# Patient Record
Sex: FEMALE | Race: BLACK OR AFRICAN AMERICAN | NOT HISPANIC OR LATINO | Employment: OTHER
[De-identification: names, ages, dates, MRNs, and addresses within clinical notes are randomized per-mention and may not be internally consistent; named-entity substitution may affect disease eponyms.]

---

## 2018-01-07 ENCOUNTER — HEALTH MAINTENANCE LETTER (OUTPATIENT)
Age: 53
End: 2018-01-07

## 2018-10-19 ENCOUNTER — TRANSFERRED RECORDS (OUTPATIENT)
Dept: HEALTH INFORMATION MANAGEMENT | Facility: HOSPITAL | Age: 53
End: 2018-10-19

## 2019-04-02 ENCOUNTER — TRANSFERRED RECORDS (OUTPATIENT)
Dept: HEALTH INFORMATION MANAGEMENT | Facility: HOSPITAL | Age: 54
End: 2019-04-02

## 2019-08-01 ENCOUNTER — TRANSFERRED RECORDS (OUTPATIENT)
Dept: HEALTH INFORMATION MANAGEMENT | Facility: HOSPITAL | Age: 54
End: 2019-08-01

## 2019-08-01 LAB
CHOLESTEROL (EXTERNAL): 170 MG/DL (ref 100–199)
HDLC SERPL-MCNC: 44 MG/DL
LDL CHOLESTEROL CALCULATED (EXTERNAL): 106 MG/DL
NON HDL CHOLESTEROL (EXTERNAL): 126 MG/DL
TRIGLYCERIDES (EXTERNAL): 98 MG/DL

## 2020-08-27 NOTE — PROGRESS NOTES
Subjective     Rimma Sarmiento is a 55 year old female who presents to clinic today for the following health issues:    HPI       New Patient/Transfer of Care: Followed with Aida OLIVARES with last visit 1/22/2020    Diabetes Follow-up    How often are you checking your blood sugar? Three times daily  Blood sugar testing frequency justification:  Uncontrolled diabetes  What time of day are you checking your blood sugars (select all that apply)?  Before meals  Have you had any blood sugars above 200?  No  Have you had any blood sugars below 70?  No    What symptoms do you notice when your blood sugar is low?  Lethargy and irratated    What concerns do you have today about your diabetes? None     Do you have any of these symptoms? (Select all that apply)  No numbness or tingling in feet.  No redness, sores or blisters on feet.  No complaints of excessive thirst.  No reports of blurry vision.  No significant changes to weight.    Have you had a diabetic eye exam in the last 12 months? Yes-  Location: [facility]    - last A1c (1/22/2020, [place]): 7.3  - BG: morning 121, lunch 161, dinner 99  - lantus 32u   - Novolog 6/4/3  - on ASA, statin (atorvastatin 80mg), ARB (losartan 100mg)  - attempting to lose weight     Hyperlipidemia Follow-Up      Are you regularly taking any medication or supplement to lower your cholesterol?   Yes- atorvastatin    Are you having muscle aches or other side effects that you think could be caused by your cholesterol lowering medication?  No  - LDL (8/1/2019, [place]): 106 w/ HDL 44  - atorvastatin 80mg    Hypertension / NSVT Follow-up      Do you check your blood pressure regularly outside of the clinic? No     Are you following a low salt diet? Yes    Are your blood pressures ever more than 140 on the top number (systolic) OR more   than 90 on the bottom number (diastolic), for example 140/90? No  - metoprolol succinate 12.5mg  - losartan 100mg  - did not take her medications  this morning, generally takes her medication regularly     - recommend getting an ICD, but Rimma refused therefore she is being treated with metoprolol    BP Readings from Last 2 Encounters:   20 138/84     No results found for: A1C, LDL    Depression and Anxiety Follow-Up    How are you doing with your depression since your last visit? Worsened since moving here -     How are you doing with your anxiety since your last visit?  Worsened since moving    Are you having other symptoms that might be associated with depression or anxiety? No    Have you had a significant life event? No     Do you have any concerns with your use of alcohol or other drugs? No    - lexapro 10mg   - hydroxyzine   - Shannan is making her depressed   - moved to [place] to be close to her son  - has tried counseling in the past w/o good results    Social History     Tobacco Use     Smoking status: Former Smoker     Packs/day: 0.25     Types: Cigarettes     Last attempt to quit: 2019     Years since quittin.0     Smokeless tobacco: Never Used   Substance Use Topics     Alcohol use: Not Currently     Drug use: None     PHQ 2020   PHQ-9 Total Score 18   Q9: Thoughts of better off dead/self-harm past 2 weeks Not at all     PAMELA-7 SCORE 2020   Total Score 21     Last PHQ-9 2020   1.  Little interest or pleasure in doing things 3   2.  Feeling down, depressed, or hopeless 3   3.  Trouble falling or staying asleep, or sleeping too much 3   4.  Feeling tired or having little energy 3   5.  Poor appetite or overeating 3   6.  Feeling bad about yourself 1   7.  Trouble concentrating 2   8.  Moving slowly or restless 0   Q9: Thoughts of better off dead/self-harm past 2 weeks 0   PHQ-9 Total Score 18   Difficulty at work, home, or with people Somewhat difficult     PAMELA-7  2020   1. Feeling nervous, anxious, or on edge 3   2. Not being able to stop or control worrying 3   3. Worrying too much about different  "things 3   4. Trouble relaxing 3   5. Being so restless that it is hard to sit still 3   6. Becoming easily annoyed or irritable 3   7. Feeling afraid, as if something awful might happen 3   PAMELA-7 Total Score 21   If you checked any problems, how difficult have they made it for you to do your work, take care of things at home, or get along with other people? Extremely difficult        # GERD  - changed to zantac which does not work as well and would like her omeprazole back   - omeprazole interacts with her plavix    # h/o CVA (9/5/2019 stroke like symptoms and true stroke ~ 2014 ) b/l embolism of posterior cerebral arteries with residual hemianopsia   - plavix / ASA    # concern for STDs: exposure July   - vaginal discharge, no odor, itching, or sores     Review of Systems   Constitutional: Negative for chills and fever.   HENT: Negative for congestion, ear pain, rhinorrhea and sore throat.    Eyes: Negative for pain.   Respiratory: Negative for cough and shortness of breath.    Cardiovascular: Negative for chest pain and palpitations.   Gastrointestinal: Negative for abdominal pain, constipation, diarrhea, hematochezia, nausea and vomiting.   Endocrine: Negative for cold intolerance, heat intolerance and polyuria.   Genitourinary: Positive for flank pain, frequency, urgency and vaginal discharge. Negative for dysuria, hematuria and pelvic pain.        No odor   Musculoskeletal: Negative for arthralgias, joint swelling and myalgias.   Skin: Positive for rash (acne).        alopecia    Neurological: Negative for dizziness, light-headedness and headaches.   Hematological: Does not bruise/bleed easily.   Psychiatric/Behavioral: Negative for mood changes. The patient is not nervous/anxious.           Objective    /84 (BP Location: Left arm, Patient Position: Chair, Cuff Size: Adult Large)   Pulse 66   Temp 98.6  F (37  C) (Tympanic)   Resp 18   Ht 1.549 m (5' 1\")   Wt 105.5 kg (232 lb 9.6 oz)   SpO2 98%   " BMI 43.95 kg/m    Body mass index is 43.95 kg/m .  Physical Exam  Constitutional:       General: She is not in acute distress.     Appearance: She is well-developed. She is obese.   HENT:      Head: Normocephalic and atraumatic.      Right Ear: Hearing and tympanic membrane normal.      Left Ear: Hearing and tympanic membrane normal.      Mouth/Throat:      Mouth: Mucous membranes are moist.      Pharynx: No oropharyngeal exudate.   Eyes:      Extraocular Movements: Extraocular movements intact.      Pupils: Pupils are equal, round, and reactive to light.   Neck:      Musculoskeletal: Normal range of motion and neck supple.      Thyroid: No thyromegaly.   Cardiovascular:      Rate and Rhythm: Normal rate and regular rhythm.      Pulses: Normal pulses.      Heart sounds: Normal heart sounds. No murmur.   Pulmonary:      Effort: Pulmonary effort is normal. No respiratory distress.      Breath sounds: Normal breath sounds. No wheezing or rales.   Abdominal:      General: Bowel sounds are normal. There is no distension.      Palpations: Abdomen is soft.      Tenderness: There is abdominal tenderness in the epigastric area. There is no right CVA tenderness, left CVA tenderness or guarding.   Musculoskeletal: Normal range of motion.   Lymphadenopathy:      Cervical: No cervical adenopathy.   Skin:     General: Skin is dry.   Neurological:      Mental Status: She is alert.      Deep Tendon Reflexes: Reflexes normal.   Psychiatric:         Mood and Affect: Mood normal.        Results for orders placed or performed in visit on 09/09/20 (from the past 24 hour(s))   Hemoglobin A1c   Result Value Ref Range    Hemoglobin A1C 6.3 (H) 0 - 5.6 %   Lipid Profile   Result Value Ref Range    Cholesterol 144 <200 mg/dL    Triglycerides 123 <150 mg/dL    HDL Cholesterol 46 (L) >49 mg/dL    LDL Cholesterol Calculated 73 <100 mg/dL    Non HDL Cholesterol 98 <130 mg/dL   CBC with platelets   Result Value Ref Range    WBC 10.1 4.0 - 11.0  10e9/L    RBC Count 5.57 (H) 3.8 - 5.2 10e12/L    Hemoglobin 14.1 11.7 - 15.7 g/dL    Hematocrit 44.2 35.0 - 47.0 %    MCV 79 78 - 100 fl    MCH 25.3 (L) 26.5 - 33.0 pg    MCHC 31.9 31.5 - 36.5 g/dL    RDW 13.0 10.0 - 15.0 %    Platelet Count 386 150 - 450 10e9/L   Comprehensive metabolic panel (BMP + Alb, Alk Phos, ALT, AST, Total. Bili, TP)   Result Value Ref Range    Sodium 139 133 - 144 mmol/L    Potassium 4.5 3.4 - 5.3 mmol/L    Chloride 111 (H) 94 - 109 mmol/L    Carbon Dioxide 24 20 - 32 mmol/L    Anion Gap 4 3 - 14 mmol/L    Glucose 130 (H) 70 - 99 mg/dL    Urea Nitrogen 11 7 - 30 mg/dL    Creatinine 0.87 0.52 - 1.04 mg/dL    GFR Estimate 74 >60 mL/min/[1.73_m2]    GFR Estimate If Black 86 >60 mL/min/[1.73_m2]    Calcium 9.0 8.5 - 10.1 mg/dL    Bilirubin Total 0.3 0.2 - 1.3 mg/dL    Albumin 3.3 (L) 3.4 - 5.0 g/dL    Protein Total 7.5 6.8 - 8.8 g/dL    Alkaline Phosphatase 96 40 - 150 U/L    ALT 32 0 - 50 U/L    AST 18 0 - 45 U/L   Estimated Average Glucose   Result Value Ref Range    Estimated Average Glucose 134 mg/dL   UA reflex to Microscopic and Culture - [place]    Specimen: Midstream Urine   Result Value Ref Range    Color Urine Yellow     Appearance Urine Clear     Glucose Urine Negative NEG^Negative mg/dL    Bilirubin Urine Negative NEG^Negative    Ketones Urine Negative NEG^Negative mg/dL    Specific Gravity Urine 1.031 1.003 - 1.035    Blood Urine Trace (A) NEG^Negative    pH Urine 6.0 4.7 - 8.0 pH    Protein Albumin Urine 300 (A) NEG^Negative mg/dL    Urobilinogen mg/dL Normal 0.0 - 2.0 mg/dL    Nitrite Urine Negative NEG^Negative    Leukocyte Esterase Urine Negative NEG^Negative    Source Midstream Urine     RBC Urine 2 0 - 2 /HPF    WBC Urine 13 (H) 0 - 5 /HPF    Bacteria Urine None (A) NEG^Negative /HPF    Squamous Epithelial /HPF Urine 4 (H) 0 - 1 /HPF    Mucous Urine Present (A) NEG^Negative /LPF           Assessment & Plan     Type 2 diabetes mellitus without complication, with long-term  current use of insulin (H)  A1c 6.3, previous A1c was 7.3  - Hemoglobin A1c  - Lipid Profile  - c/w lantus 32u   - c/w Novolog 6/4/3  - on ASA, statin (atorvastatin 80mg), ARB (losartan 100mg)    Essential hypertension / Ventricular tachycardia, non-sustained (H) / HLD  BP just at goal, but Rimma did not take her BP medications this morning.   LDL (today): 73  - CBC with platelets  - Comprehensive metabolic panel (BMP + Alb, Alk Phos, ALT, AST, Total. Bili, TP)  - c/w metoprolol succinate 12.5mg for NSVT  - c/w losartan 100mg  - c/w atorvastatin 80mg   - CARDIOLOGY EVAL ADULT REFERRAL    Anxiety / Recurrent major depressive episodes (H)  Mood is worsening d/t moving to [place]. She feels the town is too small.   - hydrOXYzine (ATARAX) 25 MG tablet; Take 1 tablet (25 mg) by mouth nightly as needed for anxiety  - MENTAL HEALTH REFERRAL  - Adult; Outpatient Treatment; Behavioral Health Home; Range: [facility] [phone]; We will contact you to schedule the appointment or please call with any questions  - Estimated Average Glucose  - c/w lexapro 10mg     Alopecia  - fluocinonide (LIDEX) 0.05 % external solution; Apply topically as needed  - DERMATOLOGY ADULT REFERRAL; Future    Cerebrovascular accident (CVA) due to bilateral embolism of posterior cerebral arteries (H)  - c/w ASA 81mg / plavix     Gastroesophageal reflux disease, esophagitis presence not specified  Omeprazole was stopped d/t interaction with plavix. Zantac is not effective. Protonix does not interact with plavix  - pantoprazole (PROTONIX) 40 MG EC tablet; Take 1 tablet (40 mg) by mouth daily    Possible exposure to STD  - GC/Chlamydia by PCR - [facility]  - HIV Antigen Antibody Combo  - Treponema Abs w Reflex to RPR and Titer  - Wet prep  - UA reflex to Microscopic and Culture - [place]    Abnormal urine findings  - Urine Culture Aerobic Bacterial. No bacteria, LE, nitrates. Trace blood, but RBC of 2. WBC of 13   - UCx pending    Special screening  "for malignant neoplasms, colon  - GENERAL SURG ADULT REFERRAL    # Wellness:  - Pap: NA d/t s/p JOSE w/ BSO for heavy periods  - Mammogram (6/3/2015, [facility]): birads 1, benign   - STDs: lab orders placed   - Contraception: NA d/t JOSE  - Immunizations: hepatitis B (3 dose), shingrex, influenza  - Lipids: LDL (8/1/2019, [facility]): 106 w/ HDL 44  - Dexa scan: NA d/t age  - Colon cancer screening (6/29/2016): adenomatous polyps and hyperplastic polyps. Recommend repeat in 2 yrs d/t poor prep. Rimma is agreeable to general sx referral   - Lung cancer screening: deferred to next visit  - AAA screening: NA d/t age       BMI:   Estimated body mass index is 43.95 kg/m  as calculated from the following:    Height as of this encounter: 1.549 m (5' 1\").    Weight as of this encounter: 105.5 kg (232 lb 9.6 oz).   Weight management plan: Discussed healthy diet and exercise guidelines    See Patient Instructions    Return in about 3 months (around 12/9/2020) for Diabetic Visit, Lab Work.    Melida Turner MD  [facility] - [place]    "

## 2020-09-08 PROBLEM — E66.01 MORBID OBESITY WITH BMI OF 40.0-44.9, ADULT (H): Status: ACTIVE | Noted: 2019-09-18

## 2020-09-08 PROBLEM — M75.41 IMPINGEMENT SYNDROME OF RIGHT SHOULDER: Status: ACTIVE | Noted: 2019-08-01

## 2020-09-08 PROBLEM — I63.433: Status: ACTIVE | Noted: 2019-09-05

## 2020-09-08 PROBLEM — M75.121 NONTRAUMATIC COMPLETE TEAR OF RIGHT ROTATOR CUFF: Status: ACTIVE | Noted: 2020-09-08

## 2020-09-08 PROBLEM — I47.29 VENTRICULAR TACHYCARDIA, NON-SUSTAINED (H): Status: ACTIVE | Noted: 2019-09-18

## 2020-09-08 PROBLEM — F41.9 ANXIETY: Status: ACTIVE | Noted: 2018-04-26

## 2020-09-08 RX ORDER — HYDROCHLOROTHIAZIDE 25 MG/1
25 TABLET ORAL
COMMUNITY
End: 2021-01-07

## 2020-09-08 RX ORDER — LOSARTAN POTASSIUM 100 MG/1
100 TABLET ORAL
COMMUNITY
Start: 2020-08-12 | End: 2020-10-13

## 2020-09-08 RX ORDER — INSULIN ASPART 100 [IU]/ML
INJECTION, SOLUTION INTRAVENOUS; SUBCUTANEOUS
COMMUNITY
Start: 2020-06-24 | End: 2024-02-20

## 2020-09-08 RX ORDER — IBUPROFEN 200 MG
200 TABLET ORAL
COMMUNITY
Start: 2019-10-17 | End: 2024-06-06

## 2020-09-08 RX ORDER — INSULIN GLARGINE 100 [IU]/ML
INJECTION, SOLUTION SUBCUTANEOUS
COMMUNITY
End: 2021-05-12

## 2020-09-08 RX ORDER — FLUOCINONIDE TOPICAL SOLUTION USP, 0.05% 0.5 MG/ML
SOLUTION TOPICAL
COMMUNITY
Start: 2020-04-13 | End: 2020-09-09

## 2020-09-08 RX ORDER — HYDROXYZINE HYDROCHLORIDE 25 MG/1
25 TABLET, FILM COATED ORAL
COMMUNITY
Start: 2020-08-12 | End: 2020-09-09

## 2020-09-08 RX ORDER — METOPROLOL SUCCINATE 25 MG/1
12.5 TABLET, EXTENDED RELEASE ORAL
COMMUNITY
Start: 2020-04-13 | End: 2020-10-12 | Stop reason: DRUGHIGH

## 2020-09-08 RX ORDER — CLOPIDOGREL BISULFATE 75 MG/1
75 TABLET ORAL
COMMUNITY
Start: 2019-09-06 | End: 2024-02-20

## 2020-09-08 RX ORDER — ESCITALOPRAM OXALATE 10 MG/1
10 TABLET ORAL
COMMUNITY
End: 2021-01-07

## 2020-09-08 RX ORDER — METFORMIN HYDROCHLORIDE EXTENDED-RELEASE TABLETS 500 MG/1
TABLET, FILM COATED, EXTENDED RELEASE ORAL
COMMUNITY
Start: 2019-09-05 | End: 2020-10-12

## 2020-09-08 RX ORDER — INSULIN PUMP SYRINGE, 3 ML
EACH MISCELLANEOUS
COMMUNITY
Start: 2020-01-22 | End: 2024-02-20

## 2020-09-08 RX ORDER — ATORVASTATIN CALCIUM 80 MG/1
80 TABLET, FILM COATED ORAL
COMMUNITY
Start: 2019-09-05 | End: 2021-01-07

## 2020-09-08 RX ORDER — ASPIRIN 81 MG/1
81 TABLET ORAL
COMMUNITY
Start: 2019-10-15 | End: 2021-01-07

## 2020-09-08 RX ORDER — CLINDAMYCIN PHOSPHATE 10 MG/G
GEL TOPICAL
COMMUNITY
End: 2024-02-20

## 2020-09-09 ENCOUNTER — OFFICE VISIT (OUTPATIENT)
Dept: FAMILY MEDICINE | Facility: OTHER | Age: 55
End: 2020-09-09
Attending: FAMILY MEDICINE
Payer: COMMERCIAL

## 2020-09-09 VITALS
RESPIRATION RATE: 18 BRPM | DIASTOLIC BLOOD PRESSURE: 84 MMHG | TEMPERATURE: 98.6 F | OXYGEN SATURATION: 98 % | WEIGHT: 232.6 LBS | HEART RATE: 66 BPM | HEIGHT: 61 IN | BODY MASS INDEX: 43.92 KG/M2 | SYSTOLIC BLOOD PRESSURE: 138 MMHG

## 2020-09-09 DIAGNOSIS — R82.90 ABNORMAL URINE FINDINGS: ICD-10-CM

## 2020-09-09 DIAGNOSIS — I63.433 CEREBROVASCULAR ACCIDENT (CVA) DUE TO BILATERAL EMBOLISM OF POSTERIOR CEREBRAL ARTERIES (H): ICD-10-CM

## 2020-09-09 DIAGNOSIS — E11.9 TYPE 2 DIABETES MELLITUS WITHOUT COMPLICATION, WITH LONG-TERM CURRENT USE OF INSULIN (H): Primary | ICD-10-CM

## 2020-09-09 DIAGNOSIS — I10 ESSENTIAL HYPERTENSION: ICD-10-CM

## 2020-09-09 DIAGNOSIS — K21.9 GASTROESOPHAGEAL REFLUX DISEASE, ESOPHAGITIS PRESENCE NOT SPECIFIED: ICD-10-CM

## 2020-09-09 DIAGNOSIS — Z79.4 TYPE 2 DIABETES MELLITUS WITHOUT COMPLICATION, WITH LONG-TERM CURRENT USE OF INSULIN (H): Primary | ICD-10-CM

## 2020-09-09 DIAGNOSIS — Z20.2 POSSIBLE EXPOSURE TO STD: ICD-10-CM

## 2020-09-09 DIAGNOSIS — F41.9 ANXIETY: ICD-10-CM

## 2020-09-09 DIAGNOSIS — L65.9 ALOPECIA: ICD-10-CM

## 2020-09-09 DIAGNOSIS — I47.29 VENTRICULAR TACHYCARDIA, NON-SUSTAINED (H): ICD-10-CM

## 2020-09-09 DIAGNOSIS — F33.9 RECURRENT MAJOR DEPRESSIVE EPISODES (H): ICD-10-CM

## 2020-09-09 DIAGNOSIS — E78.5 HYPERLIPIDEMIA, UNSPECIFIED HYPERLIPIDEMIA TYPE: ICD-10-CM

## 2020-09-09 DIAGNOSIS — Z12.11 SPECIAL SCREENING FOR MALIGNANT NEOPLASMS, COLON: ICD-10-CM

## 2020-09-09 LAB
ALBUMIN SERPL-MCNC: 3.3 G/DL (ref 3.4–5)
ALBUMIN UR-MCNC: 300 MG/DL
ALP SERPL-CCNC: 96 U/L (ref 40–150)
ALT SERPL W P-5'-P-CCNC: 32 U/L (ref 0–50)
ANION GAP SERPL CALCULATED.3IONS-SCNC: 4 MMOL/L (ref 3–14)
APPEARANCE UR: CLEAR
AST SERPL W P-5'-P-CCNC: 18 U/L (ref 0–45)
BACTERIA #/AREA URNS HPF: ABNORMAL /HPF
BILIRUB SERPL-MCNC: 0.3 MG/DL (ref 0.2–1.3)
BILIRUB UR QL STRIP: NEGATIVE
BUN SERPL-MCNC: 11 MG/DL (ref 7–30)
C TRACH DNA SPEC QL NAA+PROBE: NOT DETECTED
CALCIUM SERPL-MCNC: 9 MG/DL (ref 8.5–10.1)
CHLORIDE SERPL-SCNC: 111 MMOL/L (ref 94–109)
CHOLEST SERPL-MCNC: 144 MG/DL
CO2 SERPL-SCNC: 24 MMOL/L (ref 20–32)
COLOR UR AUTO: YELLOW
CREAT SERPL-MCNC: 0.87 MG/DL (ref 0.52–1.04)
ERYTHROCYTE [DISTWIDTH] IN BLOOD BY AUTOMATED COUNT: 13 % (ref 10–15)
EST. AVERAGE GLUCOSE BLD GHB EST-MCNC: 134 MG/DL
GFR SERPL CREATININE-BSD FRML MDRD: 74 ML/MIN/{1.73_M2}
GLUCOSE SERPL-MCNC: 130 MG/DL (ref 70–99)
GLUCOSE UR STRIP-MCNC: NEGATIVE MG/DL
HBA1C MFR BLD: 6.3 % (ref 0–5.6)
HCT VFR BLD AUTO: 44.2 % (ref 35–47)
HDLC SERPL-MCNC: 46 MG/DL
HGB BLD-MCNC: 14.1 G/DL (ref 11.7–15.7)
HGB UR QL STRIP: ABNORMAL
KETONES UR STRIP-MCNC: NEGATIVE MG/DL
LDLC SERPL CALC-MCNC: 73 MG/DL
LEUKOCYTE ESTERASE UR QL STRIP: NEGATIVE
MCH RBC QN AUTO: 25.3 PG (ref 26.5–33)
MCHC RBC AUTO-ENTMCNC: 31.9 G/DL (ref 31.5–36.5)
MCV RBC AUTO: 79 FL (ref 78–100)
MUCOUS THREADS #/AREA URNS LPF: PRESENT /LPF
N GONORRHOEA DNA SPEC QL NAA+PROBE: NOT DETECTED
NITRATE UR QL: NEGATIVE
NONHDLC SERPL-MCNC: 98 MG/DL
PH UR STRIP: 6 PH (ref 4.7–8)
PLATELET # BLD AUTO: 386 10E9/L (ref 150–450)
POTASSIUM SERPL-SCNC: 4.5 MMOL/L (ref 3.4–5.3)
PROT SERPL-MCNC: 7.5 G/DL (ref 6.8–8.8)
RBC # BLD AUTO: 5.57 10E12/L (ref 3.8–5.2)
RBC #/AREA URNS AUTO: 2 /HPF (ref 0–2)
SODIUM SERPL-SCNC: 139 MMOL/L (ref 133–144)
SOURCE: ABNORMAL
SP GR UR STRIP: 1.03 (ref 1–1.03)
SPECIMEN SOURCE: NORMAL
SQUAMOUS #/AREA URNS AUTO: 4 /HPF (ref 0–1)
TRIGL SERPL-MCNC: 123 MG/DL
UROBILINOGEN UR STRIP-MCNC: NORMAL MG/DL (ref 0–2)
WBC # BLD AUTO: 10.1 10E9/L (ref 4–11)
WBC #/AREA URNS AUTO: 13 /HPF (ref 0–5)

## 2020-09-09 PROCEDURE — 87491 CHLMYD TRACH DNA AMP PROBE: CPT | Mod: ZL | Performed by: FAMILY MEDICINE

## 2020-09-09 PROCEDURE — 36415 COLL VENOUS BLD VENIPUNCTURE: CPT | Mod: ZL | Performed by: FAMILY MEDICINE

## 2020-09-09 PROCEDURE — 80053 COMPREHEN METABOLIC PANEL: CPT | Mod: ZL | Performed by: FAMILY MEDICINE

## 2020-09-09 PROCEDURE — 80061 LIPID PANEL: CPT | Mod: ZL | Performed by: FAMILY MEDICINE

## 2020-09-09 PROCEDURE — 87086 URINE CULTURE/COLONY COUNT: CPT | Mod: ZL | Performed by: FAMILY MEDICINE

## 2020-09-09 PROCEDURE — 81001 URINALYSIS AUTO W/SCOPE: CPT | Mod: ZL | Performed by: FAMILY MEDICINE

## 2020-09-09 PROCEDURE — 40000788 ZZHCL STATISTIC ESTIMATED AVERAGE GLUCOSE: Mod: ZL | Performed by: FAMILY MEDICINE

## 2020-09-09 PROCEDURE — 83036 HEMOGLOBIN GLYCOSYLATED A1C: CPT | Mod: ZL | Performed by: FAMILY MEDICINE

## 2020-09-09 PROCEDURE — 87591 N.GONORRHOEAE DNA AMP PROB: CPT | Mod: ZL | Performed by: FAMILY MEDICINE

## 2020-09-09 PROCEDURE — 87389 HIV-1 AG W/HIV-1&-2 AB AG IA: CPT | Mod: ZL | Performed by: FAMILY MEDICINE

## 2020-09-09 PROCEDURE — G0463 HOSPITAL OUTPT CLINIC VISIT: HCPCS

## 2020-09-09 PROCEDURE — 99204 OFFICE O/P NEW MOD 45 MIN: CPT | Performed by: FAMILY MEDICINE

## 2020-09-09 PROCEDURE — 86780 TREPONEMA PALLIDUM: CPT | Mod: ZL | Performed by: FAMILY MEDICINE

## 2020-09-09 PROCEDURE — 85027 COMPLETE CBC AUTOMATED: CPT | Mod: ZL | Performed by: FAMILY MEDICINE

## 2020-09-09 RX ORDER — HYDROXYZINE HYDROCHLORIDE 25 MG/1
25 TABLET, FILM COATED ORAL
Qty: 30 TABLET | Refills: 0 | Status: SHIPPED | OUTPATIENT
Start: 2020-09-09 | End: 2020-10-13

## 2020-09-09 RX ORDER — PANTOPRAZOLE SODIUM 40 MG/1
40 TABLET, DELAYED RELEASE ORAL DAILY
Qty: 90 TABLET | Refills: 1 | Status: SHIPPED | OUTPATIENT
Start: 2020-09-09 | End: 2021-02-23

## 2020-09-09 RX ORDER — FLUOCINONIDE TOPICAL SOLUTION USP, 0.05% 0.5 MG/ML
SOLUTION TOPICAL PRN
Qty: 60 ML | Refills: 0 | Status: SHIPPED | OUTPATIENT
Start: 2020-09-09 | End: 2020-10-13

## 2020-09-09 ASSESSMENT — ANXIETY QUESTIONNAIRES
IF YOU CHECKED OFF ANY PROBLEMS ON THIS QUESTIONNAIRE, HOW DIFFICULT HAVE THESE PROBLEMS MADE IT FOR YOU TO DO YOUR WORK, TAKE CARE OF THINGS AT HOME, OR GET ALONG WITH OTHER PEOPLE: EXTREMELY DIFFICULT
2. NOT BEING ABLE TO STOP OR CONTROL WORRYING: NEARLY EVERY DAY
GAD7 TOTAL SCORE: 21
3. WORRYING TOO MUCH ABOUT DIFFERENT THINGS: NEARLY EVERY DAY
7. FEELING AFRAID AS IF SOMETHING AWFUL MIGHT HAPPEN: NEARLY EVERY DAY
6. BECOMING EASILY ANNOYED OR IRRITABLE: NEARLY EVERY DAY
5. BEING SO RESTLESS THAT IT IS HARD TO SIT STILL: NEARLY EVERY DAY
1. FEELING NERVOUS, ANXIOUS, OR ON EDGE: NEARLY EVERY DAY

## 2020-09-09 ASSESSMENT — PATIENT HEALTH QUESTIONNAIRE - PHQ9
5. POOR APPETITE OR OVEREATING: NEARLY EVERY DAY
SUM OF ALL RESPONSES TO PHQ QUESTIONS 1-9: 18

## 2020-09-09 ASSESSMENT — ENCOUNTER SYMPTOMS
ROS SKIN COMMENTS: ALOPECIA
LIGHT-HEADEDNESS: 0
DIARRHEA: 0
DYSURIA: 0
VOMITING: 0
ARTHRALGIAS: 0
FREQUENCY: 1
CONSTIPATION: 0
JOINT SWELLING: 0
BRUISES/BLEEDS EASILY: 0
COUGH: 0
NERVOUS/ANXIOUS: 0
CHILLS: 0
DIZZINESS: 0
NAUSEA: 0
HEMATOCHEZIA: 0
ABDOMINAL PAIN: 0
SHORTNESS OF BREATH: 0
FLANK PAIN: 1
HEADACHES: 0
MYALGIAS: 0
RHINORRHEA: 0
SORE THROAT: 0
HEMATURIA: 0
EYE PAIN: 0
FEVER: 0
PALPITATIONS: 0

## 2020-09-09 ASSESSMENT — MIFFLIN-ST. JEOR: SCORE: 1582.45

## 2020-09-09 NOTE — NURSING NOTE
"Chief Complaint   Patient presents with     Establish Care       Initial /84 (BP Location: Left arm, Patient Position: Chair, Cuff Size: Adult Large)   Pulse 66   Temp 98.6  F (37  C) (Tympanic)   Resp 18   Ht 1.549 m (5' 1\")   Wt 105.5 kg (232 lb 9.6 oz)   SpO2 98%   BMI 43.95 kg/m   Estimated body mass index is 43.95 kg/m  as calculated from the following:    Height as of this encounter: 1.549 m (5' 1\").    Weight as of this encounter: 105.5 kg (232 lb 9.6 oz).  Medication Reconciliation: complete  Lara Marte LPN  "

## 2020-09-09 NOTE — PATIENT INSTRUCTIONS
We put in a referral to cardiology, dermatology, general surgery for colonoscopy,  and mental health

## 2020-09-10 LAB — HIV 1+2 AB+HIV1 P24 AG SERPL QL IA: NONREACTIVE

## 2020-09-10 ASSESSMENT — ANXIETY QUESTIONNAIRES: GAD7 TOTAL SCORE: 21

## 2020-09-11 LAB
BACTERIA SPEC CULT: ABNORMAL
SPECIMEN SOURCE: ABNORMAL
T PALLIDUM AB SER QL: NONREACTIVE

## 2020-09-24 ENCOUNTER — TELEPHONE (OUTPATIENT)
Dept: SURGERY | Facility: OTHER | Age: 55
End: 2020-09-24

## 2020-09-24 DIAGNOSIS — Z86.0100 HISTORY OF COLONIC POLYPS: ICD-10-CM

## 2020-09-24 NOTE — TELEPHONE ENCOUNTER
Referral received for screening colonoscopy. This patient said she did colonoscopy 2016 and 2019 at [facility] and was advised that she does not need to return again. Discussed with patient that continued colonoscopy screening is still recommended through age 74, but interval depends on findings on previous colonoscopy.    Upon review of records in care everywhere, her last colonoscopy was 4/2/2019 with findings of 1 hyperplastic polyp. The surgeon who performed the colonoscopy recommended that the patient return in 5 years for colonoscopy. Health Maintenance has been updated and a recall letter set to come before she will be due again 4/2024.     Referring provider notified.

## 2020-10-02 ENCOUNTER — TELEPHONE (OUTPATIENT)
Dept: BEHAVIORAL HEALTH | Facility: OTHER | Age: 55
End: 2020-10-02

## 2020-10-11 NOTE — PROGRESS NOTES
[facility] HEART CARE   CARDIOLOGY CONSULT     Rimma Sarmiento   1965  [id number]    Melida Turner     Chief Complaint   Patient presents with     New Patient          HPI:   Mrs. Sarmiento is a 55-year-old female who is being seen by cardiology as she has a history of NSVT with EP study with concerns for DVT.  VT was ruled out.  She has had no palpitations or fluttering.  She does have a history of CVA involving the right medial occipital and posterior temporal lobe on 9/3/2019.  There is also history of diastolic dysfunction grade 2, SUSIE not currently on CPAP, GERD, morbid obesity, adamant D deficiency, DM-2, hyperlipidemia, hypertension, anxiety, depression, and ongoing tobacco abuse.    Ms. Sarmiento was admitted to [facility] from 09/18/2019-09/20/2019 for dizziness and blurred vision. MRI was not obtained during admission due to extreme claustrophobia. She was treated for CVA due to bilateral embolism of posterior cerebral arteries and started on ASA and Plavix. Event monitor showed tele NSVT which correlated with dizziness and blurred vision. Coronary CT ruled out subclinical CAD. I performed an EP study on 09/20/2019 and was unable to induce VT despite aggressive stimulation. Ms. Sarmiento refused LINQ placement at that time. She was discharged with metoprolol 12.5 mg twice daily, hydrochlorothiazide was discontinued. Outpatient MRI on 09/27/2019 showed old infarctions in left occipital lobe and left frontal centrum semiovale.    Ms. Sarmiento followed up with Lilliam Lopez NP at [facility] on 09/27/2019 at which time she complained of episodes of heart racing and shortness of breath. Ms. Sarmiento noted she was hesitant to have device placed as her mother passed away shortly after PPM placement in her 60s, however she was willing to consider it and was referred to me to discuss LINQ placement.    She followed up with Dr. Weathers on 10/15/2019 she denied any NSVT episodes since being discharged. She  had not been taking her aspirin due to problems filling prescriptions and was preparing for surgery to repair right rotator cuff tear (successfully completed on 10/16/2019 without complication).    She does have brief episodes of what sounds like simple ectopy every couple days that occur randomly and last only a moment. She hasn't had any more dizziness since starting metoprolol. She has gained weight that she attributes to stress related eating. The patient denies chest pain, dyspnea on exertion, paroxysmal nocturnal dyspnea, orthopnea, edema, palpitations, tachycardia, presyncope or syncope.    She does have history of sleep apnea.  She states which she was assessed but was turned down for a device for unknown reasons while living in [place].  She is convinced she has sleep apnea as she wakes herself up at night from snoring.  She is .      IMAGING RESULTS:   Echocardiogram on 9/3/2019:  Normal LV size and systolic function.     Normal RV size and function.     Severe LA dilatation.     Trace to mild aortic regurgitation.     Negative bubble study.       Left Ventricular Ejection Fraction: 60 %     Stress echo on 7/19/2019:   1 Exercise did not seem to reproduce presenting chest pain symptoms.   2 No echocardiographic evidence of myocardial ischemia at the HR achieved.   3 Stress ECG is negative for myocardial ischemia.    CTA coronaries on 9/26/2019:  1. Normal coronary arteries without evidence of plaque or stenosis. The   total coronary calcium score is 0.  2. Normal coronary anatomy with normal coronary artery origins.  3. Symmetric left ventricular wall thickness.   4. No evidence of left ventricular noncompaction.  5. Normal right ventricular size. No evidence of intramyocardial fat in   the right ventricular walls. Regional wall motion was not assessed.   6. Visualized portions of the thoracic aorta are of normal caliber.  7. Please review the other non-coronary cardiac findings below.  8. Also  see the radiology report for any incidental non-cardiac findings.     Cardiac MRI in 12/26/2019:  1. Normal left ventricular size and systolic function. The quantitative   LVEF is 62%.  2. No significant myocardial fibrosis or scar.  3. Normal RV size and systolic function. The quantitative RVEF is 53%. No   MR evidence of significant regional wall motion abnormalities or fatty   infiltration/fibrosis of the right ventricle is appreciated.   4. No significant valvular or pericardial disease.    MRI head/brain on 9/27/2019:  1.  No acute intracranial abnormality.  2.  Normal MRI of the internal auditory canals and labyrinthine structures.  3.  Old infarctions in the left occipital lobe and left frontal centrum  Semiovale.    Study 11/10/2012:  1. Obstructive sleep apnea diagnosed via polysomnography study (AHI 11/hour during non-REM sleep, 26/hour during REM sleep, SPO2 nini 87%.   2. Hypersomnia endorsed (Trimont Sleepiness Scale score 11/24). Medical comorbidities include diabetes mellitus, hypertension, hyperlipidemia, and depression.   3. Nasal CPAP at a pressure of 7 cm H2O reduced AHI and maintained oxygen saturations.       CURRENT MEDICATIONS:   Prior to Admission medications    Medication Sig Start Date End Date Taking? Authorizing Provider   aspirin 81 MG EC tablet Take 81 mg by mouth 10/15/19   Reported, Patient   atorvastatin (LIPITOR) 80 MG tablet Take 80 mg by mouth 9/5/19   Reported, Patient   Blood Glucose Monitoring Suppl (FIFTY50 GLUCOSE METER 2.0) w/Device KIT Dispense meter, test strips, lancets covered by pt ins. E11.9 IDDM type II - Test 3 times/day. 1/22/20   Reported, Patient   clindamycin (CLINDAMAX) 1 % external gel     Reported, Patient   clopidogrel (PLAVIX) 75 MG tablet Take 75 mg by mouth 9/6/19   Reported, Patient   escitalopram (LEXAPRO) 10 MG tablet Take 10 mg by mouth    Reported, Patient   fluocinonide (LIDEX) 0.05 % external solution Apply topically as needed 9/9/20   Melida Turner  MD Julio   hydrochlorothiazide (HYDRODIURIL) 25 MG tablet Take 25 mg by mouth    Reported, Patient   hydrOXYzine (ATARAX) 25 MG tablet Take 1 tablet (25 mg) by mouth nightly as needed for anxiety 9/9/20   Melida Turner MD   ibuprofen (ADVIL/MOTRIN) 200 MG tablet Take 200 mg by mouth 10/17/19   Reported, Patient   insulin aspart (NOVOLOG FLEXPEN) 100 UNIT/ML pen INJECT 6 UNITS UNDER THE SKIN WITH BREAKFAST, 4 UNITS WITH LUNCH AND 3 UNITS WITH DINNER 6/24/20   Reported, Patient   insulin aspart (NOVOLOG VIAL) 100 UNITS/ML vial     Reported, Patient   insulin glargine (LANTUS SOLOSTAR) 100 UNIT/ML pen Inject 32 Units Subcutaneous    Reported, Patient   insulin glargine (LANTUS VIAL) 100 UNIT/ML vial     Reported, Patient   losartan (COZAAR) 100 MG tablet Take 100 mg by mouth 8/12/20   Reported, Patient   metFORMIN ER osmotic (FORTAMET) 500 MG 24 hr tablet Take 2 tablets (1000mg) by mouth every evening with a meal.  Indications: Type 2 Diabetes 9/5/19   Reported, Patient   metoprolol succinate ER (TOPROL-XL) 25 MG 24 hr tablet Take 12.5 mg by mouth 4/13/20   Reported, Patient   nicotine (NICOTROL) 10 MG inhaler     Reported, Patient   pantoprazole (PROTONIX) 40 MG EC tablet Take 1 tablet (40 mg) by mouth daily 9/9/20   Melida Turner MD       ALLERGIES:   Allergies   Allergen Reactions     Penicillins Angioedema, Other (See Comments), Hives and Rash     Codeine Nausea and Vomiting and Other (See Comments)     sweats     Etodolac Nausea and Vomiting     Hydrocodone      Metformin Diarrhea     Severe diarrhea     Trazodone Other (See Comments)     Felt suicidal while on Trazodone     Venlafaxine Nausea and Vomiting     Lisinopril Cough        PAST MEDICAL HISTORY:   Past Medical History:   Diagnosis Date     Anxiety      Depression      DM (diabetes mellitus) (H)      High cholesterol 09/17/2019     Hypertension      Sleep apnea      Stroke (H)      Type 2 diabetes mellitus (H) 10/23/2008        PAST  SURGICAL HISTORY:   Past Surgical History:   Procedure Laterality Date     CHOLECYSTECTOMY       COLONOSCOPY  2016    done at [facility] in [place]-6 polyps, mix of tubular adenoma and hyperplastic     COLONOSCOPY  2019    [facility]-hyperplastic polyp x 1--repeat 5 years     HYSTERECTOMY TOTAL ABDOMINAL, BILATERAL SALPINGO-OOPHORECTOMY, COMBINED          FAMILY HISTORY:   Family History   Problem Relation Age of Onset     Diabetes Mother      Hypertension Mother      Cerebrovascular Disease Mother      Alcoholism Brother      Diabetes Brother      Cerebrovascular Disease Maternal Grandmother      Alcoholism Brother         SOCIAL HISTORY:   Social History     Socioeconomic History     Marital status: Legally      Spouse name: Not on file     Number of children: Not on file     Years of education: Not on file     Highest education level: Not on file   Occupational History     Not on file   Social Needs     Financial resource strain: Not on file     Food insecurity     Worry: Not on file     Inability: Not on file     Transportation needs     Medical: Not on file     Non-medical: Not on file   Tobacco Use     Smoking status: Former Smoker     Packs/day: 0.25     Types: Cigarettes     Quit date: 2019     Years since quittin.1     Smokeless tobacco: Never Used   Substance and Sexual Activity     Alcohol use: Not Currently     Drug use: Not Currently     Sexual activity: Not on file   Lifestyle     Physical activity     Days per week: Not on file     Minutes per session: Not on file     Stress: Not on file   Relationships     Social connections     Talks on phone: Not on file     Gets together: Not on file     Attends [employer]ism service: Not on file     Active member of club or organization: Not on file     Attends meetings of clubs or organizations: Not on file     Relationship status: Not on file     Intimate partner violence     Fear of current or ex partner: Not on file     Emotionally  abused: Not on file     Physically abused: Not on file     Forced sexual activity: Not on file   Other Topics Concern     Not on file   Social History Narrative     Not on file          ROS:   CONSTITUTIONAL: No weight loss, fever, chills, weakness but admits to fatigue.   HEENT: Eyes: No visual changes. Ears, Nose, Throat: No hearing loss, congestion or difficulty swallowing.   CARDIOVASCULAR: No chest pain, chest pressure or chest discomfort.  Rare palpitations with limited peripheral lower extremity edema.   RESPIRATORY: No shortness of breath, dyspnea upon exertion, cough or sputum production.   GASTROINTESTINAL: No abdominal pain. No anorexia, nausea, vomiting or diarrhea.   NEUROLOGICAL: No headache, lightheadedness, dizziness, syncope, ataxia or weakness.   HEMATOLOGIC: No anemia, bleeding or bruising.   PSYCHIATRIC: No history of depression or anxiety.   ENDOCRINOLOGIC: No reports of sweating, cold or heat intolerance. No polyuria or polydipsia.   SKIN: No abnormal rashes or itching.       PHYSICAL EXAM:   GENERAL: The patient is a well-developed, well-nourished, in no apparent distress. Alert and oriented x3.   HEENT: Head is normocephalic and atraumatic. Eyes are symmetrical with normal visual tracking.  HEART: Regular rate and rhythm, S1S2 present without murmur, rub or gallop.   LUNGS: Respirations regular and unlabored. Clear to auscultation.   GI: Abdomen is soft and nondistended.   EXTREMITIES: No peripheral edema present.   MUSCULOSKELETAL: No joint swelling.   NEUROLOGIC: Alert and oriented X3.    SKIN: No jaundice. No rashes or visible skin lesions present.        LAB RESULTS:   Office Visit on 09/09/2020   Component Date Value Ref Range Status     Specimen Source 09/09/2020 Urine   Final     Neisseria gonorrhoreae PCR 09/09/2020 Not Detected  NDET^Not Detected Final     Chlamydia Trachomatis PCR 09/09/2020 Not Detected  NDET^Not Detected Final     HIV Antigen Antibody Combo 09/09/2020 Nonreactive   NR^Nonreactive     Final     Treponema Antibodies 09/09/2020 Nonreactive  NR^Nonreactive Final     Color Urine 09/09/2020 Yellow   Final     Appearance Urine 09/09/2020 Clear   Final     Glucose Urine 09/09/2020 Negative  NEG^Negative mg/dL Final     Bilirubin Urine 09/09/2020 Negative  NEG^Negative Final     Ketones Urine 09/09/2020 Negative  NEG^Negative mg/dL Final     Specific Gravity Urine 09/09/2020 1.031  1.003 - 1.035 Final     Blood Urine 09/09/2020 Trace* NEG^Negative Final     pH Urine 09/09/2020 6.0  4.7 - 8.0 pH Final     Protein Albumin Urine 09/09/2020 300* NEG^Negative mg/dL Final     Urobilinogen mg/dL 09/09/2020 Normal  0.0 - 2.0 mg/dL Final     Nitrite Urine 09/09/2020 Negative  NEG^Negative Final     Leukocyte Esterase Urine 09/09/2020 Negative  NEG^Negative Final     Source 09/09/2020 Midstream Urine   Final     RBC Urine 09/09/2020 2  0 - 2 /HPF Final     WBC Urine 09/09/2020 13* 0 - 5 /HPF Final     Bacteria Urine 09/09/2020 None* NEG^Negative /HPF Final     Squamous Epithelial /HPF Urine 09/09/2020 4* 0 - 1 /HPF Final     Mucous Urine 09/09/2020 Present* NEG^Negative /LPF Final     Hemoglobin A1C 09/09/2020 6.3* 0 - 5.6 % Final     Cholesterol 09/09/2020 144  <200 mg/dL Final     Triglycerides 09/09/2020 123  <150 mg/dL Final     HDL Cholesterol 09/09/2020 46* >49 mg/dL Final     LDL Cholesterol Calculated 09/09/2020 73  <100 mg/dL Final     Non HDL Cholesterol 09/09/2020 98  <130 mg/dL Final     WBC 09/09/2020 10.1  4.0 - 11.0 10e9/L Final     RBC Count 09/09/2020 5.57* 3.8 - 5.2 10e12/L Final     Hemoglobin 09/09/2020 14.1  11.7 - 15.7 g/dL Final     Hematocrit 09/09/2020 44.2  35.0 - 47.0 % Final     MCV 09/09/2020 79  78 - 100 fl Final     MCH 09/09/2020 25.3* 26.5 - 33.0 pg Final     MCHC 09/09/2020 31.9  31.5 - 36.5 g/dL Final     RDW 09/09/2020 13.0  10.0 - 15.0 % Final     Platelet Count 09/09/2020 386  150 - 450 10e9/L Final     Sodium 09/09/2020 139  133 - 144 mmol/L Final      Potassium 09/09/2020 4.5  3.4 - 5.3 mmol/L Final     Chloride 09/09/2020 111* 94 - 109 mmol/L Final     Carbon Dioxide 09/09/2020 24  20 - 32 mmol/L Final     Anion Gap 09/09/2020 4  3 - 14 mmol/L Final     Glucose 09/09/2020 130* 70 - 99 mg/dL Final     Urea Nitrogen 09/09/2020 11  7 - 30 mg/dL Final     Creatinine 09/09/2020 0.87  0.52 - 1.04 mg/dL Final     GFR Estimate 09/09/2020 74  >60 mL/min/[1.73_m2] Final     GFR Estimate If Black 09/09/2020 86  >60 mL/min/[1.73_m2] Final     Calcium 09/09/2020 9.0  8.5 - 10.1 mg/dL Final     Bilirubin Total 09/09/2020 0.3  0.2 - 1.3 mg/dL Final     Albumin 09/09/2020 3.3* 3.4 - 5.0 g/dL Final     Protein Total 09/09/2020 7.5  6.8 - 8.8 g/dL Final     Alkaline Phosphatase 09/09/2020 96  40 - 150 U/L Final     ALT 09/09/2020 32  0 - 50 U/L Final     AST 09/09/2020 18  0 - 45 U/L Final     Specimen Description 09/09/2020 Midstream Urine   Final     Culture Micro 09/09/2020 *  Final                    Value:>100,000 colonies/mL  Mixed bacterial guillaume  No further identification or sensitivity done       Estimated Average Glucose 09/09/2020 134  mg/dL Final          ASSESSMENT:       ICD-10-CM    1. Ventricular tachycardia, non-sustained (H)  I47.2 carvedilol (COREG) 6.25 MG tablet   2. Essential hypertension  I10 carvedilol (COREG) 6.25 MG tablet   3. Hypertensive urgency  I16.0 carvedilol (COREG) 6.25 MG tablet   4. History of electrophysiologic study for VT which was not found on 9/20/2019  Z98.890    5. History of CVA (cerebrovascular accident)  Z86.73    6. Cerebrovascular accident (CVA) due to bilateral embolism of posterior cerebral arteries (H)  I63.433    7. Obstructive sleep apnea syndrome  G47.33 SLEEP EVALUATION & MANAGEMENT REFERRAL - [facility] [phone] (Age 13 and up if over 100 lbs), [facility] [phone] (Age 5 and up)   8. Morbid obesity (H)  E66.01    9. Morbid obesity with BMI of 40.0-44.9, adult (H)   E66.01     Z68.41    10. Mixed hyperlipidemia  E78.2    11. Tobacco use disorder  F17.200    12. Chronic diastolic heart failure (H)  I50.32    13. Tobacco abuse counseling  Z71.6    14. Type 2 diabetes mellitus with hyperglycemia, with long-term current use of insulin (H)  E11.65     Z79.4          PLAN:   1.  With history of NSVT and very rare palpitations with elevated blood pressure, will discontinue metoprolol and start on Coreg 6.25 mg twice a day.  2.  With history of assessment and diagnosis of SUSIE, she will be referred to sleep medicine for evaluation and treatment.  3.  Discontinue metoprolol 25 mg XL daily.  4.  Start on Coreg 6.25 mg twice a day.  5.  Continue on aspirin 81 mg daily, Lipitor 80 mg daily, Plavix 75 mg daily, hydrochlorothiazide 25 mg daily, losartan 100 mg daily, and Protonix 20 mg daily  6.  Will be seen in 1 to 2 months follow-up to reassess blood pressure with changes at that time if appropriate.    Thank you for allowing me to participate in the care of your patient. Please do not hesitate to contact me if you have any questions.     Bucky Pretty, DO

## 2020-10-12 ENCOUNTER — OFFICE VISIT (OUTPATIENT)
Dept: CARDIOLOGY | Facility: OTHER | Age: 55
End: 2020-10-12
Attending: FAMILY MEDICINE
Payer: COMMERCIAL

## 2020-10-12 VITALS
OXYGEN SATURATION: 99 % | SYSTOLIC BLOOD PRESSURE: 162 MMHG | WEIGHT: 235 LBS | HEART RATE: 78 BPM | BODY MASS INDEX: 44.4 KG/M2 | TEMPERATURE: 99.7 F | DIASTOLIC BLOOD PRESSURE: 78 MMHG

## 2020-10-12 DIAGNOSIS — I47.20 VENTRICULAR TACHYCARDIA (H): Primary | ICD-10-CM

## 2020-10-12 DIAGNOSIS — Z98.890 HISTORY OF ELECTROPHYSIOLOGIC STUDY: ICD-10-CM

## 2020-10-12 DIAGNOSIS — I63.433 CEREBROVASCULAR ACCIDENT (CVA) DUE TO BILATERAL EMBOLISM OF POSTERIOR CEREBRAL ARTERIES (H): ICD-10-CM

## 2020-10-12 DIAGNOSIS — E11.65 TYPE 2 DIABETES MELLITUS WITH HYPERGLYCEMIA, WITH LONG-TERM CURRENT USE OF INSULIN (H): ICD-10-CM

## 2020-10-12 DIAGNOSIS — E78.2 MIXED HYPERLIPIDEMIA: ICD-10-CM

## 2020-10-12 DIAGNOSIS — E66.01 MORBID OBESITY (H): ICD-10-CM

## 2020-10-12 DIAGNOSIS — Z79.4 TYPE 2 DIABETES MELLITUS WITH HYPERGLYCEMIA, WITH LONG-TERM CURRENT USE OF INSULIN (H): ICD-10-CM

## 2020-10-12 DIAGNOSIS — I16.0 HYPERTENSIVE URGENCY: ICD-10-CM

## 2020-10-12 DIAGNOSIS — I47.29 VENTRICULAR TACHYCARDIA, NON-SUSTAINED (H): Primary | ICD-10-CM

## 2020-10-12 DIAGNOSIS — I10 ESSENTIAL HYPERTENSION: ICD-10-CM

## 2020-10-12 DIAGNOSIS — Z86.73 HISTORY OF CVA (CEREBROVASCULAR ACCIDENT): ICD-10-CM

## 2020-10-12 DIAGNOSIS — I50.32 CHRONIC DIASTOLIC HEART FAILURE (H): ICD-10-CM

## 2020-10-12 DIAGNOSIS — Z71.6 TOBACCO ABUSE COUNSELING: ICD-10-CM

## 2020-10-12 DIAGNOSIS — F17.200 TOBACCO USE DISORDER: ICD-10-CM

## 2020-10-12 DIAGNOSIS — G47.33 OBSTRUCTIVE SLEEP APNEA SYNDROME: ICD-10-CM

## 2020-10-12 DIAGNOSIS — E66.01 MORBID OBESITY WITH BMI OF 40.0-44.9, ADULT (H): ICD-10-CM

## 2020-10-12 PROCEDURE — 99204 OFFICE O/P NEW MOD 45 MIN: CPT | Performed by: INTERNAL MEDICINE

## 2020-10-12 PROCEDURE — G0463 HOSPITAL OUTPT CLINIC VISIT: HCPCS | Mod: 25

## 2020-10-12 PROCEDURE — 93010 ELECTROCARDIOGRAM REPORT: CPT | Performed by: INTERNAL MEDICINE

## 2020-10-12 PROCEDURE — G0463 HOSPITAL OUTPT CLINIC VISIT: HCPCS

## 2020-10-12 PROCEDURE — 93005 ELECTROCARDIOGRAM TRACING: CPT

## 2020-10-12 RX ORDER — CARVEDILOL 6.25 MG/1
6.25 TABLET ORAL 2 TIMES DAILY WITH MEALS
Qty: 180 TABLET | Refills: 3 | Status: SHIPPED | OUTPATIENT
Start: 2020-10-12 | End: 2020-10-28 | Stop reason: SINTOL

## 2020-10-12 ASSESSMENT — PAIN SCALES - GENERAL: PAINLEVEL: NO PAIN (0)

## 2020-10-12 NOTE — NURSING NOTE
".  Chief Complaint   Patient presents with     New Patient       Initial BP (!) 162/78 (BP Location: Right arm, Patient Position: Sitting, Cuff Size: Adult Large)   Pulse 78   Temp 99.7  F (37.6  C)   Wt 106.6 kg (235 lb)   SpO2 99%   BMI 44.40 kg/m   Estimated body mass index is 44.4 kg/m  as calculated from the following:    Height as of 9/9/20: 1.549 m (5' 1\").    Weight as of this encounter: 106.6 kg (235 lb).  Medication Reconciliation: complete  Ashley Atkinson LPN    "

## 2020-10-12 NOTE — PATIENT INSTRUCTIONS
Thank you for allowing Dr. Pretty and our  team to participate in your care. Please call our office at [phone] with scheduling questions or if you need to cancel or change your appointment. With any other questions or concerns you may call Ashley cardiology nurse at [phone].       If you experience chest pain, chest pressure, chest tightness, shortness of breath, fainting, lightheadedness, nausea, vomiting, or other concerning symptoms, please report to the Emergency Department or call 911. These symptoms may be emergent, and best treated in the Emergency Department.    Follow up in 1-2 months     Stop Metoprolol and Start Coreg  6.25 mg     We have put in a sleep study referral. Please fill out and return the questionnaire as this does speed up the process. They will contact you to set it up after reviewing your questionnaire.

## 2020-10-22 ENCOUNTER — NURSE TRIAGE (OUTPATIENT)
Dept: FAMILY MEDICINE | Facility: OTHER | Age: 55
End: 2020-10-22

## 2020-10-22 ENCOUNTER — TELEPHONE (OUTPATIENT)
Dept: CARDIOLOGY | Facility: OTHER | Age: 55
End: 2020-10-22

## 2020-10-22 DIAGNOSIS — B37.31 YEAST INFECTION OF THE VAGINA: Primary | ICD-10-CM

## 2020-10-22 RX ORDER — FLUCONAZOLE 150 MG/1
150 TABLET ORAL
Qty: 2 TABLET | Refills: 0 | Status: SHIPPED | OUTPATIENT
Start: 2020-10-22 | End: 2020-10-26

## 2020-10-22 NOTE — TELEPHONE ENCOUNTER
"Pt calling and continues to have white cottage cheese like vaginal discharge. She feels wetness. It does itch.She thinks she thinks she has a yeast infection.Feels irritated.Per guideline needs to see PCP within 3 days to r/u yeast infection.    Please advise.    Keerthi Stuart RN      Additional Information    Negative: Followed a genital area injury    Negative: Symptoms could be from sexual assault(AFTER using this guideline to treat symptoms, go to guideline SEXUAL ASSAULT OR RAPE)    Negative: Pain or burning with passing urine (urination) is main symptom    Negative: Foreign body in vagina (e.g., tampon)    Negative: [1] Pregnant > 20 weeks  (5 months or more) AND [2] contractions    Negative: Pregnant    Negative: [1] SEVERE abdominal pain (e.g., excruciating) AND [2] present > 1 hour    Negative: Patient sounds very sick or weak to the triager    Negative: [1] Yellow or green vaginal discharge AND [2] fever    Negative: [1] Genital area looks infected (e.g., draining sore, spreading redness) AND [2] fever    Negative: [1] Constant abdominal pain AND [2] present > 2 hours    Negative: [1] Mild lower abdominal pain comes and goes (cramps) AND [2] lasts > 24 hours    Negative: Genital area looks infected (e.g., draining sore, spreading redness)    Negative: [1] Rash is tiny water blisters AND [2] 3 or more    Negative: [1] Rash (e.g., redness, tiny bumps, sore) of genital area AND [2] present > 24 hours    [1] Symptoms of a \"yeast infection\" (i.e., itchy, white discharge, not bad smelling) AND [2] not improved > 3 days following CARE ADVICE    Negative: Abnormal color vaginal discharge (i.e., yellow, green, gray)    Answer Assessment - Initial Assessment Questions  1. DISCHARGE: \"Describe the discharge.\" (e.g., white, yellow, green, gray, foamy, cottage cheese-like)      gennaro cottage cheese  2. ODOR: \"Is there a bad odor?\"      no  3. ONSET: \"When did the discharge begin?\"      August 4. RASH: \"Is there a rash " "in that area?\" If so, ask: \"Describe it.\" (e.g., redness, blisters, sores, bumps)      Very irritated-and feels wet  5. ABDOMINAL PAIN: \"Are you having any abdominal pain?\" If yes: \"What does it feel like? \" (e.g., crampy, dull, intermittent, constant)      no  6. ABDOMINAL PAIN SEVERITY: If present, ask: \"How bad is it?\"  (e.g., mild, moderate, severe)   - MILD - doesn't interfere with normal activities    - MODERATE - interferes with normal activities or awakens from sleep    - SEVERE - patient doesn't want to move (R/O peritonitis)       no  7. CAUSE: \"What do you think is causing the discharge?\" \"Have you had the same problem before? What happened then?\"     She thinks this is a yeast infection  8. OTHER SYMPTOMS: \"Do you have any other symptoms?\" (e.g., fever, itching, vaginal bleeding, pain with urination, injury to genital area, vaginal foreign body)     When it get wet then it will itch  9. PREGNANCY: \"Is there any chance you are pregnant?\" \"When was your last menstrual period?\"      no    Protocols used: VAGINAL [id number]      "

## 2020-10-22 NOTE — TELEPHONE ENCOUNTER
Will tx probable yeast infection with diflucan q72h for 2 doses.   If symptoms do not resolve or return, needs to be seen in clinic.    Melida Turner MD

## 2020-10-22 NOTE — TELEPHONE ENCOUNTER
Pt calling and has not picked up her new RX for Coreg. She has read the side effects and is not sure that she wants to take this medication.She has been taking her Metoprolol.    Please advise and call patient.      Keerthi Stuart RN

## 2020-10-28 ENCOUNTER — VIRTUAL VISIT (OUTPATIENT)
Dept: SLEEP MEDICINE | Facility: CLINIC | Age: 55
End: 2020-10-28
Attending: INTERNAL MEDICINE
Payer: COMMERCIAL

## 2020-10-28 DIAGNOSIS — G47.33 OBSTRUCTIVE SLEEP APNEA SYNDROME: Primary | ICD-10-CM

## 2020-10-28 DIAGNOSIS — F51.04 PSYCHOPHYSIOLOGICAL INSOMNIA: ICD-10-CM

## 2020-10-28 PROCEDURE — 99203 OFFICE O/P NEW LOW 30 MIN: CPT | Mod: 95 | Performed by: PHYSICIAN ASSISTANT

## 2020-10-28 RX ORDER — ZOLPIDEM TARTRATE 5 MG/1
TABLET ORAL
Qty: 1 TABLET | Refills: 0 | Status: SHIPPED | OUTPATIENT
Start: 2020-10-28 | End: 2024-02-20

## 2020-10-28 RX ORDER — METOPROLOL SUCCINATE 25 MG/1
25 TABLET, EXTENDED RELEASE ORAL
COMMUNITY
Start: 2020-10-27 | End: 2020-12-14 | Stop reason: ALTCHOICE

## 2020-10-28 NOTE — PATIENT INSTRUCTIONS
"MY TREATMENT INFORMATION FOR SLEEP APNEA-  Rimma Sarmiento    DOCTOR : Aki Betancur PA-C  SLEEP CENTER :      MY CONTACT NUMBER:     Am I having a sleep study at a sleep center?  Make sure you have an appointment for the study before you leave!    Am I having a home sleep study?  Watch this video:  /drop off device-   Https://www.Treaterube.com/watch?v=yGGFBdELGhk  Disposable device sent out require phone/computer application-   https://www.Treaterube.com/watch?v=BCce_vbiwxE  Please verify your insurance coverage with your insurance carrier    Frequently asked questions:  1. What is Obstructive Sleep Apnea (SUSIE)? SUSIE is the most common type of sleep apnea. Apnea means, \"without breath.\"  Apnea is most often caused by narrowing or collapse of the upper airway as muscles relax during sleep.   Almost everyone has occasional apneas. Most people with sleep apnea have had brief interruptions at night frequently for many years.  The severity of sleep apnea is related to how frequent and severe the events are.   2. What are the consequences of SUSIE? Symptoms include: feeling sleepy during the day, snoring loudly, gasping or stopping of breathing, trouble sleeping, and occasionally morning headaches or heartburn at night.  Sleepiness can be serious and even increase the risk of falling asleep while driving. Other health consequences may include development of high blood pressure and other cardiovascular disease in persons who are susceptible. Untreated SUSIE  can contribute to heart disease, stroke and diabetes.   3. What are the treatment options? In most situations, sleep apnea is a lifelong disease that must be managed with daily therapy. Medications are not effective for sleep apnea and surgery is generally not considered until other therapies have been tried. Your treatment is your choice . Continuous Positive Airway (CPAP) works right away and is the therapy that is effective in nearly everyone. An oral device to " hold your jaw forward is usually the next most reliable option. Other options include postioning devices (to keep you off your back), weight loss, and surgery including a tongue pacing device. There is more detail about some of these options below.    Important tips for using CPAP and similar devices   Know your equipment:  CPAP is continuous positive airway pressure that prevents obstructive sleep apnea by keeping the throat from collapsing while you are sleeping. In most cases, the device is  smart  and can slowly self-adjusts if your throat collapses and keeps a record every day of how well you are treated-this information is available to you and your care team.  BPAP is bilevel positive airway pressure that keeps your throat open and also assists each breath with a pressure boost to maintain adequate breathing.  Special kinds of BPAP are used in patients who have inadequate breathing from lung or heart disease. In most cases, the device is  smart  and can slowly self-adjusts to assist breathing. Like CPAP, the device keeps a record of how well you are treated.  Your mask is your connection to the device. You get to choose what feels most comfortable and the staff will help to make sure if fits. Here: are some examples of the different masks that are available:       Key points to remember on your journey with sleep apnea:  1. Sleep study.  PAP devices often need to be adjusted during a sleep study to show that they are effective and adjusted right.  2. Good tips to remember: Try wearing just the mask during a quiet time during the day so your body adapts to wearing it. A humidifier is recommended for comfort in most cases to prevent drying of your nose and throat. Allergy medication from your provider may help you if you are having nasal congestion.  3. Getting settled-in. It takes more than one night for most of us to get used to wearing a mask. Try wearing just the mask during a quiet time during the day so  your body adapts to wearing it. A humidifier is recommended for comfort in most cases. Our team will work with you carefully on the first day and will be in contact within 4 days and again at 2 and 4 weeks for advice and remote device adjustments. Your therapy is evaluated by the device each day.   4. Use it every night. The more you are able to sleep naturally for 7-8 hours, the more likely you will have good sleep and to prevent health risks or symptoms from sleep apnea. Even if you use it 4 hours it helps. Occasionally all of us are unable to use a medical therapy, in sleep apnea, it is not dangerous to miss one night.   5. Communicate. Call our skilled team on the number provided on the first day if your visit for problems that make it difficult to wear the device. Over 2 out of 3 patients can learn to wear the device long-term with help from our team. Remember to call our team or your sleep providers if you are unable to wear the device as we may have other solutions for those who cannot adapt to mask CPAP therapy. It is recommended that you sleep your sleep provider within the first 3 months and yearly after that if you are not having problems.   6. Use it for your health. We encourage use of CPAP masks during daytime quiet periods to allow your face and brain to adapt to the sensation of CPAP so that it will be a more natural sensation to awaken to at night or during naps. This can be very useful during the first few weeks or months of adapting to CPAP though it does not help medically to wear CPAP during wakefulness and  should not be used as a strategy just to meet guidelines.  7. Take care of your equipment. Make sure you clean your mask and tubing using directions every day and that your filter and mask are replaced as recommended or if they are not working.     BESIDES CPAP, WHAT OTHER THERAPIES ARE THERE?    Positioning Device  Positioning devices are generally used when sleep apnea is mild and only  occurs on your back.This example shows a pillow that straps around the waist. It may be appropriate for those whose sleep study shows milder sleep apnea that occurs primarily when lying flat on one's back. Preliminary studies have shown benefit but effectiveness at home may need to be verified by a home sleep test. These devices are generally not covered by medical insurance.  Examples of devices that maintain sleeping on the back to prevent snoring and mild sleep apnea.    Belt type body positioner  Http://Digital [employer].Project Airplane/    Electronic reminder  Http://nightshifttherapy.com/  Http://www.[employer].Project Airplane.au/      Oral Appliance  What is oral appliance therapy?  An oral appliance device fits on your teeth at night like a retainer used after having braces. The device is made by a specialized dentist and requires several visits over 1-2 months before a manufactured device is made to fit your teeth and is adjusted to prevent your sleep apnea. Once an oral device is working properly, snoring should be improved. A home sleep test may be recommended at that time if to determine whether the sleep apnea is adequately treated.       Some things to remember:  -Oral devices are often, but not always, covered by your medical insurance. Be sure to check with your insurance provider.   -If you are referred for oral therapy, you will be given a list of specialized dentists to consider or you may choose to visit the Web site of the American Academy of Dental Sleep Medicine  -Oral devices are less likely to work if you have severe sleep apnea or are extremely overweight.     More detailed information  An oral appliance is a small acrylic device that fits over the upper and lower teeth  (similar to a retainer or a mouth guard). This device slightly moves jaw forward, which moves the base of the tongue forward, opens the airway, improves breathing for effective treat snoring and obstructive sleep apnea in perhaps 7 out of 10 people .  The  best working devices are custom-made by a dental device  after a mold is made of the teeth 1, 2, 3.  When is an oral appliance indicated?  Oral appliance therapy is recommended as a first-line treatment for patients with primary snoring, mild sleep apnea, and for patients with moderate sleep apnea who prefer appliance therapy to use of CPAP4, 5. Severity of sleep apnea is determined by sleep testing and is based on the number of respiratory events per hour of sleep.   How successful is oral appliance therapy?  The success rate of oral appliance therapy in patients with mild sleep apnea is 75-80% while in patients with moderate sleep apnea it is 50-70%. The chance of success in patients with severe sleep apnea is 40-50%. The research also shows that oral appliances have a beneficial effect on the cardiovascular health of SUSIE patients at the same magnitude as CPAP therapy7.  Oral appliances should be a second-line treatment in cases of severe sleep apnea, but if not completely successful then a combination therapy utilizing CPAP plus oral appliance therapy may be effective. Oral appliances tend to be effective in a broad range of patients although studies show that the patients who have the highest success are females, younger patients, those with milder disease, and less severe obesity. 3, 6.   Finding a dentist that practices dental sleep medicine  Specific training is available through the American Academy of Dental Sleep Medicine for dentists interested in working in the field of sleep. To find a dentist who is educated in the field of sleep and the use of oral appliances, near you, visit the Web site of the American Academy of Dental Sleep Medicine.    References  1. Rao et al. Objectively measured vs self-reported compliance during oral appliance therapy for sleep-disordered breathing. Chest 2013; 144(5): 2276-6128.  2. Kitty et al. Objective measurement of compliance during oral  appliance therapy for sleep-disordered breathing. Thorax 2013; 68(1): 91-96.  3. Tre, et al. Mandibular advancement devices in 620 men and women with SUSIE and snoring: tolerability and predictors of treatment success. Chest 2004; 125: 4334-1738.  4. Talha et al. Oral appliances for snoring and SUSIE: a review. Sleep 2006; 29: 244-262.  5. Tawanda et al. Oral appliance treatment for SUSIE: an update. J Clin Sleep Med 2014; 10(2): 215-227.  6. Gadiel et al. Predictors of OSAH treatment outcome. J Dent Res 2007; 86: 4057-6152.      Weight Loss:    Weight loss is a long-term strategy that may improve sleep apnea in some patients.    Weight management is a personal decision and the decision should be based on your interest and the potential benefits.  If you are interested in exploring weight loss strategies, the following discussion covers the impact on weight loss on sleep apnea and the approaches that may be successful.    Being overweight does not necessarily mean you will have health consequences.  Those who have BMI over 35 or over 27 with existing medical conditions carries greater risk.   Weight loss decreases severity of sleep apnea in most people with obesity. For those with mild obesity who have developed snoring with weight gain, even 15-30 pound weight loss can improve and occasionally eliminate sleep apnea.  Structured and life-long dietary and health habits are necessary to lose weight and keep healthier weight levels.     Though there may be significant health benefits from weight loss, long-term weight loss is very difficult to achieve- studies show success with dietary management in less than 10% of people. In addition, substantial weight loss may require years of dietary control and may be difficult if patients have severe obesity. In these cases, surgical management may be considered.  Finally, older individuals who have tolerated obesity without health complications may be less likely to  benefit from weight loss strategies.        Your BMI is There is no height or weight on file to calculate BMI.  Weight management is a personal decision.  If you are interested in exploring weight loss strategies, the following discussion covers the approaches that may be successful. Body mass index (BMI) is one way to tell whether you are at a healthy weight, overweight, or obese. It measures your weight in relation to your height.  A BMI of 18.5 to 24.9 is in the healthy range. A person with a BMI of 25 to 29.9 is considered overweight, and someone with a BMI of 30 or greater is considered obese. More than two-thirds of American adults are considered overweight or obese.  Being overweight or obese increases the risk for further weight gain. Excess weight may lead to heart disease and diabetes.  Creating and following plans for healthy eating and physical activity may help you improve your health.  Weight control is part of healthy lifestyle and includes exercise, emotional health, and healthy eating habits. Careful eating habits lifelong are the mainstay of weight control. Though there are significant health benefits from weight loss, long-term weight loss with diet alone may be very difficult to achieve- studies show long-term success with dietary management in less than 10% of people. Attaining a healthy weight may be especially difficult to achieve in those with severe obesity. In some cases, medications, devices and surgical management might be considered.  What can you do?  If you are overweight or obese and are interested in methods for weight loss, you should discuss this with your provider.     Consider reducing daily calorie intake by 500 calories.     Keep a food journal.     Avoiding skipping meals, consider cutting portions instead.    Diet combined with exercise helps maintain muscle while optimizing fat loss. Strength training is particularly important for building and maintaining muscle mass.  Exercise helps reduce stress, increase energy, and improves fitness. Increasing exercise without diet control, however, may not burn enough calories to loose weight.       Start walking three days a week 10-20 minutes at a time    Work towards walking thirty minutes five days a week     Eventually, increase the speed of your walking for 1-2 minutes at time    In addition, we recommend that you review healthy lifestyles and methods for weight loss available through the National Institutes of Health patient information sites:  http://win.niddk.nih.gov/publications/index.htm    And look into health and wellness programs that may be available through your health insurance provider, employer, local community center, or shahbaz club.          Surgery:    Surgery for obstructive sleep apnea is considered generally only when other therapies fail to work. Surgery may be discussed with you if you are having a difficult time tolerating CPAP and or when there is an abnormal structure that requires surgical correction.  Nose and throat surgeries often enlarge the airway to prevent collapse.  Most of these surgeries create pain for 1-2 weeks and up to half of the most common surgeries are not effective throughout life.  You should carefully discuss the benefits and drawbacks to surgery with your sleep provider and surgeon to determine if it is the best solution for you.   More information  Surgery for SUSIE is directed at areas that are responsible for narrowing or complete obstruction of the airway during sleep.  There are a wide range of procedures available to enlarge and/or stabilize the airway to prevent blockage of breathing in the three major areas where it can occur: the palate, tongue, and nasal regions.  Successful surgical treatment depends on the accurate identification of the factors responsible for obstructive sleep apnea in each person.  A personalized approach is required because there is no single treatment that  works well for everyone.  Because of anatomic variation, consultation with an examination by a sleep surgeon is a critical first step in determining what surgical options are best for each patient.  In some cases, examination during sedation may be recommended in order to guide the selection of procedures.  Patients will be counseled about risks and benefits as well as the typical recovery course after surgery. Surgery is typically not a cure for a person s SUSIE.  However, surgery will often significantly improve one s SUSIE severity (termed  success rate ).  Even in the absence of a cure, surgery will decrease the cardiovascular risk associated with OSA7; improve overall quality of life8 (sleepiness, functionality, sleep quality, etc).      Palate Procedures:  Patients with SUSIE often have narrowing of their airway in the region of their tonsils and uvula.  The goals of palate procedures are to widen the airway in this region as well as to help the tissues resist collapse.  Modern palate procedure techniques focus on tissue conservation and soft tissue rearrangement, rather than tissue removal.  Often the uvula is preserved in this procedure. Residual sleep apnea is common in patient after pharyngoplasty with an average reduction in sleep apnea events of 33%2.      Tongue Procedures:  ExamWhile patients are awake, the muscles that surround the throat are active and keep this region open for breathing. These muscles relax during sleep, allowing the tongue and other structures to collapse and block breathing.  There are several different tongue procedures available.  Selection of a tongue base procedure depends on characteristics seen on physical exam.  Generally, procedures are aimed at removing bulky tissues in this area or preventing the back of the tongue from falling back during sleep.  Success rates for tongue surgery range from 50-62%3.    Hypoglossal Nerve Stimulation:  Hypoglossal nerve stimulation has recently  received approval from the [place] Food and Drug Administration for the treatment of obstructive sleep apnea.  This is based on research showing that the system was safe and effective in treating sleep apnea6.  Results showed that the median AHI score decreased 68%, from 29.3 to 9.0. This therapy uses an implant system that senses breathing patterns and delivers mild stimulation to airway muscles, which keeps the airway open during sleep.  The system consists of three fully implanted components: a small generator (similar in size to a pacemaker), a breathing sensor, and a stimulation lead.  Using a small handheld remote, a patient turns the therapy on before bed and off upon awakening.    Candidates for this device must be greater than 22 years of age, have moderate to severe SUSIE (AHI between 20-65), BMI less than 32, have tried CPAP/oral appliance without success, and have appropriate upper airway anatomy (determined by a sleep endoscopy performed by Dr. Mays).    Hypoglossal Nerve Stimulation Pathway:    The sleep surgeon s office will work with the patient through the insurance prior-authorization process (including communications and appeals).    Nasal Procedures:  Nasal obstruction can interfere with nasal breathing during the day and night.  Studies have shown that relief of nasal obstruction can improve the ability of some patients to tolerate positive airway pressure therapy for obstructive sleep apnea1.  Treatment options include medications such as nasal saline, topical corticosteroid and antihistamine sprays, and oral medications such as antihistamines or decongestants. Non-surgical treatments can include external nasal dilators for selected patients. If these are not successful by themselves, surgery can improve the nasal airway either alone or in combination with these other options.      Combination Procedures:  Combination of surgical procedures and other treatments may be recommended,  particularly if patients have more than one area of narrowing or persistent positional disease.  The success rate of combination surgery ranges from 66-80%2,3.    References  1. Rohini HINDS. The Role of the Nose in Snoring and Obstructive Sleep Apnoea: An Update.  Eur Arch Otorhinolaryngol. 2011; 268: 1365-73.  2.  Marianela SM; Jermaine JA; Bhumi JR; Pallanch JF; Miguel Angel MB; Maddy SG; Jose Manuel RODRIGUEZ. Surgical modifications of the upper airway for obstructive sleep apnea in adults: a systematic review and meta-analysis. SLEEP 2010;33(10):0679-8123. Sen PRASAD. Hypopharyngeal surgery in obstructive sleep apnea: an evidence-based medicine review.  Arch Otolaryngol Head Neck Surg. 2006 Feb;132(2):206-13.  3. Deangelo YH1, Wilver Y, Silvsetre TERESA. The efficacy of anatomically based multilevel surgery for obstructive sleep apnea. Otolaryngol Head Neck Surg. 2003 Oct;129(4):327-35.  4. Sen PRASAD, Goldberg A. Hypopharyngeal Surgery in Obstructive Sleep Apnea: An Evidence-Based Medicine Review. Arch Otolaryngol Head Neck Surg. 2006 Feb;132(2):206-13.  5. Piyush HAN et al. Upper-Airway Stimulation for Obstructive Sleep Apnea.  N Engl J Med. 2014 Jan 9;370(2):139-49.  6. Katey Y et al. Increased Incidence of Cardiovascular Disease in Middle-aged Men with Obstructive Sleep Apnea. Am J Respir Crit Care Med; 2002 166: 159-165  7. Gore EM et al. Studying Life Effects and Effectiveness of Palatopharyngoplasty (SLEEP) study: Subjective Outcomes of Isolated Uvulopalatopharyngoplasty. Otolaryngol Head Neck Surg. 2011; 144: 623-631.          Changing Sleep Habits    Sometimes insomnia can be made worse by our own habits or situation.  You should consider making some of the following changes to help improve your sleep:     If there is excessive noise in your house / neighborhood use a fan or similar device to make a quiet background noise that can drown out other noises    If your room is too bright early in the morning, hang a blanket or extra  curtain over the windows to keep the light out or use a sleeping mask to cover your eyes    If your room is too warm during the night, set the temperature in the house down a few degrees for the night    Spend a little time before bedtime trying to relax.  Don t work right up until bedtime.  Take 30-60 minutes to relax and unwind before bedtime with a book or watching TV    Do not drink or eat anything with caffeine in it at least 6 hours before bed.    Avoid alcohol at least three hours before bedtime    Do not smoke right before bedtime or during the night    No strenuous exercise for 2-3 hours before bedtime  Each of us has a built in tendency to find it easier to stay up late at night or get up early in the morning.  Being a  night owl  or  early bird  is a function of our internal body clock.  When you are forced to keep a sleep schedule that goes against your typical habits (because a job or other responsibilities) insomnia can be the result.  Try the following changes to see if you can improve your sleep patterns:    Pick a sleep and wake schedule with about 7-8 hours in bed that is consistent.  That means keep the same bedtime and rise time every day of the week including the weekends, for the next 4-6 weeks, e.g. Go to bed at midnight and get out of bed at 7    Try to get outside for at least 30 minutes but preferably up to 2 hours to get some bright sunlight.  Bright light is the best way to  set  your internal body clock and bright light exposure before bedtime may help delay your tendency to fall asleep too early.    Keep as busy as possible in the evening hours to avoid falling asleep.  Try to  avoid sedentary activities as much as possible in the evening too.      Please keep a sleep log or diary during this time to track your sleep patterns

## 2020-11-06 ENCOUNTER — TELEPHONE (OUTPATIENT)
Dept: SLEEP MEDICINE | Facility: CLINIC | Age: 55
End: 2020-11-06

## 2020-11-06 NOTE — TELEPHONE ENCOUNTER
Marycarmen from [facility] called requesting office visit notes from 10/28/2020. Patient is scheduled for her sleep study on 11/12/2020 that was ordered by Aki Rashid.  I faxed 10/28/2020 office notes and the order to [phone] and scheduled the patient for a follow up with Aki Rashid on 12/2/2020.    Marycarmen Phone#:  [phone]  Fax # : [phone]    Mary TRACY CMA

## 2020-11-19 ENCOUNTER — DOCUMENTATION ONLY (OUTPATIENT)
Dept: SLEEP MEDICINE | Facility: HOSPITAL | Age: 55
End: 2020-11-19

## 2020-11-19 DIAGNOSIS — I10 ESSENTIAL HYPERTENSION: ICD-10-CM

## 2020-11-19 DIAGNOSIS — E66.01 MORBID OBESITY WITH BMI OF 40.0-44.9, ADULT (H): ICD-10-CM

## 2020-11-19 DIAGNOSIS — Z79.4 TYPE 2 DIABETES MELLITUS WITH HYPERGLYCEMIA, WITH LONG-TERM CURRENT USE OF INSULIN (H): ICD-10-CM

## 2020-11-19 DIAGNOSIS — E11.65 TYPE 2 DIABETES MELLITUS WITH HYPERGLYCEMIA, WITH LONG-TERM CURRENT USE OF INSULIN (H): ICD-10-CM

## 2020-11-19 DIAGNOSIS — G47.33 OSA (OBSTRUCTIVE SLEEP APNEA): Primary | ICD-10-CM

## 2020-12-03 ENCOUNTER — TELEPHONE (OUTPATIENT)
Dept: BEHAVIORAL HEALTH | Facility: OTHER | Age: 55
End: 2020-12-03

## 2020-12-03 NOTE — TELEPHONE ENCOUNTER
Received referral for possible [facility] services from Dr. Turner.  Attempted to reach patient x2, but was unsuccessful. Will not attempt again.    Sona Thakkar, [facility]  Social Work Care Coordinator  [facility]  [phone] option 2 or [phone]

## 2020-12-14 ENCOUNTER — OFFICE VISIT (OUTPATIENT)
Dept: CARDIOLOGY | Facility: OTHER | Age: 55
End: 2020-12-14
Attending: INTERNAL MEDICINE
Payer: COMMERCIAL

## 2020-12-14 VITALS
WEIGHT: 235 LBS | HEIGHT: 64 IN | BODY MASS INDEX: 40.12 KG/M2 | HEART RATE: 84 BPM | TEMPERATURE: 98.4 F | OXYGEN SATURATION: 99 % | DIASTOLIC BLOOD PRESSURE: 88 MMHG | SYSTOLIC BLOOD PRESSURE: 143 MMHG | RESPIRATION RATE: 18 BRPM

## 2020-12-14 DIAGNOSIS — G47.33 OBSTRUCTIVE SLEEP APNEA SYNDROME: ICD-10-CM

## 2020-12-14 DIAGNOSIS — E55.9 VITAMIN D DEFICIENCY: ICD-10-CM

## 2020-12-14 DIAGNOSIS — Z79.4 TYPE 2 DIABETES MELLITUS WITH HYPERGLYCEMIA, WITH LONG-TERM CURRENT USE OF INSULIN (H): ICD-10-CM

## 2020-12-14 DIAGNOSIS — Z71.6 TOBACCO ABUSE COUNSELING: ICD-10-CM

## 2020-12-14 DIAGNOSIS — E11.65 TYPE 2 DIABETES MELLITUS WITH HYPERGLYCEMIA, WITH LONG-TERM CURRENT USE OF INSULIN (H): ICD-10-CM

## 2020-12-14 DIAGNOSIS — I50.32 CHRONIC DIASTOLIC HEART FAILURE (H): ICD-10-CM

## 2020-12-14 DIAGNOSIS — E78.2 MIXED HYPERLIPIDEMIA: ICD-10-CM

## 2020-12-14 DIAGNOSIS — K21.9 GASTROESOPHAGEAL REFLUX DISEASE, UNSPECIFIED WHETHER ESOPHAGITIS PRESENT: ICD-10-CM

## 2020-12-14 DIAGNOSIS — I69.398 HOMONYMOUS HEMIANOPSIA DUE TO OLD CEREBRAL INFARCTION: ICD-10-CM

## 2020-12-14 DIAGNOSIS — I16.0 HYPERTENSIVE URGENCY: ICD-10-CM

## 2020-12-14 DIAGNOSIS — F17.200 TOBACCO USE DISORDER: ICD-10-CM

## 2020-12-14 DIAGNOSIS — R53.83 FATIGUE, UNSPECIFIED TYPE: ICD-10-CM

## 2020-12-14 DIAGNOSIS — I10 ESSENTIAL HYPERTENSION: Primary | ICD-10-CM

## 2020-12-14 DIAGNOSIS — E66.01 MORBID OBESITY (H): ICD-10-CM

## 2020-12-14 DIAGNOSIS — Z98.890 HISTORY OF ELECTROPHYSIOLOGIC STUDY: ICD-10-CM

## 2020-12-14 DIAGNOSIS — E66.01 MORBID OBESITY WITH BMI OF 40.0-44.9, ADULT (H): ICD-10-CM

## 2020-12-14 DIAGNOSIS — I47.29 VENTRICULAR TACHYCARDIA, NON-SUSTAINED (H): ICD-10-CM

## 2020-12-14 DIAGNOSIS — H53.469 HOMONYMOUS HEMIANOPSIA DUE TO OLD CEREBRAL INFARCTION: ICD-10-CM

## 2020-12-14 DIAGNOSIS — Z86.73 HISTORY OF CVA (CEREBROVASCULAR ACCIDENT): ICD-10-CM

## 2020-12-14 PROCEDURE — 99214 OFFICE O/P EST MOD 30 MIN: CPT | Performed by: INTERNAL MEDICINE

## 2020-12-14 PROCEDURE — G0463 HOSPITAL OUTPT CLINIC VISIT: HCPCS

## 2020-12-14 RX ORDER — CARVEDILOL 25 MG/1
25 TABLET ORAL 2 TIMES DAILY WITH MEALS
Qty: 180 TABLET | Refills: 3 | Status: SHIPPED | OUTPATIENT
Start: 2020-12-14 | End: 2024-02-20

## 2020-12-14 ASSESSMENT — MIFFLIN-ST. JEOR: SCORE: 1633.01

## 2020-12-14 ASSESSMENT — PAIN SCALES - GENERAL: PAINLEVEL: NO PAIN (0)

## 2020-12-14 NOTE — PROGRESS NOTES
[facility] HEART CARE   CARDIOLOGY PROGRESS NOTE     No chief complaint on file.         Diagnosis:  1.  VT-nonsustained with EP study 9/20/2019.  2.  Hypertension-uncontrolled.   3.  Hypertensive urgency.  4.  H/O EP study for VT which was not found on 9/20/2019   5.  H/O CVA involving the right medial occipital and posterior temporal lobe on 9/3/2019 due to bilateral embolism of posterior cerebral arteries.  6.  SUSIE diagnosed on 5/6/2017.  7.  Morbid obesity.   8.  Hyperlipidemia-controlled.   9.  Tobacco abuse.   10.  DM-2.  11.  Chronic grade 2 diastolic dysfunction on 9/3/2019.  12.  Severe LA dilation on 9/3/2019:      Assessment/Plan:    1.    Secondary to uncontrolled hypertension, we will increase Coreg from 6.25 mg twice a day to the 12.5 mg twice a day.  2.  Patient has been trying to get her test strips refilled.  She states she only has a couple left.  Prescription was sent to her pharmacy for test strips.  3.  Discontinue metoprolol 25 mg XL daily.  She did not do that at the last visit.  This was stressed today that she discontinued Toprol as she is on Coreg.  4.  Continue on aspirin 81 mg daily, Lipitor 80 mg daily, Plavix 75 mg daily, hydrochlorothiazide 25 mg daily, losartan 100 mg daily, and Protonix 20 mg daily.  5.  Will be seen in 1 to 2 months follow-up to reassess blood pressure with changes at that time if appropriate.      Interval history:  Rimma is being seen in follow-up.  She has a history uncontrolled hypertension.  She does not have a blood pressure cuff to check her blood pressure at home.  Her blood pressure today is 143/88.  Previously, she did have a CVA secondary to elevated pressures.  She was taken off of metoprolol at her last visit but did not stop it.  She was instructed to discontinue it today.  She is happy about this as she feels metoprolol is made her tired.  She was on metoprolol 25 mg XL daily.  We will plan to increase Coreg as outlined above.  Also, she states she is  almost out of test trips for her diabetes.  These were refilled today.  She denies palpitations or fluttering to her chest with history of nonsustained VT with EP study.  She has SUSIE but she does not have a CPAP.  She does have chronic diastolic dysfunction grade 2 without significant peripheral edema.  We will plan to see her in 1 month follow-up to reassess blood pressures.      HPI:    Mrs. Sarmiento is being seen by cardiology as she has a history of NSVT with EP study with concerns for DVT.  VT was ruled out.  She has had no palpitations or fluttering.  She does have a history of CVA involving the right medial occipital and posterior temporal lobe on 9/3/2019.       Ms. Sarmiento was admitted to [facility] from 09/18/2019-09/20/2019 for dizziness and blurred vision. MRI was not obtained during admission due to extreme claustrophobia. She was treated for CVA due to bilateral embolism of posterior cerebral arteries and started on ASA and Plavix. Event monitor showed tele NSVT which correlated with dizziness and blurred vision. Coronary CT ruled out subclinical CAD. I performed an EP study on 09/20/2019 and was unable to induce VT despite aggressive stimulation. Ms. Sarmiento refused LINQ placement at that time. She was discharged with metoprolol 12.5 mg twice daily, hydrochlorothiazide was discontinued. Outpatient MRI on 09/27/2019 showed old infarctions in left occipital lobe and left frontal centrum semiovale.    Ms. Sarmiento followed up with Lilliam Lopez NP at [facility] on 09/27/2019 at which time she complained of episodes of heart racing and shortness of breath. Ms. Sarmiento noted she was hesitant to have device placed as her mother passed away shortly after PPM placement in her 60's, however she was willing to consider it and was referred to me to discuss LINQ placement.    She followed up with Dr. Weathers on 10/15/2019 she denied any NSVT episodes since being discharged. She had not been taking her aspirin  due to problems filling prescriptions and was preparing for surgery to repair right rotator cuff tear (successfully completed on 10/16/2019 without complication).    She does have brief episodes of what sounds like simple ectopy every couple days that occur randomly and last only a moment. She hasn't had any more dizziness since starting metoprolol. She has gained weight that she attributes to stress related eating. The patient denies chest pain, dyspnea on exertion, paroxysmal nocturnal dyspnea, orthopnea, edema, palpitations, tachycardia, presyncope or syncope.     She does have history of sleep apnea.  She states which she was assessed but was turned down for a device for unknown reasons while living in [place].  She is convinced she has sleep apnea as she wakes herself up at night from snoring.  She is .      Relevant testing:  Echocardiogram on 9/3/2019:  Normal LV size and systolic function.     Normal RV size and function.     Severe LA dilatation.     Trace to mild aortic regurgitation.     Negative bubble study.       Left Ventricular Ejection Fraction: 60 %     CTA coronaries on 9/19/2019:  1. Normal coronary arteries without evidence of plaque or stenosis. The   total coronary calcium score is 0.  2. Normal coronary anatomy with normal coronary artery origins.  3. Symmetric left ventricular wall thickness.   4. No evidence of left ventricular non compaction.  5. Normal right ventricular size. No evidence of intramyocardial fat in   the right ventricular walls. Regional wall motion was not assessed.   6. Visualized portions of the thoracic aorta are of normal caliber.  7. Please review the other non-coronary cardiac findings below.  8. Also see the radiology report for any incidental non-cardiac findings.      Stress echo on 7/19/2019:   1 Exercise did not seem to reproduce presenting chest pain symptoms.   2 No echocardiographic evidence of myocardial ischemia at the HR achieved.   3 Stress ECG  is negative for myocardial ischemia.     CTA coronaries on 9/26/2019:  1. Normal coronary arteries without evidence of plaque or stenosis. The   total coronary calcium score is 0.  2. Normal coronary anatomy with normal coronary artery origins.  3. Symmetric left ventricular wall thickness.   4. No evidence of left ventricular non compaction.  5. Normal right ventricular size. No evidence of intramyocardial fat in   the right ventricular walls. Regional wall motion was not assessed.   6. Visualized portions of the thoracic aorta are of normal caliber.  7. Please review the other non-coronary cardiac findings below.  8. Also see the radiology report for any incidental non-cardiac findings.        Cardiac MRI in 12/26/2019:  1. Normal left ventricular size and systolic function. The quantitative   LVEF is 62%.  2. No significant myocardial fibrosis or scar.  3. Normal RV size and systolic function. The quantitative RVEF is 53%. No   MR evidence of significant regional wall motion abnormalities or fatty   infiltration/fibrosis of the right ventricle is appreciated.   4. No significant valvular or pericardial disease.     MRI head/brain on 9/27/2019:  1.  No acute intracranial abnormality.  2.  Normal MRI of the internal auditory canals and labyrinthine structures.  3.  Old infarctions in the left occipital lobe and left frontal centrum  Semiovale.     Study 11/10/2012:  1. Obstructive sleep apnea diagnosed via polysomnography study (AHI 11/hour during non-REM sleep, 26/hour during REM sleep, SPO2 nini 87%.   2. Hypersomnia endorsed ([place] Sleepiness Scale score 11/24). Medical comorbidities include diabetes mellitus, hypertension, hyperlipidemia, and depression.   3. Nasal CPAP at a pressure of 7 cm H2O reduced AHI and maintained oxygen saturations.       Past Medical History:   Diagnosis Date     Anxiety      Depression      DM (diabetes mellitus) (H)      High cholesterol 09/17/2019     Hypertension      Sleep  apnea      Stroke (H)      Type 2 diabetes mellitus (H) 10/23/2008       Past Surgical History:   Procedure Laterality Date     CHOLECYSTECTOMY       COLONOSCOPY  06/29/2016    done at [facility] in [place]-6 polyps, mix of tubular adenoma and hyperplastic     COLONOSCOPY  04/02/2019    [facility]-hyperplastic polyp x 1--repeat 5 years     HYSTERECTOMY TOTAL ABDOMINAL, BILATERAL SALPINGO-OOPHORECTOMY, COMBINED         Allergies   Allergen Reactions     Penicillins Angioedema, Other (See Comments), Hives and Rash     Codeine Nausea and Vomiting and Other (See Comments)     sweats     Etodolac Nausea and Vomiting     Hydrocodone      Metformin Diarrhea     Severe diarrhea     Trazodone Other (See Comments)     Felt suicidal while on Trazodone     Venlafaxine Nausea and Vomiting     Lisinopril Cough       Current Outpatient Medications   Medication Sig Dispense Refill     aspirin 81 MG EC tablet Take 81 mg by mouth       atorvastatin (LIPITOR) 80 MG tablet Take 80 mg by mouth       blood glucose (NO BRAND SPECIFIED) test strip Use to test blood sugar 3 times daily or as directed. To accompany: Blood Glucose Monitor Brands: per insurance. 300 strip 3     Blood Glucose Monitoring Suppl (FIFTY50 GLUCOSE METER 2.0) w/Device KIT Dispense meter, test strips, lancets covered by pt ins. E11.9 IDDM type II - Test 3 times/day.       carvedilol (COREG) 25 MG tablet Take 1 tablet (25 mg) by mouth 2 times daily (with meals) 180 tablet 3     clindamycin (CLINDAMAX) 1 % external gel        clopidogrel (PLAVIX) 75 MG tablet Take 75 mg by mouth       escitalopram (LEXAPRO) 10 MG tablet Take 10 mg by mouth       fluocinonide (LIDEX) 0.05 % external solution APPLY TOPICALLY AS NEEDED 60 mL 0     hydrochlorothiazide (HYDRODIURIL) 25 MG tablet Take 25 mg by mouth       hydrOXYzine (ATARAX) 25 MG tablet TAKE 1 TABLET(25 MG) BY MOUTH EVERY NIGHT AS NEEDED FOR ANXIETY 30 tablet 3     ibuprofen (ADVIL/MOTRIN) 200 MG tablet Take 200 mg by  mouth       insulin aspart (NOVOLOG FLEXPEN) 100 UNIT/ML pen INJECT 6 UNITS UNDER THE SKIN WITH BREAKFAST, 4 UNITS WITH LUNCH AND 3 UNITS WITH DINNER       insulin aspart (NOVOLOG VIAL) 100 UNITS/ML vial        insulin glargine (LANTUS SOLOSTAR) 100 UNIT/ML pen Inject 32 Units Subcutaneous       insulin glargine (LANTUS VIAL) 100 UNIT/ML vial        losartan (COZAAR) 100 MG tablet TAKE 1 TABLET BY MOUTH DAILY 30 tablet 3     nicotine (NICOTROL) 10 MG inhaler        pantoprazole (PROTONIX) 40 MG EC tablet Take 1 tablet (40 mg) by mouth daily 90 tablet 1     zolpidem (AMBIEN) 5 MG tablet Take tablet by mouth 15 minutes prior to sleep, for Sleep Study 1 tablet 0       Social History     Socioeconomic History     Marital status: Legally      Spouse name: Not on file     Number of children: Not on file     Years of education: Not on file     Highest education level: Not on file   Occupational History     Not on file   Social Needs     Financial resource strain: Not on file     Food insecurity     Worry: Not on file     Inability: Not on file     Transportation needs     Medical: Not on file     Non-medical: Not on file   Tobacco Use     Smoking status: Current Every Day Smoker     Packs/day: 0.25     Types: Cigarettes     Last attempt to quit: 2019     Years since quittin.3     Smokeless tobacco: Never Used   Substance and Sexual Activity     Alcohol use: Not Currently     Drug use: Not Currently     Sexual activity: Not on file   Lifestyle     Physical activity     Days per week: Not on file     Minutes per session: Not on file     Stress: Not on file   Relationships     Social connections     Talks on phone: Not on file     Gets together: Not on file     Attends Buddhism service: Not on file     Active member of club or organization: Not on file     Attends meetings of clubs or organizations: Not on file     Relationship status: Not on file     Intimate partner violence     Fear of current or ex  partner: Not on file     Emotionally abused: Not on file     Physically abused: Not on file     Forced sexual activity: Not on file   Other Topics Concern     Not on file   Social History Narrative     Not on file       LAB RESULTS:   No visits with results within 2 Month(s) from this visit.   Latest known visit with results is:   Office Visit on 09/09/2020   Component Date Value Ref Range Status     Specimen Source 09/09/2020 Urine   Final     Neisseria gonorrhoreae PCR 09/09/2020 Not Detected  NDET^Not Detected Final     Chlamydia Trachomatis PCR 09/09/2020 Not Detected  NDET^Not Detected Final     HIV Antigen Antibody Combo 09/09/2020 Nonreactive  NR^Nonreactive     Final     Treponema Antibodies 09/09/2020 Nonreactive  NR^Nonreactive Final     Color Urine 09/09/2020 Yellow   Final     Appearance Urine 09/09/2020 Clear   Final     Glucose Urine 09/09/2020 Negative  NEG^Negative mg/dL Final     Bilirubin Urine 09/09/2020 Negative  NEG^Negative Final     Ketones Urine 09/09/2020 Negative  NEG^Negative mg/dL Final     Specific Gravity Urine 09/09/2020 1.031  1.003 - 1.035 Final     Blood Urine 09/09/2020 Trace* NEG^Negative Final     pH Urine 09/09/2020 6.0  4.7 - 8.0 pH Final     Protein Albumin Urine 09/09/2020 300* NEG^Negative mg/dL Final     Urobilinogen mg/dL 09/09/2020 Normal  0.0 - 2.0 mg/dL Final     Nitrite Urine 09/09/2020 Negative  NEG^Negative Final     Leukocyte Esterase Urine 09/09/2020 Negative  NEG^Negative Final     Source 09/09/2020 Midstream Urine   Final     RBC Urine 09/09/2020 2  0 - 2 /HPF Final     WBC Urine 09/09/2020 13* 0 - 5 /HPF Final     Bacteria Urine 09/09/2020 None* NEG^Negative /HPF Final     Squamous Epithelial /HPF Urine 09/09/2020 4* 0 - 1 /HPF Final     Mucous Urine 09/09/2020 Present* NEG^Negative /LPF Final     Hemoglobin A1C 09/09/2020 6.3* 0 - 5.6 % Final     Cholesterol 09/09/2020 144  <200 mg/dL Final     Triglycerides 09/09/2020 123  <150 mg/dL Final     HDL Cholesterol  09/09/2020 46* >49 mg/dL Final     LDL Cholesterol Calculated 09/09/2020 73  <100 mg/dL Final     Non HDL Cholesterol 09/09/2020 98  <130 mg/dL Final     WBC 09/09/2020 10.1  4.0 - 11.0 10e9/L Final     RBC Count 09/09/2020 5.57* 3.8 - 5.2 10e12/L Final     Hemoglobin 09/09/2020 14.1  11.7 - 15.7 g/dL Final     Hematocrit 09/09/2020 44.2  35.0 - 47.0 % Final     MCV 09/09/2020 79  78 - 100 fl Final     MCH 09/09/2020 25.3* 26.5 - 33.0 pg Final     MCHC 09/09/2020 31.9  31.5 - 36.5 g/dL Final     RDW 09/09/2020 13.0  10.0 - 15.0 % Final     Platelet Count 09/09/2020 386  150 - 450 10e9/L Final     Sodium 09/09/2020 139  133 - 144 mmol/L Final     Potassium 09/09/2020 4.5  3.4 - 5.3 mmol/L Final     Chloride 09/09/2020 111* 94 - 109 mmol/L Final     Carbon Dioxide 09/09/2020 24  20 - 32 mmol/L Final     Anion Gap 09/09/2020 4  3 - 14 mmol/L Final     Glucose 09/09/2020 130* 70 - 99 mg/dL Final     Urea Nitrogen 09/09/2020 11  7 - 30 mg/dL Final     Creatinine 09/09/2020 0.87  0.52 - 1.04 mg/dL Final     GFR Estimate 09/09/2020 74  >60 mL/min/[1.73_m2] Final     GFR Estimate If Black 09/09/2020 86  >60 mL/min/[1.73_m2] Final     Calcium 09/09/2020 9.0  8.5 - 10.1 mg/dL Final     Bilirubin Total 09/09/2020 0.3  0.2 - 1.3 mg/dL Final     Albumin 09/09/2020 3.3* 3.4 - 5.0 g/dL Final     Protein Total 09/09/2020 7.5  6.8 - 8.8 g/dL Final     Alkaline Phosphatase 09/09/2020 96  40 - 150 U/L Final     ALT 09/09/2020 32  0 - 50 U/L Final     AST 09/09/2020 18  0 - 45 U/L Final     Specimen Description 09/09/2020 Midstream Urine   Final     Culture Micro 09/09/2020 *  Final                    Value:>100,000 colonies/mL  Mixed bacterial guillaume  No further identification or sensitivity done       Estimated Average Glucose 09/09/2020 134  mg/dL Final        Review of systems: Negative except that which was noted in the HPI.    Physical examination:  BP (!) 143/88 (BP Location: Right arm, Cuff Size: Adult Large)   Pulse 84   Temp  "98.4  F (36.9  C) (Tympanic)   Resp 18   Ht 1.613 m (5' 3.5\")   Wt 106.6 kg (235 lb)   SpO2 99%   BMI 40.98 kg/m      GENERAL APPEARANCE: healthy, alert and no distress  HEENT: no icterus, no xanthelasmas, normal pupil size and reaction, no cyanosis.  NECK: no adenopathy, no asymmetry, masses.  CHEST: lungs clear to auscultation - no rales, rhonchi or wheezes, no use of accessory muscles, no retractions, respirations are unlabored, normal respiratory rate  CARDIOVASCULAR: regular rhythm, normal S1 with physiologic split S2, no S3 or S4 and no murmur, click or rub  ABDOMEN: soft, non tender, bowel sounds normal  EXTREMITIES: no clubbing, cyanosis or edema  NEURO: alert and oriented normal speech, and affect  VASC: No vascular bruits heard.  SKIN: no ecchymoses, no rashes        Thank you for allowing me to participate in the care of your patient. Please do not hesitate to contact me if you have any questions.     Bucky Pretty, DO          "

## 2020-12-14 NOTE — NURSING NOTE
"No chief complaint on file.      Initial BP (!) 143/88 (BP Location: Right arm, Cuff Size: Adult Large)   Pulse 84   Temp 98.4  F (36.9  C) (Tympanic)   Resp 18   Ht 1.613 m (5' 3.5\")   Wt 106.6 kg (235 lb)   SpO2 99%   BMI 40.98 kg/m   Estimated body mass index is 40.98 kg/m  as calculated from the following:    Height as of this encounter: 1.613 m (5' 3.5\").    Weight as of this encounter: 106.6 kg (235 lb).  Medication Reconciliation: complete  Natividad Dinero LPN    "

## 2020-12-14 NOTE — PATIENT INSTRUCTIONS
Thank you for allowing Dr. Pretty and our  team to participate in your care. Please call our office at [phone] with scheduling questions or if you need to cancel or change your appointment. With any other questions or concerns you may call [facility] cardiology nurse at [phone].       If you experience chest pain, chest pressure, chest tightness, shortness of breath, fainting, lightheadedness, nausea, vomiting, or other concerning symptoms, please report to the Emergency Department or call 911. These symptoms may be emergent, and best treated in the Emergency Department.    Follow up in     We have put in a sleep study referral. They will contact you to set this up.     Refilled Coreg 6.25 to 25 mg twice daily.     Refilled test strips     Stop metoprolol

## 2020-12-17 NOTE — PROGRESS NOTES
ILIANA LIM       Name: Rimma Sarmiento MRN# [id number]   Age: 55 year old YOB: 1965     Stop Bang questionnaire completed with a score of >3 to allow for HST     Have you been told you snore loudly (louder than talking or loud enough to be heard through doors)? YES    Do you often feel tired, fatigued, or sleepy during the daytime? NO    Has anyone observed you stop breathing during your sleep? YES    Do you have or are you being treated for high blood pressure? YES    Is your BMI greater than 35? YES    Is your neck size circumference 16 inches or greater? NO    Are you over 50 years old? YES    Stop Bang Score (# of yes): 5

## 2020-12-21 ENCOUNTER — TELEPHONE (OUTPATIENT)
Dept: CARDIOLOGY | Facility: OTHER | Age: 55
End: 2020-12-21

## 2020-12-21 NOTE — TELEPHONE ENCOUNTER
Patient called today and stated she just picked up the Coreg you started her on at 25 mg twice daily and she states due to she reading on line that his medication can make you dizzy and she does not have time to be dizzy , she only took 1/2 of the 25 and she will take the other 1/2 later, she wants to know if this is okay? I did ask patient if she has been checking B/P and she stated no but she stated I am looking into getting one. Patent also stated she just does not understand, why her b/p is high, because before she moved here her B/P was fine. Please advise.

## 2020-12-29 NOTE — PROGRESS NOTES
"Chart review prior to sleep testing.    Patient Summary:  55 year old yo female who is referred for concern for sleep disordered breathing.    Past Medical History:   Diagnosis Date     Anxiety      Depression      DM (diabetes mellitus) (H)      High cholesterol 09/17/2019     Hypertension      Sleep apnea      Stroke (H)      Type 2 diabetes mellitus (H) 10/23/2008       Current Outpatient Medications   Medication     aspirin 81 MG EC tablet     atorvastatin (LIPITOR) 80 MG tablet     blood glucose (NO BRAND SPECIFIED) test strip     Blood Glucose Monitoring Suppl (FIFTY50 GLUCOSE METER 2.0) w/Device KIT     carvedilol (COREG) 25 MG tablet     clindamycin (CLINDAMAX) 1 % external gel     clopidogrel (PLAVIX) 75 MG tablet     escitalopram (LEXAPRO) 10 MG tablet     fluocinonide (LIDEX) 0.05 % external solution     hydrochlorothiazide (HYDRODIURIL) 25 MG tablet     hydrOXYzine (ATARAX) 25 MG tablet     ibuprofen (ADVIL/MOTRIN) 200 MG tablet     insulin aspart (NOVOLOG FLEXPEN) 100 UNIT/ML pen     insulin aspart (NOVOLOG VIAL) 100 UNITS/ML vial     insulin glargine (LANTUS SOLOSTAR) 100 UNIT/ML pen     insulin glargine (LANTUS VIAL) 100 UNIT/ML vial     losartan (COZAAR) 100 MG tablet     nicotine (NICOTROL) 10 MG inhaler     pantoprazole (PROTONIX) 40 MG EC tablet     zolpidem (AMBIEN) 5 MG tablet     No current facility-administered medications for this visit.        STOP-BANG score of 5, with unknown neck circumference.  Vanderbilt score of 0.  BMI of Estimated body mass index is 40.98 kg/m  as calculated from the following:    Height as of 12/14/20: 1.613 m (5' 3.5\").    Weight as of 12/14/20: 106.6 kg (235 lb).     Per questionnaire: \"Stop breathing sometimes, snoring, dry mouth, tired all the time\"    Sxs for 10 years.  Yes for prior sleep testing in [place].  I did not have results for review at this time.    Caffeine use:  Yes for 3+ per day.  No for within 6 hours of bed.    Tobacco use: Yes    Sleep " "pattern:  10pm - 9am, total sleep time ? hours.  Time to fall asleep: ~\"hours\" minutes.  Awakenings: 6 times per night, \"hours\" to return to sleep.  Napping.  0 days per week, - hours per nap.    Yes for RLS screen.  Yes for sleep walking, \"standing up or walking\" a \"few times\".  No for dream enactment behavior.  Yes for bruxism.    Yes for morning headaches.  Yes for snoring.  Yes for observed apnea.  No for FHx of SUSIE.    SHx:  Lives alone, not currently working.    A/P:  1.)  High likelihood of SUSIE with STOP-BANG score of 5, with unclear severity on prior sleep testing.  2.)  Unclear insomnia, potentially both sleep onset maintenance  3.)  Positive for RLS screen  4.)  Uncontrolled HTN   - Given significant insomnia and significant cardiac co-morbidities, recommend in-lab PSG and would offer zolpidem to take for night of PSG.  Entered order for PSG.    ---  This note was written with the assistance of the Dragon voice-dictation technology software. The final document, although reviewed, may contain errors. For corrections, please contact the office.    Garfield Suero MD    Sleep Medicine  [facility]  ([phone])  [facility]  ([phone])  HPI      ROS      Physical Exam      "

## 2021-02-23 DIAGNOSIS — K21.9 GASTROESOPHAGEAL REFLUX DISEASE, UNSPECIFIED WHETHER ESOPHAGITIS PRESENT: Primary | ICD-10-CM

## 2021-02-23 RX ORDER — PANTOPRAZOLE SODIUM 40 MG/1
40 TABLET, DELAYED RELEASE ORAL DAILY
Qty: 90 TABLET | Refills: 1 | Status: SHIPPED | OUTPATIENT
Start: 2021-02-23 | End: 2024-03-26

## 2021-02-23 NOTE — TELEPHONE ENCOUNTER
Pantoprazole 40 mg DR      Last Written Prescription Date:  9-9-2020  Last Fill Quantity: 90,   # refills: 1  Last Office Visit: 9-9-2020

## 2021-02-24 ENCOUNTER — THERAPY VISIT (OUTPATIENT)
Dept: SLEEP MEDICINE | Facility: HOSPITAL | Age: 56
End: 2021-02-24
Attending: FAMILY MEDICINE
Payer: COMMERCIAL

## 2021-02-24 DIAGNOSIS — E11.65 TYPE 2 DIABETES MELLITUS WITH HYPERGLYCEMIA, WITH LONG-TERM CURRENT USE OF INSULIN (H): ICD-10-CM

## 2021-02-24 DIAGNOSIS — I10 ESSENTIAL HYPERTENSION: ICD-10-CM

## 2021-02-24 DIAGNOSIS — G47.33 OSA (OBSTRUCTIVE SLEEP APNEA): ICD-10-CM

## 2021-02-24 DIAGNOSIS — E66.01 MORBID OBESITY WITH BMI OF 40.0-44.9, ADULT (H): ICD-10-CM

## 2021-02-24 DIAGNOSIS — Z79.4 TYPE 2 DIABETES MELLITUS WITH HYPERGLYCEMIA, WITH LONG-TERM CURRENT USE OF INSULIN (H): ICD-10-CM

## 2021-02-24 PROCEDURE — 95810 POLYSOM 6/> YRS 4/> PARAM: CPT | Mod: 26 | Performed by: FAMILY MEDICINE

## 2021-02-24 PROCEDURE — 95810 POLYSOM 6/> YRS 4/> PARAM: CPT

## 2021-02-25 NOTE — PROGRESS NOTES
STUDY TYPE/INSURANCE:  PSG/UCARE  SLEEP AID TYPE/TIME:  AMBIEN 5MG  PAP ACCLIMATION:  NA  RESPIRATORY EVENTS (BASELINE):<30  SNORING:LIGHT TO HEAVY  TCO2 MONITORING AND ABGS:  NO  TITRATION:NA  LEAK RATE:  NA  HOB ELEVATION:FLAT WITH TWO PILLOWS  FINAL MASK TYPE/SIZE:  NA  SLEEP STAGES: 1,2,3,REM   ECG:  NORMAL SINUS  MOVEMENTS/BEHAVIORS:  NORMAL PATTERN  CURRENTLY ON TX OR HAVE EQUIPMENT:  NA

## 2021-03-02 ENCOUNTER — VIRTUAL VISIT (OUTPATIENT)
Dept: SLEEP MEDICINE | Facility: HOSPITAL | Age: 56
End: 2021-03-02
Attending: PHYSICIAN ASSISTANT
Payer: COMMERCIAL

## 2021-03-02 ENCOUNTER — DOCUMENTATION ONLY (OUTPATIENT)
Dept: SLEEP MEDICINE | Facility: CLINIC | Age: 56
End: 2021-03-02

## 2021-03-02 DIAGNOSIS — E11.65 TYPE 2 DIABETES MELLITUS WITH HYPERGLYCEMIA, WITH LONG-TERM CURRENT USE OF INSULIN (H): ICD-10-CM

## 2021-03-02 DIAGNOSIS — G47.33 OSA (OBSTRUCTIVE SLEEP APNEA): Primary | ICD-10-CM

## 2021-03-02 DIAGNOSIS — E66.01 MORBID OBESITY WITH BMI OF 40.0-44.9, ADULT (H): ICD-10-CM

## 2021-03-02 DIAGNOSIS — Z79.4 TYPE 2 DIABETES MELLITUS WITH HYPERGLYCEMIA, WITH LONG-TERM CURRENT USE OF INSULIN (H): ICD-10-CM

## 2021-03-02 DIAGNOSIS — I10 ESSENTIAL HYPERTENSION: ICD-10-CM

## 2021-03-02 LAB — SLPCOMP: NORMAL

## 2021-03-02 PROCEDURE — 99214 OFFICE O/P EST MOD 30 MIN: CPT | Mod: 95 | Performed by: FAMILY MEDICINE

## 2021-03-02 NOTE — PROCEDURES
SLEEP STUDY INTERPRETATION  DIAGNOSTIC POLYSOMNOGRAPHY REPORT      Patient: CHARLINE DE LEON  YOB: 1965  Study Date: 2/24/2021  MRN: [id number]  Referring Provider: Melida Turner  Ordering Provider: Garfield Suero    Indications for Polysomnography: The patient is a 55 year old Female. Her BMI is 41, [place] sleepiness scale 0 and neck circumference is - cm. Relevant medical history includes HTN, DM II, CVA, depression, anxiety. A diagnostic polysomnogram was performed to evaluate for sleep apnea/PLMS/RLS/RBD/S-S/CSA/hypoventilation/hypoxemia.    Polysomnogram Data: A full night polysomnogram recorded the standard physiologic parameters including EEG, EOG, EMG, ECG, nasal and oral airflow. Respiratory parameters of chest and abdominal movements were recorded with respiratory inductance plethysmography. Oxygen saturation was recorded by pulse oximetry. Hypopnea scoring rule used: 1B 4%.    Sleep Architecture: Quite fragmented, no supine REM observed  The total recording time of the polysomnogram was 520.4 minutes. The total sleep time was 368.5 minutes. Sleep latency was normal at 19.9 minutes without the use of a sleep aid. REM latency was 144.0 minutes. Arousal index was increased at 20.4 arousals per hour. Sleep efficiency was decreased at 70.8%. Wake after sleep onset was 127.5 minutes. The patient spent 11.7% of total sleep time in Stage N1, 66.1% in Stage N2, 9.1% in Stage N3, and 13.2% in REM. Time in REM supine was - minutes.    Respiration: Moderate SUSIE (AHI 16.6) without sleep-associated hypoxemia and strong positional component (supine AHI 49.7, lateral AHI 11.4).  Potential that severity under-reported given no supine REM observed.    Events ? The polysomnogram revealed a presence of 100 obstructive, - central, and - mixed apneas resulting in an apnea index of 16.3 events per hour. There were 2 obstructive hypopneas and - central hypopneas resulting in an obstructive hypopnea index of  0.3 and central hypopnea index of - events per hour. The combined apnea/hypopnea index was 16.6 events per hour (central apnea/hypopnea index was - events per hour). The REM AHI was 37.1 events per hour. The supine AHI was 49.7 events per hour. The RERA index was - events per hour.  The RDI was 16.6 events per hour.    Snoring - was reported as moderate.    Respiratory rate and pattern - was notable for normal respiratory rate and pattern.    Sustained Sleep Associated Hypoventilation - Transcutaneous carbon dioxide monitoring was not used, however significant hypoventilation was not suggested by oximetry.    Sleep Associated Hypoxemia - (Greater than 5 minutes O2 sat at or below 88%) was not present. Baseline oxygen saturation was 94.1%. Lowest oxygen saturation was 85.3%. Time spent less than or equal to 88% was 0.9 minutes. Time spent less than or equal to 89% was 1.3 minutes.    Movement Activity: Nothing of note    Periodic Limb Activity - There were - PLMs during the entire study. The PLM index was - movements per hour. The PLM Arousal Index was - per hour.    REM EMG Activity - Excessive transient/sustained muscle activity was not present.    Nocturnal Behavior - Abnormal sleep related behaviors were not noted during/arising out of NREM / REM sleep.     Bruxism - None apparent.    Cardiac Summary: Appears NSR.  The average pulse rate was 56.4 bpm. The minimum pulse rate was 46.9 bpm while the maximum pulse rate was 81.1 bpm.            Assessment:     Moderate SUSIE (AHI 16.6) without sleep-associated hypoxemia and strong positional component (supine AHI 49.7, lateral AHI 11.4).     Potential that severity under-reported given no supine REM observed.    Recommendations:    Consider repeat polysomnography with full night titration of positive airway pressure therapy for the control of sleep disordered breathing.    Based on the presence of moderate obstructive sleep apnea and excessive daytime sleepiness,  treatment could be empirically initiated with Auto?titrating PAP therapy with a range of 5 to 15 cmH2O. Recommend clinical follow up with sleep management team.    Patient may be a candidate for dental appliance through referral to Sleep Dentistry for the treatment of obstructive sleep apnea and/or socially disruptive snoring.    Advice regarding the risks of drowsy driving.    Suggest optimizing sleep schedule and avoiding sleep deprivation.    Weight management (if BMI > 30).    Diagnostic Codes:   Obstructive Sleep Apnea G47.33     _____________________________________   Electronically Signed By: Garfield Suero MD (2/25/2021)

## 2021-03-02 NOTE — PROGRESS NOTES
"Rimma Sarmiento is a 55 year old female who is being evaluated via a billable video visit.       The patient has been notified of following:      \"This video visit will be conducted via a call between you and your physician/provider. We have found that certain health care needs can be provided without the need for an in-person physical exam.  This service lets us provide the care you need with a video conversation.  If a prescription is necessary we can send it directly to your pharmacy.  If lab work is needed we can place an order for that and you can then stop by our lab to have the test done at a later time.     Video visits are billed at different rates depending on your insurance coverage.  Please reach out to your insurance provider with any questions.     If during the course of the call the physician/provider feels a video visit is not appropriate, you will not be charged for this service.\"     Patient has given verbal consent for Video visit? Yes  How would you like to obtain your AVS? Mail a copy  If you are dropped from the video visit, the video invite should be resent to: Text to cell phone: [phone]  Will anyone else be joining your video visit? No  If patient encounters technical issues they should call [phone]      Video-Visit Details     Type of service:  Video Visit     Video Start Time: 1:30pm  Video End Time: 1:47 PM    Originating Location (pt. Location): Home     Distant Location (provider location):  [facility]      Platform used for Video Visit: [employer]    Virtual visit for review of sleep testing results.     Assessment:  -Moderate obstructive sleep apnea without sleep associated hypoxemia  -Significant cardiac comorbidities including uncontrolled hypertension, obesity, prior nonsustained V. tach    Plan:  -Previously, she felt that she slept better with improved quality of life with using CPAP.  Given moderate obstructive sleep apnea with " "uncontrolled hypertension, I feel that it is medically necessary to proceed with treatment for obstructive sleep apnea.  -We will place orders for CPAP auto titrate 5-15 cm H2O.  -Plan for follow-up in 1 month.    SUBJECTIVE:  Rimma Sarmiento is a 55 year old year old female.    STOP-BANG score of 5, with unknown neck circumference.  Dickens score of 0.  BMI of Estimated body mass index is 40.98 kg/m  as calculated from the following:    Height as of 12/14/20: 1.613 m (5' 3.5\").    Weight as of 12/14/20: 106.6 kg (235 lb).      Per questionnaire: \"Stop breathing sometimes, snoring, dry mouth, tired all the time\"     Sxs for 10 years.  Yes for prior sleep testing in [place].  I did not have results for review at this time.     Caffeine use:  Yes for 3+ per day.  No for within 6 hours of bed.     Tobacco use: Yes     Sleep pattern:  10pm - 9am, total sleep time ? hours.  Time to fall asleep: ~\"hours\" minutes.  Awakenings: 6 times per night, \"hours\" to return to sleep.  Napping.  0 days per week, - hours per nap.     Yes for RLS screen.  Yes for sleep walking, \"standing up or walking\" a \"few times\".  No for dream enactment behavior.  Yes for bruxism.     Yes for morning headaches.  Yes for snoring.  Yes for observed apnea.  No for FHx of SUSIE.     SHx:  Lives alone, not currently working.          SLEEP STUDY INTERPRETATION  DIAGNOSTIC POLYSOMNOGRAPHY REPORT        Patient: RIMMA SARMIENTO  YOB: 1965  Study Date: 2/24/2021  MRN: [id number]  Referring Provider: Melida Turner  Ordering Provider: Garfield Suero     Indications for Polysomnography: The patient is a 55 year old Female. Her BMI is 41, [place] sleepiness scale 0 and neck circumference is - cm. Relevant medical history includes HTN, DM II, CVA, depression, anxiety. A diagnostic polysomnogram was performed to evaluate for sleep apnea/PLMS/RLS/RBD/S-S/CSA/hypoventilation/hypoxemia.     Polysomnogram Data: A full night polysomnogram recorded the " standard physiologic parameters including EEG, EOG, EMG, ECG, nasal and oral airflow. Respiratory parameters of chest and abdominal movements were recorded with respiratory inductance plethysmography. Oxygen saturation was recorded by pulse oximetry. Hypopnea scoring rule used: 1B 4%.     Sleep Architecture: Quite fragmented, no supine REM observed  The total recording time of the polysomnogram was 520.4 minutes. The total sleep time was 368.5 minutes. Sleep latency was normal at 19.9 minutes without the use of a sleep aid. REM latency was 144.0 minutes. Arousal index was increased at 20.4 arousals per hour. Sleep efficiency was decreased at 70.8%. Wake after sleep onset was 127.5 minutes. The patient spent 11.7% of total sleep time in Stage N1, 66.1% in Stage N2, 9.1% in Stage N3, and 13.2% in REM. Time in REM supine was - minutes.     Respiration: Moderate SUSIE (AHI 16.6) without sleep-associated hypoxemia and strong positional component (supine AHI 49.7, lateral AHI 11.4).  Potential that severity under-reported given no supine REM observed.    Events ? The polysomnogram revealed a presence of 100 obstructive, - central, and - mixed apneas resulting in an apnea index of 16.3 events per hour. There were 2 obstructive hypopneas and - central hypopneas resulting in an obstructive hypopnea index of 0.3 and central hypopnea index of - events per hour. The combined apnea/hypopnea index was 16.6 events per hour (central apnea/hypopnea index was - events per hour). The REM AHI was 37.1 events per hour. The supine AHI was 49.7 events per hour. The RERA index was - events per hour.  The RDI was 16.6 events per hour.    Snoring - was reported as moderate.    Respiratory rate and pattern - was notable for normal respiratory rate and pattern.    Sustained Sleep Associated Hypoventilation - Transcutaneous carbon dioxide monitoring was not used, however significant hypoventilation was not suggested by oximetry.    Sleep  Associated Hypoxemia - (Greater than 5 minutes O2 sat at or below 88%) was not present. Baseline oxygen saturation was 94.1%. Lowest oxygen saturation was 85.3%. Time spent less than or equal to 88% was 0.9 minutes. Time spent less than or equal to 89% was 1.3 minutes.     Movement Activity: Nothing of note    Periodic Limb Activity - There were - PLMs during the entire study. The PLM index was - movements per hour. The PLM Arousal Index was - per hour.    REM EMG Activity - Excessive transient/sustained muscle activity was not present.    Nocturnal Behavior - Abnormal sleep related behaviors were not noted during/arising out of NREM / REM sleep.     Bruxism - None apparent.     Cardiac Summary: Appears NSR.  The average pulse rate was 56.4 bpm. The minimum pulse rate was 46.9 bpm while the maximum pulse rate was 81.1 bpm.                Assessment:     Moderate SUSIE (AHI 16.6) without sleep-associated hypoxemia and strong positional component (supine AHI 49.7, lateral AHI 11.4).     Potential that severity under-reported given no supine REM observed.     Recommendations:    Consider repeat polysomnography with full night titration of positive airway pressure therapy for the control of sleep disordered breathing.    Based on the presence of moderate obstructive sleep apnea and excessive daytime sleepiness, treatment could be empirically initiated with Auto?titrating PAP therapy with a range of 5 to 15 cmH2O. Recommend clinical follow up with sleep management team.    Patient may be a candidate for dental appliance through referral to Sleep Dentistry for the treatment of obstructive sleep apnea and/or socially disruptive snoring.    Advice regarding the risks of drowsy driving.    Suggest optimizing sleep schedule and avoiding sleep deprivation.    Weight management (if BMI > 30).     Diagnostic Codes:   Obstructive Sleep Apnea G47.33     _____________________________________   Electronically Signed By: Garfield  MD Nimo (2/25/2021)         Past medical history:    Patient Active Problem List    Diagnosis Date Noted     Fatigue, unspecified type 12/14/2020     Priority: Medium     History of electrophysiologic study for VT which was not found on 9/20/2019 10/12/2020     Priority: Medium     History of CVA (cerebrovascular accident) 10/12/2020     Priority: Medium     Chronic diastolic heart failure (H) 10/12/2020     Priority: Medium     Tobacco abuse counseling 10/12/2020     Priority: Medium     History of colonic polyps 09/24/2020     Priority: Medium     4/2/2019--hyperplastic x 1--repeat colonoscopy 5 years  6/29/2016--6 polyps, mix of tubular adenoma and hyperplastic polyps.       Nontraumatic complete tear of right rotator cuff 09/08/2020     Priority: Medium     Morbid obesity with BMI of 40.0-44.9, adult (H) 09/18/2019     Priority: Medium     Ventricular tachycardia, non-sustained (H) 09/18/2019     Priority: Medium     History of NSVT run of 27 beats on ACT   Refusing the idc   EP study done 9/20/19, unable to induce VT despite aggressive stim per EP    TTE Normal LV size and systolic function.     Normal RV size and function.     Severe LA dilatation.     Trace to mild aortic regurgitation.     Negative bubble study.       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     Priority: Medium     Blurring of visual image 10/26/2013     Priority: Medium     Hypertensive urgency 10/26/2013     Priority: Medium     Obstructive sleep apnea syndrome 10/26/2013     Priority: Medium     Other acute postprocedural pain 10/26/2013     Priority: Medium      Vitamin D deficiency 01/04/2013     Priority: Medium     IMO Update       Mixed hyperlipidemia 03/10/2010     Priority: Medium     Essential hypertension 03/10/2010     Priority: Medium     Alopecia 07/14/2009     Priority: Medium     Scarring lichen planopilaris bx proven  Scarring lichen planopilaris bx proven       Microalbuminuria 11/27/2008     Priority: Medium     Routine general medical examination at a health care facility 11/23/2008     Priority: Medium     Annual exam  11/08  BP normal 2008- elevated in the past  Lipid 2/ 2009  dyslipidemia on simvastatin  BMP 10/2008 creat 1.0   Mammo 2/2009  Pap 2005 JOSE with ovarian preservation  Colon  DXA  TSH  DM A1C 5.9 missed eye exam adm dietician  Annual exam       Type 2 diabetes mellitus with hyperglycemia, with long-term current use of insulin (H) 10/23/2008     Priority: Medium     Diabetes dx'd:       Last A1c: HGA1C      7.0   1/4/2013      Last Chol: CHOL      199   10/16/2012 TRIG      193   10/16/2012 HDL       39   10/16/2012 LDL      171   6/9/2011      Microalbumin:  ALBCR     2832   2/8/2012        GFR      >60   10/16/2012   CREAT     0.93   10/16/2012     Smoker:  YES.  Aspirin:     Yes      Last eye exam:  Diabetes dx'd:       Last A1c: HGA1C      7.0   1/4/2013      Last Chol: CHOL      199   10/16/2012 TRIG      193   10/16/2012 HDL       39   10/16/2012 LDL      171   6/9/2011      Microalbumin:  ALBCR     2832   2/8/2012        GFR      >60   10/16/2012   CREAT     0.93   10/16/2012     Smoker:  YES.  Aspirin:     Yes      Last eye exam:       Tobacco use disorder 10/03/2008     Priority: Medium     Undersocialized conduct disorder, aggressive type 05/10/2006     Priority: Medium     IMO Update       Pain of lower extremity 01/13/2006     Priority: Medium     Gastroesophageal reflux disease 10/15/2004     Priority: Medium     Morbid obesity (H) 01/27/2004     Priority: Medium     Recurrent major depressive episodes (H) 01/27/2004     Priority:  Medium     Enrolled in [place] 3/35/2010 - Inactive in [place] 7/6/2010 due to no pt contact   5/1/2013: [facility] chart review completed.  Last PHQ-9 was 20 on 4/20/13.  Consider [facility] if 9 or above/angelique; letter mailed  [place]rolled in [place] 3/35/2010 - Inactive in [place] 7/6/2010 due to no pt contact   5/1/2013: [facility] chart review completed.  Last PHQ-9 was 20 on 4/20/13.  Consider [facility] if 9 or above/angelique; letter mailed         10 point ROS of systems including Constitutional, Eyes, Respiratory, Cardiovascular, Gastroenterology, Genitourinary, Integumentary, Muscularskeletal, Psychiatric were all negative except for pertinent positives noted in my HPI.    OBJECTIVE:  There were no vitals taken for this visit.    Physical Exam  Constitutional:       General: She is not in acute distress.     Appearance: Normal appearance. She is not ill-appearing, toxic-appearing or diaphoretic.   HENT:      Head: Normocephalic and atraumatic.      Right Ear: External ear normal.      Left Ear: External ear normal.      Nose: Nose normal.   Eyes:      Conjunctiva/sclera: Conjunctivae normal.   Pulmonary:      Effort: Pulmonary effort is normal. No respiratory distress.   Skin:     Coloration: Skin is not jaundiced or pale.      Findings: No bruising or erythema.   Neurological:      General: No focal deficit present.      Mental Status: She is alert and oriented to person, place, and time.   Psychiatric:         Mood and Affect: Mood normal.         Behavior: Behavior normal.         Thought Content: Thought content normal.         Judgment: Judgment normal.          ---  This note was written with the assistance of the Dragon voice-dictation technology software. The final document, although reviewed, may contain errors. For corrections, please contact the office.    Garfield Suero MD    Sleep Medicine  [facility]  ([phone])  [facility]   ([phone])

## 2021-03-11 ENCOUNTER — DOCUMENTATION ONLY (OUTPATIENT)
Dept: HOME HEALTH SERVICES | Facility: CLINIC | Age: 56
End: 2021-03-11

## 2021-03-11 NOTE — PROGRESS NOTES
I received an order for CPAP.  I am not able to find the Data in Epic.  I have sent an email to the sleep lab requesting this.  I cannot move forward with set up until this is received.

## 2021-03-12 ENCOUNTER — TELEPHONE (OUTPATIENT)
Dept: HOME HEALTH SERVICES | Facility: CLINIC | Age: 56
End: 2021-03-12

## 2021-03-12 NOTE — TELEPHONE ENCOUNTER
Left message for a return call.  Received order for CPAP at [facility].  Called to schedule set up appointment.

## 2021-03-19 PROBLEM — I63.433: Status: ACTIVE | Noted: 2019-09-05

## 2021-03-24 ENCOUNTER — DOCUMENTATION ONLY (OUTPATIENT)
Dept: HOME HEALTH SERVICES | Facility: CLINIC | Age: 56
End: 2021-03-24

## 2021-03-24 DIAGNOSIS — G47.33 OSA (OBSTRUCTIVE SLEEP APNEA): Primary | ICD-10-CM

## 2021-03-24 DIAGNOSIS — I10 ESSENTIAL (PRIMARY) HYPERTENSION: ICD-10-CM

## 2021-03-24 NOTE — PROGRESS NOTES
Patient was offered choice of vendor and chose [facility].  Patient Rimma Sarmiento was set up at [place] on March 23, 2021. Patient received a Resmed AirSense 10 Auto. Pressures were set at 5-15 cm H2O.   Patient s ramp is Off and FLEX/EPR is EPR, 2.  Patient received a Resmed Mask name: Airfit F30  Full Face mask size Medium, heated tubing and heated humidifier.  Patient does need to meet compliance. Patient has a follow up on 05/04/2021 with Dr. Suero.    Joselito Alejandro

## 2021-03-29 ENCOUNTER — TELEPHONE (OUTPATIENT)
Dept: FAMILY MEDICINE | Facility: OTHER | Age: 56
End: 2021-03-29

## 2021-03-29 DIAGNOSIS — F41.9 ANXIETY: ICD-10-CM

## 2021-03-29 NOTE — TELEPHONE ENCOUNTER
4:45 PM    Reason for Call: Phone Call    Description: Patient called and states that the Rx she is on for anxiety is causing weight gain. She would like to discuss other options for Rx. Please call patient back for further discussion/ advisement.     Was an appointment offered for this call? No  If yes : Appointment type              Date    Preferred method for responding to this message: Telephone Call  What is your phone number ?    If we cannot reach you directly, may we leave a detailed response at the number you provided? Yes    Can this message wait until your PCP/provider returns, if available today? YES, Patient advised call back would be 03/30/2021    Lydia Santos

## 2021-03-30 RX ORDER — ESCITALOPRAM OXALATE 10 MG/1
TABLET ORAL
Qty: 90 TABLET | Refills: 1 | Status: SHIPPED | OUTPATIENT
Start: 2021-03-30 | End: 2024-02-20

## 2021-04-26 ENCOUNTER — APPOINTMENT (OUTPATIENT)
Dept: LAB | Facility: OTHER | Age: 56
End: 2021-04-26
Attending: FAMILY MEDICINE
Payer: COMMERCIAL

## 2021-04-26 ENCOUNTER — OFFICE VISIT (OUTPATIENT)
Dept: FAMILY MEDICINE | Facility: OTHER | Age: 56
End: 2021-04-26
Attending: FAMILY MEDICINE
Payer: COMMERCIAL

## 2021-04-26 VITALS
OXYGEN SATURATION: 97 % | HEART RATE: 86 BPM | WEIGHT: 230 LBS | SYSTOLIC BLOOD PRESSURE: 134 MMHG | TEMPERATURE: 98.3 F | BODY MASS INDEX: 40.1 KG/M2 | DIASTOLIC BLOOD PRESSURE: 70 MMHG

## 2021-04-26 DIAGNOSIS — Z79.4 TYPE 2 DIABETES MELLITUS WITH HYPERGLYCEMIA, WITH LONG-TERM CURRENT USE OF INSULIN (H): ICD-10-CM

## 2021-04-26 DIAGNOSIS — I50.32 CHRONIC DIASTOLIC HEART FAILURE (H): ICD-10-CM

## 2021-04-26 DIAGNOSIS — Z86.73 HISTORY OF CVA (CEREBROVASCULAR ACCIDENT): ICD-10-CM

## 2021-04-26 DIAGNOSIS — H53.8 BLURRING OF VISUAL IMAGE: ICD-10-CM

## 2021-04-26 DIAGNOSIS — I10 ESSENTIAL HYPERTENSION: ICD-10-CM

## 2021-04-26 DIAGNOSIS — E78.2 MIXED HYPERLIPIDEMIA: ICD-10-CM

## 2021-04-26 DIAGNOSIS — F17.200 TOBACCO USE DISORDER: ICD-10-CM

## 2021-04-26 DIAGNOSIS — L65.9 ALOPECIA: ICD-10-CM

## 2021-04-26 DIAGNOSIS — Z12.31 ENCOUNTER FOR SCREENING MAMMOGRAM FOR BREAST CANCER: ICD-10-CM

## 2021-04-26 DIAGNOSIS — F33.9 RECURRENT MAJOR DEPRESSIVE EPISODES (H): ICD-10-CM

## 2021-04-26 DIAGNOSIS — F41.9 ANXIETY: ICD-10-CM

## 2021-04-26 DIAGNOSIS — G47.33 OSA (OBSTRUCTIVE SLEEP APNEA): Primary | ICD-10-CM

## 2021-04-26 DIAGNOSIS — E11.65 TYPE 2 DIABETES MELLITUS WITH HYPERGLYCEMIA, WITH LONG-TERM CURRENT USE OF INSULIN (H): ICD-10-CM

## 2021-04-26 DIAGNOSIS — R80.9 MICROALBUMINURIA: ICD-10-CM

## 2021-04-26 LAB
ANION GAP SERPL CALCULATED.3IONS-SCNC: 5 MMOL/L (ref 3–14)
BUN SERPL-MCNC: 12 MG/DL (ref 7–30)
CALCIUM SERPL-MCNC: 9.4 MG/DL (ref 8.5–10.1)
CHLORIDE SERPL-SCNC: 106 MMOL/L (ref 94–109)
CO2 SERPL-SCNC: 27 MMOL/L (ref 20–32)
CREAT SERPL-MCNC: 1.07 MG/DL (ref 0.52–1.04)
CREAT UR-MCNC: 216 MG/DL
EST. AVERAGE GLUCOSE BLD GHB EST-MCNC: 143 MG/DL
GFR SERPL CREATININE-BSD FRML MDRD: 58 ML/MIN/{1.73_M2}
GLUCOSE SERPL-MCNC: 114 MG/DL (ref 70–99)
HBA1C MFR BLD: 6.6 % (ref 0–5.6)
MICROALBUMIN UR-MCNC: 2540 MG/L
MICROALBUMIN/CREAT UR: 1175.93 MG/G CR (ref 0–25)
POTASSIUM SERPL-SCNC: 3.4 MMOL/L (ref 3.4–5.3)
SODIUM SERPL-SCNC: 138 MMOL/L (ref 133–144)

## 2021-04-26 PROCEDURE — G0463 HOSPITAL OUTPT CLINIC VISIT: HCPCS

## 2021-04-26 PROCEDURE — 83036 HEMOGLOBIN GLYCOSYLATED A1C: CPT | Mod: ZL | Performed by: FAMILY MEDICINE

## 2021-04-26 PROCEDURE — 99214 OFFICE O/P EST MOD 30 MIN: CPT | Performed by: FAMILY MEDICINE

## 2021-04-26 PROCEDURE — 82043 UR ALBUMIN QUANTITATIVE: CPT | Mod: ZL | Performed by: FAMILY MEDICINE

## 2021-04-26 PROCEDURE — 36415 COLL VENOUS BLD VENIPUNCTURE: CPT | Mod: ZL | Performed by: FAMILY MEDICINE

## 2021-04-26 PROCEDURE — 999N001182 HC STATISTIC ESTIMATED AVERAGE GLUCOSE: Mod: ZL | Performed by: FAMILY MEDICINE

## 2021-04-26 PROCEDURE — 80048 BASIC METABOLIC PNL TOTAL CA: CPT | Mod: ZL | Performed by: FAMILY MEDICINE

## 2021-04-26 RX ORDER — HYDROXYZINE HYDROCHLORIDE 25 MG/1
TABLET, FILM COATED ORAL
Qty: 30 TABLET | Refills: 3 | Status: SHIPPED | OUTPATIENT
Start: 2021-04-26 | End: 2024-02-20

## 2021-04-26 ASSESSMENT — ANXIETY QUESTIONNAIRES
1. FEELING NERVOUS, ANXIOUS, OR ON EDGE: MORE THAN HALF THE DAYS
GAD7 TOTAL SCORE: 20
3. WORRYING TOO MUCH ABOUT DIFFERENT THINGS: NEARLY EVERY DAY
7. FEELING AFRAID AS IF SOMETHING AWFUL MIGHT HAPPEN: NEARLY EVERY DAY
5. BEING SO RESTLESS THAT IT IS HARD TO SIT STILL: NEARLY EVERY DAY
6. BECOMING EASILY ANNOYED OR IRRITABLE: NEARLY EVERY DAY
4. TROUBLE RELAXING: NEARLY EVERY DAY
IF YOU CHECKED OFF ANY PROBLEMS ON THIS QUESTIONNAIRE, HOW DIFFICULT HAVE THESE PROBLEMS MADE IT FOR YOU TO DO YOUR WORK, TAKE CARE OF THINGS AT HOME, OR GET ALONG WITH OTHER PEOPLE: VERY DIFFICULT
2. NOT BEING ABLE TO STOP OR CONTROL WORRYING: NEARLY EVERY DAY

## 2021-04-26 ASSESSMENT — ENCOUNTER SYMPTOMS
CHILLS: 0
RHINORRHEA: 0
NERVOUS/ANXIOUS: 1
CONSTIPATION: 0
DIZZINESS: 0
FEVER: 0
LIGHT-HEADEDNESS: 0
DIARRHEA: 0
NAUSEA: 0
SLEEP DISTURBANCE: 1
DIFFICULTY URINATING: 0
SHORTNESS OF BREATH: 0
HEARTBURN: 0
FATIGUE: 0

## 2021-04-26 ASSESSMENT — PATIENT HEALTH QUESTIONNAIRE - PHQ9: SUM OF ALL RESPONSES TO PHQ QUESTIONS 1-9: 19

## 2021-04-26 NOTE — NURSING NOTE
"Chief Complaint   Patient presents with     Diabetes     Lipids     Depression     Anxiety       Initial /70   Pulse 86   Temp 98.3  F (36.8  C) (Tympanic)   Wt 104.3 kg (230 lb)   SpO2 97%   BMI 40.10 kg/m   Estimated body mass index is 40.1 kg/m  as calculated from the following:    Height as of 12/14/20: 1.613 m (5' 3.5\").    Weight as of this encounter: 104.3 kg (230 lb).  Medication Reconciliation: complete  Ruth Dobbs LPN  "

## 2021-04-26 NOTE — LETTER
My Depression Action Plan  Name: Rimma Sarmiento   Date of Birth 1965  Date: 3/22/2021    My doctor: Melida Turner   My clinic: [facility] [place]  [address]  [place] [address]  [phone]          GREEN    ZONE   Good Control    What it looks like:     Things are going generally well. You have normal ups and downs. You may even feel depressed from time to time, but bad moods usually last less than a day.   What you need to do:  1. Continue to care for yourself (see self care plan)  2. Check your depression survival kit and update it as needed  3. Follow your physician s recommendations including any medication.  4. Do not stop taking medication unless you consult with your physician first.           YELLOW         ZONE Getting Worse    What it looks like:     Depression is starting to interfere with your life.     It may be hard to get out of bed; you may be starting to isolate yourself from others.    Symptoms of depression are starting to last most all day and this has happened for several days.     You may have suicidal thoughts but they are not constant.   What you need to do:     1. Call your care team. Your response to treatment will improve if you keep your care team informed of your progress. Yellow periods are signs an adjustment may need to be made.     2. Continue your self-care.  Just get dressed and ready for the day.  Don't give yourself time to talk yourself out of it.    3. Talk to someone in your support network.    4. Open up your Depression Self-Care Plan/Wellness Kit.           RED    ZONE Medical Alert - Get Help    What it looks like:     Depression is seriously interfering with your life.     You may experience these or other symptoms: You can t get out of bed most days, can t work or engage in other necessary activities, you have trouble taking care of basic hygiene, or basic responsibilities, thoughts of suicide or death that will not go  away, self-injurious behavior.     What you need to do:  1. Call your care team and request a same-day appointment. If they are not available (weekends or after hours) call your local crisis line, emergency room or 911.          Depression Self-Care Plan / Wellness Kit    Many people find that medication and therapy are helpful treatments for managing depression. In addition, making small changes to your everyday life can help to boost your mood and improve your wellbeing. Below are some tips for you to consider. Be sure to talk with your medical provider and/or behavioral health consultant if your symptoms are worsening or not improving.     Sleep   Sleep hygiene  means all of the habits that support good, restful sleep. It includes maintaining a consistent bedtime and wake time, using your bedroom only for sleeping or sex, and keeping the bedroom dark and free of distractions like a computer, smartphone, or television.     Develop a Healthy Routine  Maintain good hygiene. Get out of bed in the morning, make your bed, brush your teeth, take a shower, and get dressed. Don t spend too much time viewing media that makes you feel stressed. Find time to relax each day.    Exercise  Get some form of exercise every day. This will help reduce pain and release endorphins, the  feel good  chemicals in your brain. It can be as simple as just going for a walk or doing some gardening, anything that will get you moving.      Diet  Strive to eat healthy foods, including fruits and vegetables. Drink plenty of water. Avoid excessive sugar, caffeine, alcohol, and other mood-altering substances.     Stay Connected with Others  Stay in touch with friends and family members.    Manage Your Mood  Try deep breathing, massage therapy, biofeedback, or meditation. Take part in fun activities when you can. Try to find something to smile about each day.     Psychotherapy  Be open to working with a therapist if your provider recommends it.      Medication  Be sure to take your medication as prescribed. Most anti-depressants need to be taken every day. It usually takes several weeks for medications to work. Not all medicines work for all people. It is important to follow-up with your provider to make sure you have a treatment plan that is working for you. Do not stop your medication abruptly without first discussing it with your provider.    Crisis Resources   These hotlines are for both adults and children. They and are open 24 hours a day, 7 days a week unless noted otherwise.      National Suicide Prevention Lifeline   2-045-285-TALK (0419)      Crisis Text Line    www.crisistextline.org  Text HOME to 738033 from anywhere in the [place], anytime, about any type of crisis. A live, trained crisis counselor will receive the text and respond quickly.      [facility] Lifeline for LGBTQ Youth  A national crisis intervention and suicide lifeline for LGBTQ youth under 25. Provides a safe place to talk without judgement. Call 1-641.886.3782; text START to 490027 or visit www.thetrevorproject.org to talk to a trained counselor.      For [facility] crisis numbers, visit the [facility] website at:  https://[place].gov/dhs/people-we-serve/adults/health-care/mental-health/resources/crisis-contacts.jsp

## 2021-04-27 ENCOUNTER — TRANSFERRED RECORDS (OUTPATIENT)
Dept: HEALTH INFORMATION MANAGEMENT | Facility: CLINIC | Age: 56
End: 2021-04-27

## 2021-04-27 LAB — RETINOPATHY: NEGATIVE

## 2021-04-27 ASSESSMENT — ANXIETY QUESTIONNAIRES: GAD7 TOTAL SCORE: 20

## 2021-05-04 ENCOUNTER — VIRTUAL VISIT (OUTPATIENT)
Dept: SLEEP MEDICINE | Facility: HOSPITAL | Age: 56
End: 2021-05-04
Attending: FAMILY MEDICINE
Payer: COMMERCIAL

## 2021-05-04 DIAGNOSIS — E66.01 MORBID OBESITY WITH BMI OF 40.0-44.9, ADULT (H): ICD-10-CM

## 2021-05-04 DIAGNOSIS — E78.2 MIXED HYPERLIPIDEMIA: ICD-10-CM

## 2021-05-04 DIAGNOSIS — F51.04 PSYCHOPHYSIOLOGICAL INSOMNIA: ICD-10-CM

## 2021-05-04 DIAGNOSIS — E11.65 TYPE 2 DIABETES MELLITUS WITH HYPERGLYCEMIA, WITH LONG-TERM CURRENT USE OF INSULIN (H): ICD-10-CM

## 2021-05-04 DIAGNOSIS — Z79.4 TYPE 2 DIABETES MELLITUS WITH HYPERGLYCEMIA, WITH LONG-TERM CURRENT USE OF INSULIN (H): ICD-10-CM

## 2021-05-04 DIAGNOSIS — G47.33 OSA (OBSTRUCTIVE SLEEP APNEA): Primary | ICD-10-CM

## 2021-05-04 DIAGNOSIS — F41.9 ANXIETY: ICD-10-CM

## 2021-05-04 DIAGNOSIS — I10 ESSENTIAL (PRIMARY) HYPERTENSION: ICD-10-CM

## 2021-05-04 PROCEDURE — 99213 OFFICE O/P EST LOW 20 MIN: CPT | Mod: 95 | Performed by: FAMILY MEDICINE

## 2021-05-04 NOTE — PROGRESS NOTES
"Rimma is a 55 year old who is being evaluated via a billable telephone visit.      What phone number would you like to be contacted at? [phone]  How would you like to obtain your AVS? MyChart    {PROVIDER CHARTING PREFERENCE:291075}    Subjective   Rimma is a 55 year old who presents for the following health issues {ACCOMPANIED BY STATEMENT (Optional):962144}    HPI     ***    Review of Systems   {ROS COMP (Optional):824121}      Objective           Vitals:  No vitals were obtained today due to virtual visit.    Physical Exam   {GENERAL APPEARANCE:50::\"healthy\",\"alert\",\"no distress\"}  PSYCH: Alert and oriented times 3; coherent speech, normal   rate and volume, able to articulate logical thoughts, able   to abstract reason, no tangential thoughts, no hallucinations   or delusions  Her affect is { :5382777::\"normal\"}  RESP: No cough, no audible wheezing, able to talk in full sentences  Remainder of exam unable to be completed due to telephone visits    {Diagnostic Test Results (Optional):758792}    {AMBULATORY ATTESTATION (Optional):549325}        Phone call duration: *** minutes    "

## 2021-05-04 NOTE — PROGRESS NOTES
Rimma Sarmiento is a 55 year old female who is being evaluated via a billable telephone visit.       What phone number would you like to be contacted at?@ [phone]  Home Phone [phone]   Work Phone Not on file.   Mobile [phone]       How would you like to obtain your AVS? [place]       Telephone Virtual Visit Details     Type of service:  Telephone Virtual Visit     Start Time: 10:30am  End Time: 10:45 AM    Virtual visit for CPAP compliance follow-up.     Assessment:  -Moderate obstructive sleep apnea without sleep associated hypoxemia  -Significant cardiac comorbidities including uncontrolled hypertension, obesity, prior nonsustained V. tach     Plan:  -Previously, she felt that she slept better with improved quality of life with using CPAP.  Given moderate obstructive sleep apnea with uncontrolled hypertension, I feel that it is medically necessary to proceed with treatment for obstructive sleep apnea.  -Overall, I suspect the obstructive sleep apnea is being controlled with this level of CPAP, and I suspect the elevated AHI is due to her excessive mask leak.  -Based on her median and 95th percentile pressures, I would consider decreasing her pressure maximum to 7 or 8 cm H2O.  - We agreed to adjust to CPAP auto-titrate 5-8 cm H2O, pressure change order placed today.  - If doing well, plan for follow-up in 1 year.    SUBJECTIVE:  Rimma Sarmiento is a 55 year old year old female.    3/2/2021 - Virtual visit to review PSG results.  Given prior positive response and cardiovascular risk factors, restarted treatment with CPAP auto-titrate 5-15 cm H2O.    Today -she feels that restarting the CPAP is made a significant improvement in her sleep.  She feels she is sleeping better and feels she has more energy during the daytime.  While she does not directly perceive excessive mask leak, she has noted that her mouth is felt very dry in the morning.  No known residual snoring or observed apnea.    CPAP  download reviewed from March 24, 2021 through April 22, 2021 on auto titrate 5-15 cm H2O.  Used 21 out of 30 days.  Average daily usage 4 hours and 25 minutes, average usage on days used 6 hours and 18 minutes.  Use greater than or equal to 4 hours on 60% of nights.  Pressure median 5.7 cm H2O, 95th percentile 7.7 cm H2O.  AHI 12.5, with unknown apnea index of 11.3.  Leak median 72.1 L/min, 95th percentile 118 L/min.    SLEEP STUDY INTERPRETATION  DIAGNOSTIC POLYSOMNOGRAPHY REPORT        Patient: CHARLINE DE LEON  YOB: 1965  Study Date: 2/24/2021  MRN: [id number]  Referring Provider: Melida Turner  Ordering Provider: Garfield Suero     Indications for Polysomnography: The patient is a 55 year old Female. Her BMI is 41, [place] sleepiness scale 0 and neck circumference is - cm. Relevant medical history includes HTN, DM II, CVA, depression, anxiety. A diagnostic polysomnogram was performed to evaluate for sleep apnea/PLMS/RLS/RBD/S-S/CSA/hypoventilation/hypoxemia.     Polysomnogram Data: A full night polysomnogram recorded the standard physiologic parameters including EEG, EOG, EMG, ECG, nasal and oral airflow. Respiratory parameters of chest and abdominal movements were recorded with respiratory inductance plethysmography. Oxygen saturation was recorded by pulse oximetry. Hypopnea scoring rule used: 1B 4%.     Sleep Architecture: Quite fragmented, no supine REM observed  The total recording time of the polysomnogram was 520.4 minutes. The total sleep time was 368.5 minutes. Sleep latency was normal at 19.9 minutes without the use of a sleep aid. REM latency was 144.0 minutes. Arousal index was increased at 20.4 arousals per hour. Sleep efficiency was decreased at 70.8%. Wake after sleep onset was 127.5 minutes. The patient spent 11.7% of total sleep time in Stage N1, 66.1% in Stage N2, 9.1% in Stage N3, and 13.2% in REM. Time in REM supine was - minutes.     Respiration: Moderate SUSIE (AHI 16.6)  without sleep-associated hypoxemia and strong positional component (supine AHI 49.7, lateral AHI 11.4).  Potential that severity under-reported given no supine REM observed.    Events ? The polysomnogram revealed a presence of 100 obstructive, - central, and - mixed apneas resulting in an apnea index of 16.3 events per hour. There were 2 obstructive hypopneas and - central hypopneas resulting in an obstructive hypopnea index of 0.3 and central hypopnea index of - events per hour. The combined apnea/hypopnea index was 16.6 events per hour (central apnea/hypopnea index was - events per hour). The REM AHI was 37.1 events per hour. The supine AHI was 49.7 events per hour. The RERA index was - events per hour.  The RDI was 16.6 events per hour.    Snoring - was reported as moderate.    Respiratory rate and pattern - was notable for normal respiratory rate and pattern.    Sustained Sleep Associated Hypoventilation - Transcutaneous carbon dioxide monitoring was not used, however significant hypoventilation was not suggested by oximetry.    Sleep Associated Hypoxemia - (Greater than 5 minutes O2 sat at or below 88%) was not present. Baseline oxygen saturation was 94.1%. Lowest oxygen saturation was 85.3%. Time spent less than or equal to 88% was 0.9 minutes. Time spent less than or equal to 89% was 1.3 minutes.     Movement Activity: Nothing of note    Periodic Limb Activity - There were - PLMs during the entire study. The PLM index was - movements per hour. The PLM Arousal Index was - per hour.    REM EMG Activity - Excessive transient/sustained muscle activity was not present.    Nocturnal Behavior - Abnormal sleep related behaviors were not noted during/arising out of NREM / REM sleep.     Bruxism - None apparent.     Cardiac Summary: Appears NSR.  The average pulse rate was 56.4 bpm. The minimum pulse rate was 46.9 bpm while the maximum pulse rate was 81.1 bpm.       Assessment:     Moderate SUSIE (AHI 16.6) without  sleep-associated hypoxemia and strong positional component (supine AHI 49.7, lateral AHI 11.4).     Potential that severity under-reported given no supine REM observed.     Recommendations:    Consider repeat polysomnography with full night titration of positive airway pressure therapy for the control of sleep disordered breathing.    Based on the presence of moderate obstructive sleep apnea and excessive daytime sleepiness, treatment could be empirically initiated with Auto?titrating PAP therapy with a range of 5 to 15 cmH2O. Recommend clinical follow up with sleep management team.    Patient may be a candidate for dental appliance through referral to Sleep Dentistry for the treatment of obstructive sleep apnea and/or socially disruptive snoring.    Advice regarding the risks of drowsy driving.    Suggest optimizing sleep schedule and avoiding sleep deprivation.    Weight management (if BMI > 30).     Diagnostic Codes:   Obstructive Sleep Apnea G47.33     _____________________________________   Electronically Signed By: Garfield Suero MD (2/25/2021)            Past medical history:    Patient Active Problem List    Diagnosis Date Noted     Fatigue, unspecified type 12/14/2020     Priority: Medium     History of electrophysiologic study for VT which was not found on 9/20/2019 10/12/2020     Priority: Medium     History of CVA (cerebrovascular accident) 10/12/2020     Priority: Medium     Chronic diastolic heart failure (H) 10/12/2020     Priority: Medium     Tobacco abuse counseling 10/12/2020     Priority: Medium     History of colonic polyps 09/24/2020     Priority: Medium     4/2/2019--hyperplastic x 1--repeat colonoscopy 5 years  6/29/2016--6 polyps, mix of tubular adenoma and hyperplastic polyps.       Nontraumatic complete tear of right rotator cuff 09/08/2020     Priority: Medium     Morbid obesity with BMI of 40.0-44.9, adult (H) 09/18/2019     Priority: Medium     Ventricular tachycardia, non-sustained  (H) 09/18/2019     Priority: Medium     History of NSVT run of 27 beats on ACT   Refusing the idc   EP study done 9/20/19, unable to induce VT despite aggressive stim per EP    TTE Normal LV size and systolic function.     Normal RV size and function.     Severe LA dilatation.     Trace to mild aortic regurgitation.     Negative bubble study.       Left Ventricular Ejection Fraction: 60 %    Coronary CT arteries done and ruled out subclinical CAD       Cerebrovascular accident (CVA) due to bilateral embolism of posterior cerebral arteries (H) 09/05/2019     Priority: Medium     Formatting of this note might be different from the original.  MRI IMPRESSION:  1.  No acute intracranial abnormality.  2.  Normal MRI of the internal auditory canals and labyrinthine structures.  3.  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  Overview:   IMO Update       Blurring of visual image 10/26/2013     Priority: Medium     Hypertensive urgency 10/26/2013     Priority: Medium     SUSIE (obstructive sleep apnea) 10/26/2013     Priority: Medium     SLEEP STUDY INTERPRETATION  DIAGNOSTIC POLYSOMNOGRAPHY REPORT        Patient: CHARLINE DE LEON  YOB: 1965  Study Date: 2/24/2021  MRN: [id number]  Referring Provider: Melida Turner  Ordering Provider: Garfield Suero     Indications for Polysomnography: The patient is a 55 year old Female. Her BMI is 41, [place] sleepiness scale 0 and neck circumference is - cm. Relevant medical history  includes HTN, DM II, CVA, depression, anxiety. A diagnostic polysomnogram was performed to evaluate for sleep apnea/PLMS/RLS/RBD/S-S/CSA/hypoventilation/hypoxemia.     Polysomnogram Data: A full night polysomnogram recorded the standard physiologic parameters including EEG, EOG, EMG, ECG, nasal and oral airflow. Respiratory parameters of chest and abdominal movements were recorded with respiratory inductance plethysmography. Oxygen saturation was recorded by pulse oximetry. Hypopnea scoring rule used: 1B 4%.     Sleep Architecture: Quite fragmented, no supine REM observed  The total recording time of the polysomnogram was 520.4 minutes. The total sleep time was 368.5 minutes. Sleep latency was normal at 19.9 minutes without the use of a sleep aid. REM latency was 144.0 minutes. Arousal index was increased at 20.4 arousals per hour. Sleep efficiency was decreased at 70.8%. Wake after sleep onset was 127.5 minutes. The patient spent 11.7% of total sleep time in Stage N1, 66.1% in Stage N2, 9.1% in Stage N3, and 13.2% in REM. Time in REM supine was - minutes.     Respiration: Moderate SUSIE (AHI 16.6) without sleep-associated hypoxemia and strong positional component (supine AHI 49.7, lateral AHI 11.4).  Potential that severity under-reported given no supine REM observed.    Events ? The polysomnogram revealed a presence of 100 obstructive, - central, and - mixed apneas resulting in an apnea index of 16.3 events per hour. There were 2 obstructive hypopneas and - central hypopneas resulting in an obstructive hypopnea index of 0.3 and central hypopnea index of - events per hour. The combined apnea/hypopnea index was 16.6 events per hour (central apnea/hypopnea index was - events per hour). The REM AHI was 37.1 events per hour. The supine AHI was 49.7 events per hour. The RERA index was - events per hour.  The RDI was 16.6 events per hour.    Snoring - was reported as moderate.    Respiratory rate and pattern - was notable  for normal respiratory rate and pattern.    Sustained Sleep Associated Hypoventilation - Transcutaneous carbon dioxide monitoring was not used, however significant hypoventilation was not suggested by oximetry.    Sleep Associated Hypoxemia - (Greater than 5 minutes O2 sat at or below 88%) was not present. Baseline oxygen saturation was 94.1%. Lowest oxygen saturation was 85.3%. Time spent less than or equal to 88% was 0.9 minutes. Time spent less than or equal to 89% was 1.3 minutes.     Movement Activity: Nothing of note    Periodic Limb Activity - There were - PLMs during the entire study. The PLM index was - movements per hour. The PLM Arousal Index was - per hour.    REM EMG Activity - Excessive transient/sustained muscle activity was not present.    Nocturnal Behavior - Abnormal sleep related behaviors were not noted during/arising out of NREM / REM sleep.     Bruxism - None apparent.     Cardiac Summary: Appears NSR.  The average pulse rate was 56.4 bpm. The minimum pulse rate was 46.9 bpm while the maximum pulse rate was 81.1 bpm.                Assessment:     Moderate SUSIE (AHI 16.6) without sleep-associated hypoxemia and strong positional component (supine AHI 49.7, lateral AHI 11.4).     Potential that severity under-reported given no supine REM observed.     Recommendations:    Consider repeat polysomnography with full night titration of positive airway pressure therapy for the control of sleep disordered breathing.    Based on the presence of moderate obstructive sleep apnea and excessive daytime sleepiness, treatment could be empirically initiated with Auto?titrating PAP therapy with a range of 5 to 15 cmH2O. Recommend clinical follow up with sleep management team.    Patient may be a candidate for dental appliance through referral to Sleep Dentistry for the treatment of obstructive sleep apnea and/or socially disruptive snoring.    Advice regarding the risks of drowsy driving.    Suggest optimizing  sleep schedule and avoiding sleep deprivation.    Weight management (if BMI > 30).     Diagnostic Codes:   Obstructive Sleep Apnea G47.33     _____________________________________   Electronically Signed By: Garfield Suero MD (2/25/2021)            Other acute postprocedural pain 10/26/2013     Priority: Medium     Vitamin D deficiency 01/04/2013     Priority: Medium     IMO Update       Mixed hyperlipidemia 03/10/2010     Priority: Medium     Essential hypertension 03/10/2010     Priority: Medium     Alopecia 07/14/2009     Priority: Medium     Scarring lichen planopilaris bx proven  Scarring lichen planopilaris bx proven       Microalbuminuria 11/27/2008     Priority: Medium     Routine general medical examination at a health care facility 11/23/2008     Priority: Medium     Annual exam  11/08  BP normal 2008- elevated in the past  Lipid 2/ 2009  dyslipidemia on simvastatin  BMP 10/2008 creat 1.0   Mammo 2/2009  Pap 2005 JOSE with ovarian preservation  Colon  DXA  TSH  DM A1C 5.9 missed eye exam adm dietician  Annual exam       Type 2 diabetes mellitus with hyperglycemia, with long-term current use of insulin (H) 10/23/2008     Priority: Medium     Diabetes dx'd:       Last A1c: HGA1C      7.0   1/4/2013      Last Chol: CHOL      199   10/16/2012 TRIG      193   10/16/2012 HDL       39   10/16/2012 LDL      171   6/9/2011      Microalbumin:  ALBCR     2832   2/8/2012        GFR      >60   10/16/2012   CREAT     0.93   10/16/2012     Smoker:  YES.  Aspirin:     Yes      Last eye exam:  Diabetes dx'd:       Last A1c: HGA1C      7.0   1/4/2013      Last Chol: CHOL      199   10/16/2012 TRIG      193   10/16/2012 HDL       39   10/16/2012 LDL      171   6/9/2011      Microalbumin:  ALBCR     2832   2/8/2012        GFR      >60   10/16/2012   CREAT     0.93   10/16/2012     Smoker:  YES.  Aspirin:     Yes      Last eye exam:       Tobacco use disorder 10/03/2008     Priority: Medium     Undersocialized conduct  disorder, aggressive type 05/10/2006     Priority: Medium     IMO Update       Pain of lower extremity 01/13/2006     Priority: Medium     Gastroesophageal reflux disease 10/15/2004     Priority: Medium     Morbid obesity (H) 01/27/2004     Priority: Medium     Recurrent major depressive episodes (H) 01/27/2004     Priority: Medium     [place]rolled in [place] 3/35/2010 - Inactive in [place] 7/6/2010 due to no pt contact   5/1/2013: [facility] chart review completed.  Last PHQ-9 was 20 on 4/20/13.  Consider [facility] if 9 or above/angelique; letter mailed  [place]rolled in [place] 3/35/2010 - Inactive in [place] 7/6/2010 due to no pt contact   5/1/2013: [facility] chart review completed.  Last PHQ-9 was 20 on 4/20/13.  Consider [facility] if 9 or above/angelique; letter mailed         10 point ROS of systems including Constitutional, Eyes, Respiratory, Cardiovascular, Gastroenterology, Genitourinary, Integumentary, Muscularskeletal, Psychiatric were all negative except for pertinent positives noted in my HPI.    Current Outpatient Medications   Medication Sig Dispense Refill     aspirin 81 MG EC tablet TAKE 1 TABLET DAILY BY MOUTH 90 tablet 3     atorvastatin (LIPITOR) 80 MG tablet TAKE 1 TABLET DAILY BY MOUTH 90 tablet 1     blood glucose (NO BRAND SPECIFIED) test strip Use to test blood sugar 3 times daily or as directed. To accompany: Blood Glucose Monitor Brands: per insurance. 300 strip 3     Blood Glucose Monitoring Suppl (FIFTY50 GLUCOSE METER 2.0) w/Device KIT Dispense meter, test strips, lancets covered by pt ins. E11.9 IDDM type II - Test 3 times/day.       carvedilol (COREG) 25 MG tablet Take 1 tablet (25 mg) by mouth 2 times daily (with meals) 180 tablet 3     clindamycin (CLINDAMAX) 1 % external gel        clopidogrel (PLAVIX) 75 MG tablet Take 75 mg by mouth       escitalopram (LEXAPRO) 10 MG tablet TAKE 1 TABLET DAILY BY MOUTH 90 tablet 1     fluocinonide (LIDEX) 0.05 % external solution APPLY TOPICALLY AS NEEDED 60 mL 0      hydrochlorothiazide (HYDRODIURIL) 25 MG tablet TAKE 1 TABLET DAILY BY MOUTH 90 tablet 1     hydrOXYzine (ATARAX) 25 MG tablet TAKE 1 TABLET(25 MG) BY MOUTH EVERY NIGHT AS NEEDED FOR ANXIETY 30 tablet 3     ibuprofen (ADVIL/MOTRIN) 200 MG tablet Take 200 mg by mouth       insulin aspart (NOVOLOG FLEXPEN) 100 UNIT/ML pen INJECT 6 UNITS UNDER THE SKIN WITH BREAKFAST, 4 UNITS WITH LUNCH AND 3 UNITS WITH DINNER       insulin aspart (NOVOLOG VIAL) 100 UNITS/ML vial        insulin glargine (LANTUS SOLOSTAR) 100 UNIT/ML pen Inject 32 Units Subcutaneous       insulin glargine (LANTUS VIAL) 100 UNIT/ML vial        losartan (COZAAR) 100 MG tablet TAKE 1 TABLET BY MOUTH DAILY 30 tablet 3     nicotine (NICOTROL) 10 MG inhaler        pantoprazole (PROTONIX) 40 MG EC tablet Take 1 tablet (40 mg) by mouth daily 90 tablet 1     zolpidem (AMBIEN) 5 MG tablet Take tablet by mouth 15 minutes prior to sleep, for Sleep Study 1 tablet 0       OBJECTIVE:  There were no vitals taken for this visit.    Physical Exam:  healthy, alert and no distress  PSYCH: Alert and oriented times 3; coherent speech, normal   rate and volume, able to articulate logical thoughts, able   to abstract reason, no tangential thoughts, no hallucinations   or delusions  His affect is normal  RESP: No cough, no audible wheezing, able to talk in full sentences  Remainder of exam unable to be completed due to telephone visits    ---  This note was written with the assistance of the Dragon voice-dictation technology software. The final document, although reviewed, may contain errors. For corrections, please contact the office.    Garfield Suero MD    Sleep Medicine  [facility]  ([phone])  [facility]  ([phone])

## 2021-05-12 DIAGNOSIS — E11.65 TYPE 2 DIABETES MELLITUS WITH HYPERGLYCEMIA, WITH LONG-TERM CURRENT USE OF INSULIN (H): Primary | ICD-10-CM

## 2021-05-12 DIAGNOSIS — Z79.4 TYPE 2 DIABETES MELLITUS WITH HYPERGLYCEMIA, WITH LONG-TERM CURRENT USE OF INSULIN (H): Primary | ICD-10-CM

## 2021-05-12 NOTE — TELEPHONE ENCOUNTER
Addie      Last Written Prescription Date:  historical  Last Fill Quantity: n/a,   # refills: n/a  Last Office Visit: 04/26/21  Future Office visit:    Next 5 appointments (look out 90 days)    May 17, 2021  4:00 PM  (Arrive by 3:45 PM)  SHORT with Melida Turner MD  [facility] [place] [facility] [address]  [place] [address]  [phone]           Routing refill request to provider for review/approval because:  Medication is reported/historical    PCP Johnny

## 2021-10-13 NOTE — PROGRESS NOTES
SLEEP STUDY INTERPRETATION  DIAGNOSTIC POLYSOMNOGRAPHY REPORT        Patient: CHARLINE DE LEON  YOB: 1965  Study Date: 2/24/2021  MRN: [id number]  Referring Provider: Melida Turner  Ordering Provider: Garfield Suero     Indications for Polysomnography: The patient is a 55 year old Female. Her BMI is 41, [place] sleepiness scale 0 and neck circumference is - cm. Relevant medical history includes HTN, DM II, CVA, depression, anxiety. A diagnostic polysomnogram was performed to evaluate for sleep apnea/PLMS/RLS/RBD/S-S/CSA/hypoventilation/hypoxemia.     Polysomnogram Data: A full night polysomnogram recorded the standard physiologic parameters including EEG, EOG, EMG, ECG, nasal and oral airflow. Respiratory parameters of chest and abdominal movements were recorded with respiratory inductance plethysmography. Oxygen saturation was recorded by pulse oximetry. Hypopnea scoring rule used: 1B 4%.     Sleep Architecture: Quite fragmented, no supine REM observed  The total recording time of the polysomnogram was 520.4 minutes. The total sleep time was 368.5 minutes. Sleep latency was normal at 19.9 minutes without the use of a sleep aid. REM latency was 144.0 minutes. Arousal index was increased at 20.4 arousals per hour. Sleep efficiency was decreased at 70.8%. Wake after sleep onset was 127.5 minutes. The patient spent 11.7% of total sleep time in Stage N1, 66.1% in Stage N2, 9.1% in Stage N3, and 13.2% in REM. Time in REM supine was - minutes.     Respiration: Moderate SUSIE (AHI 16.6) without sleep-associated hypoxemia and strong positional component (supine AHI 49.7, lateral AHI 11.4).  Potential that severity under-reported given no supine REM observed.    Events ? The polysomnogram revealed a presence of 100 obstructive, - central, and - mixed apneas resulting in an apnea index of 16.3 events per hour. There were 2 obstructive hypopneas and - central hypopneas resulting in an obstructive hypopnea  index of 0.3 and central hypopnea index of - events per hour. The combined apnea/hypopnea index was 16.6 events per hour (central apnea/hypopnea index was - events per hour). The REM AHI was 37.1 events per hour. The supine AHI was 49.7 events per hour. The RERA index was - events per hour.  The RDI was 16.6 events per hour.    Snoring - was reported as moderate.    Respiratory rate and pattern - was notable for normal respiratory rate and pattern.    Sustained Sleep Associated Hypoventilation - Transcutaneous carbon dioxide monitoring was not used, however significant hypoventilation was not suggested by oximetry.    Sleep Associated Hypoxemia - (Greater than 5 minutes O2 sat at or below 88%) was not present. Baseline oxygen saturation was 94.1%. Lowest oxygen saturation was 85.3%. Time spent less than or equal to 88% was 0.9 minutes. Time spent less than or equal to 89% was 1.3 minutes.     Movement Activity: Nothing of note    Periodic Limb Activity - There were - PLMs during the entire study. The PLM index was - movements per hour. The PLM Arousal Index was - per hour.    REM EMG Activity - Excessive transient/sustained muscle activity was not present.    Nocturnal Behavior - Abnormal sleep related behaviors were not noted during/arising out of NREM / REM sleep.     Bruxism - None apparent.     Cardiac Summary: Appears NSR.  The average pulse rate was 56.4 bpm. The minimum pulse rate was 46.9 bpm while the maximum pulse rate was 81.1 bpm.                Assessment:     Moderate SUSIE (AHI 16.6) without sleep-associated hypoxemia and strong positional component (supine AHI 49.7, lateral AHI 11.4).     Potential that severity under-reported given no supine REM observed.     Recommendations:    Consider repeat polysomnography with full night titration of positive airway pressure therapy for the control of sleep disordered breathing.    Based on the presence of moderate obstructive sleep apnea and excessive daytime  sleepiness, treatment could be empirically initiated with Auto?titrating PAP therapy with a range of 5 to 15 cmH2O. Recommend clinical follow up with sleep management team.    Patient may be a candidate for dental appliance through referral to Sleep Dentistry for the treatment of obstructive sleep apnea and/or socially disruptive snoring.    Advice regarding the risks of drowsy driving.    Suggest optimizing sleep schedule and avoiding sleep deprivation.    Weight management (if BMI > 30).     Diagnostic Codes:   Obstructive Sleep Apnea G47.33     _____________________________________   Electronically Signed By: Garfield Suero MD (2/25/2021)

## 2022-02-02 ENCOUNTER — TELEPHONE (OUTPATIENT)
Dept: DERMATOLOGY | Facility: CLINIC | Age: 57
End: 2022-02-02
Payer: COMMERCIAL

## 2022-02-02 NOTE — TELEPHONE ENCOUNTER
NALINI for patient to schedule 4 [facility] Dr. Naqvi.

## 2022-06-15 NOTE — PROGRESS NOTES
[facility] Dermatology Note  Encounter Date: Jun 21, 2022  Office Visit     Dermatology Problem List:  1. 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 Counseled patient on the etiology of each condition. Discussed treatment options including ILK injections, doxycycline, and topical medications.  - Start OTC minoxidil 5% foam or solution once daily. Keep away from face, counseled on risk of irritation and hair growth on face, risk of temporary shedding. Keep product away from face. Stop if you become pregnant. Handout provided  - Continue fluocinolone oil once daily for 6 weeks then three times weekly after.   - Start doxycycline 100 mg BID. Recommend that patient try tocalcium-containing products around the time of taking the medication.  Instructed to take with a full glass of fluid and food, not lying down.  Side effects of photosensitivity, headaches, GI upset discussed. Recommend sun protection.   - Recommend head&shoulder oil line   - For alopecia, will obtain TSH with reflex T4, CBC with Diff, ferritin, zinc, and Vitamin D. (Currently taking vitamin D.)      Procedures Performed:     Kenalog intralesional injection procedure note: After verbal consent and discussion of risks including but not limited to atrophy, pain, and bruising, time out was performed, the patient underwent positioning and the area was prepped with isopropyl alcohol, 2 total cc of Kenalog 2.5 mg/cc was injected into 40 sites sites on the mid frontal and frontal scalp. The patient tolerated the procedure well and left the Dermatology clinic in good condition.      Follow-up: 8 week(s)  Injections 3cc  Kenalog 2.5mg/cc ready and skin exam fall or earlier for new or changing lesions    Staff and Scribe:     Scribe Disclosure:    I, Ray Castellanos, am serving as a scribe to document services personally performed by this physician, Dr. Jaleesa Naqvi, based on data collection and the provider's statements to me.     Provider Disclosure:   The documentation recorded by the scribe accurately reflects the services I personally performed and the decisions made by me.    Jaleesa Naqvi MD    Department of Dermatology  [facility]: Phone: [phone], Fax:[phone]  [facility]: Phone: [phone], Fax: [phone]      ____________________________________________    CC: Hair Loss (Using fluocinonide with improvement. Patient states Rogaine is out of budget.)    HPI:  Ms. Rimma Sarmiento is a(n) 56 year old female who presents today as a new patient for hair loss.     Referred to derm for alopecia. Note on 4/26/21 was reviewed.    Today, patient notes hair loss improvement with fluocinonide. Did not started Rogaine due to not being affordable.     Hair loss started at age 35, and has gotten worse due to stress. Could not afford treatment previously.     Started at the apex of the scalp. May be due to a wig she used. Has improved after she stopped wearing the wig.     Has tried sulferate. Has never tried.     Denies any history of autoimmune disease. Denies any biopsy done or lab testing. Notes she had a stroke 7 years ago.     Daughter is present today.     Patient is otherwise feeling well, without additional skin concerns.    Labs Reviewed:  N/A    Physical Exam:  Vitals: There were no vitals taken for this visit.  SKIN: Focused examination of scalp was performed.  - scarring and loss of hair on the frontal scalp   - no folliculitis or scale   - makeup on eyebrows   - loss of hair at the folliculitis osteo   - terminal hair at the frontal scalp  - No other lesions of concern on areas examined.     Medications:  Current  Outpatient Medications   Medication     aspirin 81 MG EC tablet     atorvastatin (LIPITOR) 80 MG tablet     blood glucose (NO BRAND SPECIFIED) test strip     Blood Glucose Monitoring Suppl (FIFTY50 GLUCOSE METER 2.0) w/Device KIT     carvedilol (COREG) 25 MG tablet     clindamycin (CLINDAMAX) 1 % external gel     clopidogrel (PLAVIX) 75 MG tablet     escitalopram (LEXAPRO) 10 MG tablet     fluocinonide (LIDEX) 0.05 % external solution     hydrochlorothiazide (HYDRODIURIL) 25 MG tablet     hydrOXYzine (ATARAX) 25 MG tablet     ibuprofen (ADVIL/MOTRIN) 200 MG tablet     insulin aspart (NOVOLOG FLEXPEN) 100 UNIT/ML pen     insulin aspart (NOVOLOG VIAL) 100 UNITS/ML vial     insulin glargine (LANTUS PEN) 100 UNIT/ML pen     insulin glargine (LANTUS PEN) 100 UNIT/ML pen     losartan (COZAAR) 100 MG tablet     nicotine (NICOTROL) 10 MG inhaler     pantoprazole (PROTONIX) 40 MG EC tablet     zolpidem (AMBIEN) 5 MG tablet     No current facility-administered medications for this visit.      Past Medical History:   Patient Active Problem List   Diagnosis     Alopecia     Anomalous optic nerve (H)     Anxiety     Blurring of visual image     Decreased peripheral vision     Gastroesophageal reflux disease     Homonymous hemianopsia due to old cerebral infarction     Mixed hyperlipidemia     Essential hypertension     Hypertensive urgency     Impingement syndrome of right shoulder     Microalbuminuria     Morbid obesity (H)     Morbid obesity with BMI of 40.0-44.9, adult (H)     Nontraumatic complete tear of right rotator cuff     SUSIE (obstructive sleep apnea)     Other acute postprocedural pain     Pain of lower extremity     Recurrent major depressive episodes (H)     Routine general medical examination at a health care facility     Tobacco use disorder     Type 2 diabetes mellitus with hyperglycemia, with long-term current use of insulin (H)     Undersocialized conduct disorder, aggressive type     Ventricular tachycardia,  non-sustained (H)     Vitamin D deficiency     History of colonic polyps     History of electrophysiologic study for VT which was not found on 9/20/2019     History of CVA (cerebrovascular accident)     Chronic diastolic heart failure (H)     Tobacco abuse counseling     Fatigue, unspecified type     Cerebral infarction (H)     Cerebrovascular accident (CVA) due to bilateral embolism of posterior cerebral arteries (H)     Past Medical History:   Diagnosis Date     Anxiety      Depression      DM (diabetes mellitus) (H)      High cholesterol 09/17/2019     Hypertension      Sleep apnea      Stroke (H)      Type 2 diabetes mellitus (H) 10/23/2008        CC Melida Turner MD  [address] [facility] [address] [facility] [address] [address] on close of this encounter.

## 2022-06-21 ENCOUNTER — OFFICE VISIT (OUTPATIENT)
Dept: DERMATOLOGY | Facility: CLINIC | Age: 57
End: 2022-06-21
Payer: COMMERCIAL

## 2022-06-21 DIAGNOSIS — L66.10 LICHEN PLANOPILARIS: Primary | ICD-10-CM

## 2022-06-21 PROBLEM — H04.123 DRY EYES, BILATERAL: Status: ACTIVE | Noted: 2022-02-23

## 2022-06-21 PROBLEM — H25.813 COMBINED FORMS OF AGE-RELATED CATARACT OF BOTH EYES: Status: ACTIVE | Noted: 2022-02-23

## 2022-06-21 PROBLEM — H90.3 SENSORINEURAL HEARING LOSS, BILATERAL: Status: ACTIVE | Noted: 2022-03-24

## 2022-06-21 PROBLEM — N18.2 CKD STAGE G2/A3, GFR 60-89 AND ALBUMIN CREATININE RATIO >300 MG/G: Status: ACTIVE | Noted: 2022-01-18

## 2022-06-21 PROBLEM — H52.203 MYOPIA OF BOTH EYES WITH ASTIGMATISM AND PRESBYOPIA: Status: ACTIVE | Noted: 2022-02-23

## 2022-06-21 PROBLEM — H52.13 MYOPIA OF BOTH EYES WITH ASTIGMATISM AND PRESBYOPIA: Status: ACTIVE | Noted: 2022-02-23

## 2022-06-21 PROBLEM — H52.4 MYOPIA OF BOTH EYES WITH ASTIGMATISM AND PRESBYOPIA: Status: ACTIVE | Noted: 2022-02-23

## 2022-06-21 PROCEDURE — 99203 OFFICE O/P NEW LOW 30 MIN: CPT | Mod: 25 | Performed by: DERMATOLOGY

## 2022-06-21 PROCEDURE — 11901 INJECT SKIN LESIONS >7: CPT | Performed by: DERMATOLOGY

## 2022-06-21 RX ORDER — FLUOCINOLONE ACETONIDE 0.11 MG/ML
OIL TOPICAL
Qty: 60 ML | Refills: 3 | Status: SHIPPED | OUTPATIENT
Start: 2022-06-21

## 2022-06-21 RX ORDER — DOXYCYCLINE 100 MG/1
100 CAPSULE ORAL 2 TIMES DAILY
Qty: 180 CAPSULE | Refills: 0 | Status: SHIPPED | OUTPATIENT
Start: 2022-06-21 | End: 2022-08-19

## 2022-06-21 ASSESSMENT — PAIN SCALES - GENERAL: PAINLEVEL: NO PAIN (0)

## 2022-06-21 NOTE — PATIENT INSTRUCTIONS
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 Can buy at [employer] online:              Topical Rogaine (Minoxidil) for Pattern Hair Loss    Minoxidil is an FDA approved over the counter topical for the treatment of hair loss and thinning hair in men and women.   Initially a 2% solution was available however this required application twice daily. A 5% solution is also now approved, only requiring application once per day.     Available Products:   Rogaine 5% solution: Packaged for men however can be used by men or women. Use dropper and apply directly to scalp at bedtime. This product can cause an allergy because of presence of propylene glycol. Stop this product if you develop a rash or itching and contact your physician.   Rogaine 5% foam: Packaged for men and women: Apply foam directly to the scalp once daily. This is less greasy compared to the solution. This formula is preferred for those who had a reaction to the solution product. If you develop rash or itching, stop the product and contact your physician.     What if I stop minoxidil topical?   After stopping minoxidil the hair will return to the usual pattern of thinning. Using the product 3-4 times per week is better than not using product at all.       Can I use generic minoxidil?   Yes, look for 5% minoxidil.     What are the side effects?  The most common side effect is rash or itching of the scalp. This can occur if a contact allergy develops with propylene glycol. A small group of patients noticed the appearance of facial hair if the product runs onto the face or with prolonged use. Keep it away from the face.  This can cause temporary shedding. Please, call us if this happens.  This can irritated the scalp. Please, call us if this happens.  Stop this product if you become pregnant or are breastfeeding      Last updated:  11/2/2021      Intralesional Kenalog (ILK) Injections    Intralesional steroid injection involves a corticosteroid, such as triamcinolone acetonide (brand name Kenalog), which is injected directly into a lesion on or immediately below the skin. Intralesional kenalog may be used to treat the following skin conditions:    Alopecia areata  Discoid lupus erythematosus  Keloids/hypertrophic scars  Granuloma annulare and other granulomatous disorders  Hypertrophic lichen planus  Lichen simplex chronicus (neurodermatitis)  Localised psoriasis  Necrobiosis lipoidica  Acne cysts (nodulocystic acne)  Small infantile hemangiomas    Possible side-effects of intralesional Kenalog (ILK) injections include bleeding, pain, infection, contact dermatitis, nerve damage, scar formation and need for a repeat procedure.    Some people may experience delayed side-effects including:  Lipoatrophy, appearing as skin indentations or dimples around the injection sites a few weeks after treatment; these may be permanent.  White marks (leukoderma) or brown marks (postinflammatory pigmentation) at the site of injection or spreading from the site of injection - these may resolve or persist long term.  Telangiectasia, or small dilated blood vessels at the site of injection.   Increased hair growth at the site of injection - this resolves eventually.  Steroid acne: steroids increase growth hormone, leading to increased sebum (oil) production by the sebaceous glands. Steroid acne generally improves once the steroid has been stopped.      Who should I call with questions?  [facility]: [phone]   [facility]: [phone]  For urgent needs outside of business hours call the [facility] at [phone] and ask for the dermatology resident on call

## 2022-06-21 NOTE — LETTER
6/21/2022         RE: Rimma Sarmiento  [address]  [place] [address]        Dear Colleague,    Thank you for referring your patient, Rimma Sarmiento, to the [facility]. Please see a copy of my visit note below.    [facility] Dermatology Note  Encounter Date: Jun 21, 2022  Office Visit     Dermatology Problem List:  1. 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 Counseled patient on the etiology of each condition. Discussed treatment options including ILK injections, doxycycline, and topical medications.  - Start OTC minoxidil 5% foam or solution once daily. Keep away from face, counseled on risk of irritation and hair growth on face, risk of temporary shedding. Keep product away from face. Stop if you become pregnant. Handout provided  - Continue fluocinolone oil once daily for 6 weeks then three times weekly after.   - Start doxycycline 100 mg BID. Recommend that patient try tocalcium-containing products around the time of taking the medication.  Instructed to take with a full glass of fluid and food, not lying down.  Side effects of photosensitivity, headaches, GI upset discussed. Recommend sun protection.   - Recommend head&shoulder oil line   - For alopecia, will obtain TSH with reflex T4, CBC with Diff, ferritin, zinc, and Vitamin D. (Currently taking vitamin D.)      Procedures Performed:     Kenalog intralesional injection procedure note: After verbal consent and discussion of risks including but not limited to atrophy, pain, and bruising, time out was performed, the patient underwent positioning and the area was prepped with isopropyl alcohol, 2 total cc of Kenalog 2.5 mg/cc was injected into 40 sites sites on the mid frontal and frontal  scalp. The patient tolerated the procedure well and left the Dermatology clinic in good condition.      Follow-up: 8 week(s)  Injections 3cc  Kenalog 2.5mg/cc ready and skin exam fall or earlier for new or changing lesions    Staff and Scribe:     Scribe Disclosure:   I, Ray Castellanos, am serving as a scribe to document services personally performed by this physician, Dr. Jaleesa Naqvi, based on data collection and the provider's statements to me.     Provider Disclosure:   The documentation recorded by the scribe accurately reflects the services I personally performed and the decisions made by me.    Jaleesa Naqvi MD    Department of Dermatology  [facility]: Phone: [phone], Fax:[phone]  [facility]: Phone: [phone], Fax: [phone]      ____________________________________________    CC: Hair Loss (Using fluocinonide with improvement. Patient states Rogaine is out of budget.)    HPI:  Ms. Rimma Sarmiento is a(n) 56 year old female who presents today as a new patient for hair loss.     Referred to derm for alopecia. Note on 4/26/21 was reviewed.    Today, patient notes hair loss improvement with fluocinonide. Did not started Rogaine due to not being affordable.     Hair loss started at age 35, and has gotten worse due to stress. Could not afford treatment previously.     Started at the apex of the scalp. May be due to a wig she used. Has improved after she stopped wearing the wig.     Has tried sulferate. Has never tried.     Denies any history of autoimmune disease. Denies any biopsy done or lab testing. Notes she had a stroke 7 years ago.     Daughter is present today.     Patient is otherwise feeling well, without additional skin concerns.    Labs Reviewed:  N/A    Physical Exam:  Vitals: There were no vitals taken for this visit.  SKIN: Focused examination of scalp was  performed.  - scarring and loss of hair on the frontal scalp   - no folliculitis or scale   - makeup on eyebrows   - loss of hair at the folliculitis osteo   - terminal hair at the frontal scalp  - No other lesions of concern on areas examined.     Medications:  Current Outpatient Medications   Medication     aspirin 81 MG EC tablet     atorvastatin (LIPITOR) 80 MG tablet     blood glucose (NO BRAND SPECIFIED) test strip     Blood Glucose Monitoring Suppl (FIFTY50 GLUCOSE METER 2.0) w/Device KIT     carvedilol (COREG) 25 MG tablet     clindamycin (CLINDAMAX) 1 % external gel     clopidogrel (PLAVIX) 75 MG tablet     escitalopram (LEXAPRO) 10 MG tablet     fluocinonide (LIDEX) 0.05 % external solution     hydrochlorothiazide (HYDRODIURIL) 25 MG tablet     hydrOXYzine (ATARAX) 25 MG tablet     ibuprofen (ADVIL/MOTRIN) 200 MG tablet     insulin aspart (NOVOLOG FLEXPEN) 100 UNIT/ML pen     insulin aspart (NOVOLOG VIAL) 100 UNITS/ML vial     insulin glargine (LANTUS PEN) 100 UNIT/ML pen     insulin glargine (LANTUS PEN) 100 UNIT/ML pen     losartan (COZAAR) 100 MG tablet     nicotine (NICOTROL) 10 MG inhaler     pantoprazole (PROTONIX) 40 MG EC tablet     zolpidem (AMBIEN) 5 MG tablet     No current facility-administered medications for this visit.      Past Medical History:   Patient Active Problem List   Diagnosis     Alopecia     Anomalous optic nerve (H)     Anxiety     Blurring of visual image     Decreased peripheral vision     Gastroesophageal reflux disease     Homonymous hemianopsia due to old cerebral infarction     Mixed hyperlipidemia     Essential hypertension     Hypertensive urgency     Impingement syndrome of right shoulder     Microalbuminuria     Morbid obesity (H)     Morbid obesity with BMI of 40.0-44.9, adult (H)     Nontraumatic complete tear of right rotator cuff     SUSIE (obstructive sleep apnea)     Other acute postprocedural pain     Pain of lower extremity     Recurrent major depressive episodes  (H)     Routine general medical examination at a health care facility     Tobacco use disorder     Type 2 diabetes mellitus with hyperglycemia, with long-term current use of insulin (H)     Undersocialized conduct disorder, aggressive type     Ventricular tachycardia, non-sustained (H)     Vitamin D deficiency     History of colonic polyps     History of electrophysiologic study for VT which was not found on 9/20/2019     History of CVA (cerebrovascular accident)     Chronic diastolic heart failure (H)     Tobacco abuse counseling     Fatigue, unspecified type     Cerebral infarction (H)     Cerebrovascular accident (CVA) due to bilateral embolism of posterior cerebral arteries (H)     Past Medical History:   Diagnosis Date     Anxiety      Depression      DM (diabetes mellitus) (H)      High cholesterol 09/17/2019     Hypertension      Sleep apnea      Stroke (H)      Type 2 diabetes mellitus (H) 10/23/2008        CC Melida Turner MD  [address] [address] on close of this encounter.      Again, thank you for allowing me to participate in the care of your patient.        Sincerely,        Jaleesa Naqvi MD

## 2022-06-28 ENCOUNTER — LAB (OUTPATIENT)
Dept: LAB | Facility: CLINIC | Age: 57
End: 2022-06-28
Payer: COMMERCIAL

## 2022-06-28 DIAGNOSIS — L66.10 LICHEN PLANOPILARIS: ICD-10-CM

## 2022-06-28 LAB
BASOPHILS # BLD AUTO: 0.1 10E3/UL (ref 0–0.2)
BASOPHILS NFR BLD AUTO: 1 %
EOSINOPHIL # BLD AUTO: 0.1 10E3/UL (ref 0–0.7)
EOSINOPHIL NFR BLD AUTO: 1 %
ERYTHROCYTE [DISTWIDTH] IN BLOOD BY AUTOMATED COUNT: 13.6 % (ref 10–15)
FERRITIN SERPL-MCNC: 187 NG/ML (ref 8–252)
HCT VFR BLD AUTO: 39.3 % (ref 35–47)
HGB BLD-MCNC: 12.7 G/DL (ref 11.7–15.7)
IMM GRANULOCYTES # BLD: 0 10E3/UL
IMM GRANULOCYTES NFR BLD: 0 %
LYMPHOCYTES # BLD AUTO: 2.2 10E3/UL (ref 0.8–5.3)
LYMPHOCYTES NFR BLD AUTO: 22 %
MCH RBC QN AUTO: 26 PG (ref 26.5–33)
MCHC RBC AUTO-ENTMCNC: 32.3 G/DL (ref 31.5–36.5)
MCV RBC AUTO: 80 FL (ref 78–100)
MONOCYTES # BLD AUTO: 0.6 10E3/UL (ref 0–1.3)
MONOCYTES NFR BLD AUTO: 6 %
NEUTROPHILS # BLD AUTO: 6.9 10E3/UL (ref 1.6–8.3)
NEUTROPHILS NFR BLD AUTO: 70 %
NRBC # BLD AUTO: 0 10E3/UL
NRBC BLD AUTO-RTO: 0 /100
PLATELET # BLD AUTO: 360 10E3/UL (ref 150–450)
RBC # BLD AUTO: 4.89 10E6/UL (ref 3.8–5.2)
TSH SERPL DL<=0.005 MIU/L-ACNC: 1.11 MU/L (ref 0.4–4)
WBC # BLD AUTO: 9.9 10E3/UL (ref 4–11)

## 2022-06-28 PROCEDURE — 84630 ASSAY OF ZINC: CPT | Mod: 90 | Performed by: DERMATOLOGY

## 2022-06-28 PROCEDURE — 85025 COMPLETE CBC W/AUTO DIFF WBC: CPT

## 2022-06-28 PROCEDURE — 82306 VITAMIN D 25 HYDROXY: CPT | Performed by: DERMATOLOGY

## 2022-06-28 PROCEDURE — 99000 SPECIMEN HANDLING OFFICE-LAB: CPT | Performed by: DERMATOLOGY

## 2022-06-28 PROCEDURE — 36415 COLL VENOUS BLD VENIPUNCTURE: CPT

## 2022-06-28 PROCEDURE — 84443 ASSAY THYROID STIM HORMONE: CPT | Performed by: DERMATOLOGY

## 2022-06-28 PROCEDURE — 82728 ASSAY OF FERRITIN: CPT | Performed by: DERMATOLOGY

## 2022-06-29 LAB — DEPRECATED CALCIDIOL+CALCIFEROL SERPL-MC: 24 UG/L (ref 20–75)

## 2022-06-30 LAB — ZINC SERPL-MCNC: 64.7 UG/DL

## 2022-07-05 ENCOUNTER — TELEPHONE (OUTPATIENT)
Dept: DERMATOLOGY | Facility: CLINIC | Age: 57
End: 2022-07-05
Payer: COMMERCIAL

## 2022-07-05 DIAGNOSIS — L65.9 LOSS OF HAIR: Primary | ICD-10-CM

## 2022-07-05 NOTE — TELEPHONE ENCOUNTER
Zuri Bender   7/5/2022  8:30 AM CDT Back to Top        Spoke with patient and discussed results. Patient takes 25 mcg daily of Vitamin D.         Zuri Bender on 7/5/2022 at 8:30 AM    Zuri Bender   7/5/2022  8:25 AM CDT         LM for patient to return call.         Zuri Bender on 7/5/2022 at 8:25 AM    Lexie Nieto LPN   6/30/2022  3:19 PM CDT         Left message for patient to call [facility] back at [phone]     JOSE DAVID Rodrigues MD   6/30/2022  3:01 PM CDT         Low normal vitamin D  Please ask her how much she is taking daily     Component Ref Range & Units 7 d ago    Vitamin D, Total (25-Hydroxy) 20 - 75 ug/L 24    Resulting Agency  [facility]           Zuri Bender on 7/5/2022 at 8:31 AM

## 2022-07-07 NOTE — TELEPHONE ENCOUNTER
Patient returns call to the clinic.  Discussed with patient that Dr. Naqvi recommended patient take 2000 international unit(s) of Vitamin D daily for 4 months.  Patient had previously stated that she was taking 1000 international unit(s) or 25 mcg of Vitamin D.  Upon discussing this with patient she seems unsure what dose of vitamin D she has been taking.  I asked patient to retrieve the bottle to verify dose.  She states that she is taking a multivitamin and did not see vitamin D on the bottle.     Patient is requesting that Dr. Naqvi send a prescription of Vitamin D to her pharmacy.

## 2022-07-07 NOTE — TELEPHONE ENCOUNTER
Ok, for me to send the Vit D to the pharmacy for the patient?  She is requesting Rx for it.  Typically OTC unless 50,000 units I believe? Thanks.  You can route this back to me to take care of thanks  Savanna Crowe RN

## 2022-07-07 NOTE — TELEPHONE ENCOUNTER
Correction to prior note.  CAll to patient to advise of provider recommendations  Savanna Crowe RN

## 2022-07-07 NOTE — TELEPHONE ENCOUNTER
Have patient start 2000IUof vitamin daily for the next 4 months.     Jaleesa Naqvi MD    Department of Dermatology  [facility]: Phone: [phone], Fax:[phone]  [facility]: Phone: [phone], Fax: [phone]

## 2022-07-12 RX ORDER — VITAMIN B COMPLEX
TABLET ORAL
Qty: 90 TABLET | Refills: 0 | Status: SHIPPED | OUTPATIENT
Start: 2022-07-12 | End: 2024-02-20

## 2022-08-19 ENCOUNTER — OFFICE VISIT (OUTPATIENT)
Dept: DERMATOLOGY | Facility: CLINIC | Age: 57
End: 2022-08-19
Payer: COMMERCIAL

## 2022-08-19 DIAGNOSIS — L66.10 LICHEN PLANOPILARIS: ICD-10-CM

## 2022-08-19 DIAGNOSIS — L65.9 ALOPECIA: Primary | ICD-10-CM

## 2022-08-19 PROCEDURE — 99213 OFFICE O/P EST LOW 20 MIN: CPT | Mod: 25 | Performed by: DERMATOLOGY

## 2022-08-19 PROCEDURE — 11901 INJECT SKIN LESIONS >7: CPT | Performed by: DERMATOLOGY

## 2022-08-19 RX ORDER — DOXYCYCLINE 100 MG/1
100 CAPSULE ORAL 2 TIMES DAILY
Qty: 180 CAPSULE | Refills: 0 | Status: SHIPPED | OUTPATIENT
Start: 2022-08-19 | End: 2022-08-19

## 2022-08-19 RX ORDER — DOXYCYCLINE 100 MG/1
100 CAPSULE ORAL 2 TIMES DAILY
Qty: 180 CAPSULE | Refills: 1 | Status: SHIPPED | OUTPATIENT
Start: 2022-08-19 | End: 2023-02-17

## 2022-08-19 RX ORDER — VITAMIN B COMPLEX
TABLET ORAL
Qty: 90 TABLET | Refills: 0 | Status: SHIPPED | OUTPATIENT
Start: 2022-08-19 | End: 2024-02-20

## 2022-08-19 NOTE — LETTER
8/19/2022         RE: Rimma Sarmiento  [address]  [place] [address]        Dear Colleague,    Thank you for referring your patient, Rimma Sarmiento, to the [facility]. Please see a copy of my visit note below.    [facility] Dermatology Note  Encounter Date: Aug 19, 2022  Office Visit     Dermatology Problem List:  1. 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 Counseled patient on the etiology of each condition. Discussed treatment options including ILK injections, doxycycline, and topical medications.  - Continue OTC minoxidil 5% foam or solution once daily   - Continue fluocinolone oil three times weekly   - Continue doxycycline 100 mg BID.   - Recommend Head & Shoulder Royal Oil shampoo  - For alopecia, will continue with vitamin D 2000IU      Procedures Performed:   - Intra-lesional triamcinolone procedure note. After verbal consent and review of risk of pain and skin thinning/atrophy, positioning and cleansing with isopropyl alcohol, 3 total mL of triamcinolone 2.5 mg/mL was injected into 40 lesion(s) in a grid-like pattern on the scalp. The patient tolerated the procedure well and left the dermatology clinic in good condition.    Follow-up: 12week(s) in-person, or earlier for new or changing lesions    Staff and Scribe:     Scribe Disclosure:   I, Yoseph Joe, am serving as a scribe to document services personally performed by this physician, Dr. Jaleesa Naqvi, based on data collection and the provider's statements to me.       Provider Disclosure:   The documentation recorded by the scribe accurately reflects the services I personally performed and the decisions made by me.    Jaleesa Naqvi MD  Assistant    Department of Dermatology  [facility]: Phone: [phone], Fax:[phone]  [facility]: Phone: [phone], Fax: [phone]      ____________________________________________    CC: Hair Loss (Stable to improved)    HPI:  Ms. Rimma Sarmiento is a(n) 57 year old female who presents today as a return patient for hair loss.    Last seen 6/21/22 for hair loss. At that time, intra-lesional kenelog was injected into 40 sites on the mid and frontal scalp.    Today, she notes that she doesn't have any stomach upset or sunburns while on medications. She notes her hair has been regrowing.     Patient is otherwise feeling well, without additional skin concerns.    Labs Reviewed:  Component      Latest Ref Rng & Units 6/28/2022   WBC      4.0 - 11.0 10e3/uL 9.9   RBC Count      3.80 - 5.20 10e6/uL 4.89   Hemoglobin      11.7 - 15.7 g/dL 12.7   Hematocrit      35.0 - 47.0 % 39.3   MCV      78 - 100 fL 80   MCH      26.5 - 33.0 pg 26.0 (L)   MCHC      31.5 - 36.5 g/dL 32.3   RDW      10.0 - 15.0 % 13.6   Platelet Count      150 - 450 10e3/uL 360   % Neutrophils      % 70   % Lymphocytes      % 22   % Monocytes      % 6   % Eosinophils      % 1   % Basophils      % 1   % Immature Granulocytes      % 0   NRBCs per 100 WBC      <1 /100 0   Absolute Neutrophils      1.6 - 8.3 10e3/uL 6.9   Absolute Lymphocytes      0.8 - 5.3 10e3/uL 2.2   Absolute Monocytes      0.0 - 1.3 10e3/uL 0.6   Absolute Eosinophils      0.0 - 0.7 10e3/uL 0.1   Absolute Basophils      0.0 - 0.2 10e3/uL 0.1   Absolute Immature Granulocytes      <=0.4 10e3/uL 0.0   Absolute NRBCs      10e3/uL 0.0   TSH      0.40 - 4.00 mU/L 1.11   Zinc      60.0 - 120.0 ug/dL 64.7   Vitamin D Deficiency screening      20 - 75 ug/L 24   Ferritin      8 - 252 ng/mL 187     Physical Exam:  Vitals: There were no vitals taken for this visit.  SKIN: Focused  examination of the scalp was performed.  - Buccal mucosa of the mouth look normal.  - Hair regrowth along scalp  - Eyebrows appear normal.  - Consistent hair loss on frontal scalp  - No other lesions of concern on areas examined.     Medications:  Current Outpatient Medications   Medication     aspirin 81 MG EC tablet     atorvastatin (LIPITOR) 80 MG tablet     blood glucose (NO BRAND SPECIFIED) test strip     Blood Glucose Monitoring Suppl (FIFTY50 GLUCOSE METER 2.0) w/Device KIT     carvedilol (COREG) 25 MG tablet     clindamycin (CLINDAMAX) 1 % external gel     clopidogrel (PLAVIX) 75 MG tablet     doxycycline monohydrate (MONODOX) 100 MG capsule     escitalopram (LEXAPRO) 10 MG tablet     Fluocinolone Acetonide Scalp (DERMA-SMOOTHE/FS SCALP) 0.01 % OIL oil     hydrochlorothiazide (HYDRODIURIL) 25 MG tablet     hydrOXYzine (ATARAX) 25 MG tablet     ibuprofen (ADVIL/MOTRIN) 200 MG tablet     insulin aspart (NOVOLOG FLEXPEN) 100 UNIT/ML pen     insulin aspart (NOVOLOG VIAL) 100 UNITS/ML vial     insulin glargine (LANTUS PEN) 100 UNIT/ML pen     insulin glargine (LANTUS PEN) 100 UNIT/ML pen     losartan (COZAAR) 100 MG tablet     nicotine (NICOTROL) 10 MG inhaler     pantoprazole (PROTONIX) 40 MG EC tablet     Vitamin D3 (CHOLECALCIFEROL) 25 mcg (1000 units) tablet     zolpidem (AMBIEN) 5 MG tablet     No current facility-administered medications for this visit.      Past Medical History:   Patient Active Problem List   Diagnosis     Alopecia     Anomalous optic nerve (H)     Anxiety     Blurring of visual image     Decreased peripheral vision     Gastroesophageal reflux disease     Homonymous hemianopsia due to old cerebral infarction     Mixed hyperlipidemia     Essential hypertension     Hypertensive urgency     Impingement syndrome of right shoulder     Microalbuminuria     Morbid obesity (H)     Morbid obesity with BMI of 40.0-44.9, adult (H)     Nontraumatic complete tear of right rotator cuff     SUSIE  (obstructive sleep apnea)     Other acute postprocedural pain     Pain of lower extremity     Recurrent major depressive episodes (H)     Routine general medical examination at a health care facility     Tobacco use disorder     Type 2 diabetes mellitus with hyperglycemia, with long-term current use of insulin (H)     Undersocialized conduct disorder, aggressive type     Ventricular tachycardia, non-sustained (H)     Vitamin D deficiency     History of colonic polyps     History of electrophysiologic study for VT which was not found on 9/20/2019     History of CVA (cerebrovascular accident)     Chronic diastolic heart failure (H)     Tobacco abuse counseling     Fatigue, unspecified type     Cerebral infarction (H)     Cerebrovascular accident (CVA) due to bilateral embolism of posterior cerebral arteries (H)     CKD stage G2/A3, GFR 60-89 and albumin creatinine ratio >300 mg/g     Combined forms of age-related cataract of both eyes     Dry eyes, bilateral     Myopia of both eyes with astigmatism and presbyopia     Sensorineural hearing loss, bilateral     Past Medical History:   Diagnosis Date     Anxiety      Depression      DM (diabetes mellitus) (H)      High cholesterol 09/17/2019     Hypertension      Sleep apnea      Stroke (H)      Type 2 diabetes mellitus (H) 10/23/2008        CC No referring provider defined for this encounter. on close of this encounter.      Again, thank you for allowing me to participate in the care of your patient.        Sincerely,        Jaleesa Naqvi MD

## 2022-08-19 NOTE — NURSING NOTE
Rimma Sarmiento's goals for this visit include:   Chief Complaint   Patient presents with     Hair Loss     Stable to improved     She requests these members of her care team be copied on today's visit information:     PCP: Melida Turner    Referring Provider:  No referring provider defined for this encounter.    There were no vitals taken for this visit.    Do you need any medication refills at today's visit? No  Lilliam Villafuerte, SHIRA on 8/19/2022 at 10:08 AM

## 2022-08-19 NOTE — PATIENT INSTRUCTIONS
Intralesional Kenalog (ILK) Injections    Intralesional steroid injection involves a corticosteroid, such as triamcinolone acetonide (brand name Kenalog), which is injected directly into a lesion on or immediately below the skin. Intralesional kenalog may be used to treat the following skin conditions:    Alopecia areata  Discoid lupus erythematosus  Keloids/hypertrophic scars  Granuloma annulare and other granulomatous disorders  Hypertrophic lichen planus  Lichen simplex chronicus (neurodermatitis)  Localised psoriasis  Necrobiosis lipoidica  Acne cysts (nodulocystic acne)  Small infantile hemangiomas    Possible side-effects of intralesional Kenalog (ILK) injections include bleeding, pain, infection, contact dermatitis, nerve damage, scar formation and need for a repeat procedure.    Some people may experience delayed side-effects including:  Lipoatrophy, appearing as skin indentations or dimples around the injection sites a few weeks after treatment; these may be permanent.  White marks (leukoderma) or brown marks (postinflammatory pigmentation) at the site of injection or spreading from the site of injection - these may resolve or persist long term.  Telangiectasia, or small dilated blood vessels at the site of injection.   Increased hair growth at the site of injection - this resolves eventually.  Steroid acne: steroids increase growth hormone, leading to increased sebum (oil) production by the sebaceous glands. Steroid acne generally improves once the steroid has been stopped.      Who should I call with questions?  [facility]: [phone]   [facility]: [phone]  For urgent needs outside of business hours call the [facility] at [phone] and ask for the dermatology resident on call

## 2022-09-06 NOTE — NURSING NOTE
Clinic Administered Medication Documentation    Administrations This Visit     triamcinolone acetonide (KENALOG-10) injection 7.5 mg     Admin Date  08/19/2022 Action  Given Dose  7.5 mg Route  Intra-Lesional Site   Administered By  Lilliam Villafuerte CMA    Ordering Provider: Jaleesa Naqvi MD    NDC: 7414-7399-97    Lot#: [id number]    : [employer] (PRIMARY CARE)    Patient Supplied?: No    Comments: late entry              Late entry    Lilliam Villafuerte CMA

## 2022-12-08 NOTE — NURSING NOTE
Clinic Administered Medication Documentation    Administrations This Visit     triamcinolone acetonide (KENALOG-10) injection 5 mg     Admin Date  06/21/2022 Action  Given Dose  5 mg Route  Intra-Lesional Site   Administered By  Lexie Nieto LPN    Ordering Provider: Jaleesa Naqvi MD    NDC: 2913-4240-76    Lot#: [id number]    : [employer] (PRIMARY CARE)    Patient Supplied?: No                Lexie Nieto LPN      
Rimma Sarmiento's goals for this visit include:   Chief Complaint   Patient presents with     Hair Loss     Using fluocinonide with improvement. Patient states [facility] is out of budget.       She requests these members of her care team be copied on today's visit information:     PCP: Melida Turner    Referring Provider:  Melida Turner MD  [address] [facility] [place] [address]    There were no vitals taken for this visit.    Do you need any medication refills at today's visit? Stacy Nieto LPN      
dr. mitchell
Lizzette Pink MD

## 2023-01-25 ENCOUNTER — TELEPHONE (OUTPATIENT)
Dept: DERMATOLOGY | Facility: CLINIC | Age: 58
End: 2023-01-25
Payer: COMMERCIAL

## 2023-01-25 DIAGNOSIS — E55.9 HYPOVITAMINOSIS D: Primary | ICD-10-CM

## 2023-01-25 DIAGNOSIS — L65.9 ALOPECIA: ICD-10-CM

## 2023-01-31 NOTE — TELEPHONE ENCOUNTER
Vitamin D3 (CHOLECALCIFEROL) 25 mcg (1000 units) tablet      Last Written Prescription Date:  8/19/2022  Last Fill Quantity: 90,   # refills: 0  Last Office Visit : 8/19/2022  [place]  Future Office visit:  2/17/2023    Routing refill request to on-call Dermatology provider for review/approval because:  Refill needs review- dose.  - send refill for 4,000 units or 2,000 units QD?  - refill requested as last ordered 8/19/2022 as take 4 tabs QD (4,000 units)  - plan last visit 8/19/2022: - For alopecia, will continue with vitamin D 2000IU   - there was an order sent 7/12/2022 take 2 tabs (2,000 units) QD for 3 months  *pended Rx as requested

## 2023-02-02 NOTE — TELEPHONE ENCOUNTER
Please let this patietn know we will recheck vitamin D at follow up in 2 weeks to decide on dosing

## 2023-02-03 RX ORDER — VITAMIN B COMPLEX
4 TABLET ORAL DAILY
Qty: 120 TABLET | Refills: 0 | OUTPATIENT
Start: 2023-02-03

## 2023-02-08 NOTE — ADDENDUM NOTE
Addended by: SALOMÓN AGUSTIN on: 2/8/2023 04:56 PM     Modules accepted: Orders     Start Augmentin.     Continue oral hydration.     Consider Tylenol / Ibuprofen as needed for aches/pains or fevers.       FYI for sinus congestion:   Viral rhinosinusitis is complicated by acute bacterial infection in only 0.5 to 2.0 percent of episodes, typically clears in 10 days, but may last up to 12 weeks. Acute sinusitis is inflammation that lasts for less than four weeks while chronic sinusitis lasts for more than 12weeks.     1. Use a high volume Neti Pot/sinus flush ([facility] Med Sinus Rinse) - with Distilled Water - to irrigate sinuses/mucosal tissue.     -- Use warm distilled water and 2 packs of thesalt solution that comes with the bottle, dissolve in bottle up to 240 mL Nicolas.  -- Irrigate each side of the nose bending over the sink, using 1/3- to 1/2 the volume of the bottle in each nostril every irrigation.       -- Irrigate 5 times daily and as needed.    -- Start nasal steroids (Nasonex, Flonase, etc.) -- Available over-the-counter, or with prescription. (Administer nasal steroid spray, after performing the saline nasal irrigation)    2. Tylenol or ibuprofen for pain and fevers as needed.     3. If you do not tolerate nasal sprays suchas Nasonex or Flonase, then you may use oral decongestant.  These medications work to shrink swollen tissue and reduce sinus pressure.      If you choose Sudafed (pseudoephedrine) -- use for only 5-7 days AS DIRECTED.    -- Speak to your pharmacist if you wish to start oral decongestant.   -- Sudafed (pseudoephedrine) can cause elevation of blood pressure.     -- May also use decongesting nasal spray -- briefly -- such as oxymetazoline (Afrin) -- administer 2 sprays into both nostrils, nightly for 3-4 days, ( - Or - twice daily for 3 days MAXIMUM) then STOP -- as can cause rebound congestion    4. Plenty of fluids and rest as needed.     You will need to be evaluated if you start toexperience:   Fever higher than 102.5 F (39.2 C)   Sudden and severe pain in the face and  head   Trouble seeing or seeing double   Trouble thinking clearly   Swelling or redness around 1 or both eyes   Trouble breathing or astiff neck    * If you are a smoker, try to quit *    -- Okay to use Claritin/Allegra/Zyrtec (without -D), can help for sneezing, itchy eyes, etc.

## 2023-02-08 NOTE — TELEPHONE ENCOUNTER
Third attempt to reach pt, no answer. Left message for her to return our call at [phone]. Closing encounter.    Iad Villasenor RN on 2/8/2023 at 8:25 AM

## 2023-02-17 ENCOUNTER — OFFICE VISIT (OUTPATIENT)
Dept: DERMATOLOGY | Facility: CLINIC | Age: 58
End: 2023-02-17
Payer: COMMERCIAL

## 2023-02-17 ENCOUNTER — LAB (OUTPATIENT)
Dept: LAB | Facility: CLINIC | Age: 58
End: 2023-02-17
Payer: COMMERCIAL

## 2023-02-17 DIAGNOSIS — L66.10 LICHEN PLANOPILARIS: ICD-10-CM

## 2023-02-17 DIAGNOSIS — L65.9 ALOPECIA: ICD-10-CM

## 2023-02-17 DIAGNOSIS — E55.9 HYPOVITAMINOSIS D: ICD-10-CM

## 2023-02-17 PROCEDURE — 36415 COLL VENOUS BLD VENIPUNCTURE: CPT

## 2023-02-17 PROCEDURE — 11901 INJECT SKIN LESIONS >7: CPT | Performed by: DERMATOLOGY

## 2023-02-17 PROCEDURE — 82306 VITAMIN D 25 HYDROXY: CPT

## 2023-02-17 RX ORDER — DOXYCYCLINE 100 MG/1
100 CAPSULE ORAL 2 TIMES DAILY
Qty: 180 CAPSULE | Refills: 1 | Status: SHIPPED | OUTPATIENT
Start: 2023-02-17 | End: 2023-02-22

## 2023-02-17 ASSESSMENT — PAIN SCALES - GENERAL: PAINLEVEL: NO PAIN (0)

## 2023-02-17 NOTE — LETTER
2/17/2023         RE: Rimma Sarmiento  [address] [place] [address]        Dear Colleague,    Thank you for referring your patient, Rimma Sarmiento, to the [facility]. Please see a copy of my visit note below.    [facility] Dermatology Note  Encounter Date: Feb 17, 2023  Office Visit     Dermatology Problem List:  1. 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 Improved  - Continue OTC minoxidil 5% foam or solution once daily   - Continue fluocinolone oil three times weekly   - Continue doxycycline 100 mg BID.   - Recommend Head & Shoulder Royal Oil shampoo  - For alopecia, will continue with vitamin D 2000IU         Procedures Performed:   Kenalog intralesional injection procedure note: After verbal consent and discussion of risks including but not limited to atrophy, pain, and bruising, time out was performed, the patient underwent positioning and the area was prepped with isopropyl alcohol, 3 total cc of Kenalog 2.5 mg/cc was injected into frontal and crown sites on the scalp in a grid at approximately 60 injection points.  The patient tolerated the procedure well and left the Dermatology clinic in good condition.        Follow-up: within 12 weeks, injections     Staff and Scribe:     Scribe Disclosure:   I, Yoseph Joe, am serving as a scribe to document services personally performed by this physician, Dr. Jaleesa Naqvi, based on data collection and the provider's statements to me.       Provider Disclosure:   The documentation recorded by the scribe accurately reflects the services I personally performed and the decisions made by me.    Jaleesa Naqvi MD    Department of Dermatology  [facility]  [facility]: Phone: [phone], Fax:[phone]  [facility]: Phone: [phone], Fax: [phone]    ____________________________________________    CC: Hair Loss (ILK- 3 ml of 2.5 kenalog. /Patient has noticed a little bit of growth. Patient forgot to  the doxycyline but went to the hospital for pneumonia in November 2022 and they gave her doxycycline to treat it so her spots on face went away but she did not use the dose that we prescribed to her. )    HPI:  Ms. Rimma Sarmiento is a(n) 57 year old female who presents today as a return patient for hair loss    Last seen 8/19/22 for hair loss. At that time, patient received Kenalog injections and was instructed to continue minoxidil 5% foam/solution daily, continue fluocinolone oil three times weekly, and continue doxycycline 100 mg daily.     Today, she never started doxycycline. Has had hair growth    Patient is otherwise feeling well, without additional skin concerns.    Labs Reviewed:  N/A    Physical Exam:  Vitals: There were no vitals taken for this visit.  SKIN:  -regrwoth posterior scalp, frontal hairline  Hair style tight  -loss of ostia on crown  No pustules or scale  - No other lesions of concern on areas examined.     Medications:  Current Outpatient Medications   Medication     aspirin 81 MG EC tablet     atorvastatin (LIPITOR) 80 MG tablet     blood glucose (NO BRAND SPECIFIED) test strip     Blood Glucose Monitoring Suppl (FIFTY50 GLUCOSE METER 2.0) w/Device KIT     carvedilol (COREG) 25 MG tablet     clindamycin (CLINDAMAX) 1 % external gel     clopidogrel (PLAVIX) 75 MG tablet     doxycycline monohydrate (MONODOX) 100 MG capsule     escitalopram (LEXAPRO) 10 MG tablet     Fluocinolone Acetonide Scalp (DERMA-SMOOTHE/FS SCALP) 0.01 % OIL oil     hydrochlorothiazide (HYDRODIURIL) 25 MG tablet     hydrOXYzine (ATARAX) 25 MG tablet     ibuprofen  (ADVIL/MOTRIN) 200 MG tablet     insulin aspart (NOVOLOG FLEXPEN) 100 UNIT/ML pen     insulin aspart (NOVOLOG VIAL) 100 UNITS/ML vial     insulin glargine (LANTUS PEN) 100 UNIT/ML pen     insulin glargine (LANTUS PEN) 100 UNIT/ML pen     losartan (COZAAR) 100 MG tablet     nicotine (NICOTROL) 10 MG inhaler     pantoprazole (PROTONIX) 40 MG EC tablet     Vitamin D3 (CHOLECALCIFEROL) 25 mcg (1000 units) tablet     Vitamin D3 (CHOLECALCIFEROL) 25 mcg (1000 units) tablet     zolpidem (AMBIEN) 5 MG tablet     No current facility-administered medications for this visit.      Past Medical History:   Patient Active Problem List   Diagnosis     Alopecia     Anomalous optic nerve (H)     Anxiety     Blurring of visual image     Decreased peripheral vision     Gastroesophageal reflux disease     Homonymous hemianopsia due to old cerebral infarction     Mixed hyperlipidemia     Essential hypertension     Hypertensive urgency     Impingement syndrome of right shoulder     Microalbuminuria     Morbid obesity (H)     Morbid obesity with BMI of 40.0-44.9, adult (H)     Nontraumatic complete tear of right rotator cuff     SUSIE (obstructive sleep apnea)     Other acute postprocedural pain     Pain of lower extremity     Recurrent major depressive episodes (H)     Routine general medical examination at a health care facility     Tobacco use disorder     Type 2 diabetes mellitus with hyperglycemia, with long-term current use of insulin (H)     Undersocialized conduct disorder, aggressive type     Ventricular tachycardia, non-sustained     Vitamin D deficiency     History of colonic polyps     History of electrophysiologic study for VT which was not found on 9/20/2019     History of CVA (cerebrovascular accident)     Chronic diastolic heart failure (H)     Tobacco abuse counseling     Fatigue, unspecified type     Cerebral infarction (H)     Cerebrovascular accident (CVA) due to bilateral embolism of posterior cerebral arteries (H)      CKD stage G2/A3, GFR 60-89 and albumin creatinine ratio >300 mg/g     Combined forms of age-related cataract of both eyes     Dry eyes, bilateral     Myopia of both eyes with astigmatism and presbyopia     Sensorineural hearing loss, bilateral     Past Medical History:   Diagnosis Date     Anxiety      Depression      DM (diabetes mellitus) (H)      High cholesterol 09/17/2019     Hypertension      Sleep apnea      Stroke (H)      Type 2 diabetes mellitus (H) 10/23/2008        CC No referring provider defined for this encounter. on close of this encounter.       Again, thank you for allowing me to participate in the care of your patient.        Sincerely,        Jaleesa Naqvi MD

## 2023-02-17 NOTE — PROGRESS NOTES
[facility] Dermatology Note  Encounter Date: Feb 17, 2023  Office Visit     Dermatology Problem List:  1. 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 Improved  - Continue OTC minoxidil 5% foam or solution once daily   - Continue fluocinolone oil three times weekly   - Continue doxycycline 100 mg BID.   - Recommend Head & Shoulder Royal Oil shampoo  - For alopecia, will continue with vitamin D 2000IU         Procedures Performed:   Kenalog intralesional injection procedure note: After verbal consent and discussion of risks including but not limited to atrophy, pain, and bruising, time out was performed, the patient underwent positioning and the area was prepped with isopropyl alcohol, 3 total cc of Kenalog 2.5 mg/cc was injected into frontal and crown sites on the scalp in a grid at approximately 60 injection points.  The patient tolerated the procedure well and left the Dermatology clinic in good condition.        Follow-up: within 12 weeks, injections     Staff and Scribe:     Scribe Disclosure:   I, Yoseph Joe, am serving as a scribe to document services personally performed by this physician, Dr. Jaleesa Naqvi, based on data collection and the provider's statements to me.       Provider Disclosure:   The documentation recorded by the scribe accurately reflects the services I personally performed and the decisions made by me.    Jaleesa Naqvi MD    Department of Dermatology  [facility]: Phone: [phone], Fax:[phone]  [facility]: Phone: [phone], Fax: [phone]    ____________________________________________    CC: Hair Loss (ILK- 3 ml of  2.5 kenalog. /Patient has noticed a little bit of growth. Patient forgot to  the doxycyline but went to the hospital for pneumonia in November 2022 and they gave her doxycycline to treat it so her spots on face went away but she did not use the dose that we prescribed to her. )    HPI:  Ms. Rimma Sarmiento is a(n) 57 year old female who presents today as a return patient for hair loss    Last seen 8/19/22 for hair loss. At that time, patient received Kenalog injections and was instructed to continue minoxidil 5% foam/solution daily, continue fluocinolone oil three times weekly, and continue doxycycline 100 mg daily.     Today, she never started doxycycline. Has had hair growth    Patient is otherwise feeling well, without additional skin concerns.    Labs Reviewed:  N/A    Physical Exam:  Vitals: There were no vitals taken for this visit.  SKIN:  -regrwoth posterior scalp, frontal hairline  Hair style tight  -loss of ostia on crown  No pustules or scale  - No other lesions of concern on areas examined.     Medications:  Current Outpatient Medications   Medication     aspirin 81 MG EC tablet     atorvastatin (LIPITOR) 80 MG tablet     blood glucose (NO BRAND SPECIFIED) test strip     Blood Glucose Monitoring Suppl (FIFTY50 GLUCOSE METER 2.0) w/Device KIT     carvedilol (COREG) 25 MG tablet     clindamycin (CLINDAMAX) 1 % external gel     clopidogrel (PLAVIX) 75 MG tablet     doxycycline monohydrate (MONODOX) 100 MG capsule     escitalopram (LEXAPRO) 10 MG tablet     Fluocinolone Acetonide Scalp (DERMA-SMOOTHE/FS SCALP) 0.01 % OIL oil     hydrochlorothiazide (HYDRODIURIL) 25 MG tablet     hydrOXYzine (ATARAX) 25 MG tablet     ibuprofen (ADVIL/MOTRIN) 200 MG tablet     insulin aspart (NOVOLOG FLEXPEN) 100 UNIT/ML pen     insulin aspart (NOVOLOG VIAL) 100 UNITS/ML vial     insulin glargine (LANTUS PEN) 100 UNIT/ML pen     insulin glargine (LANTUS PEN) 100 UNIT/ML pen     losartan (COZAAR) 100 MG tablet      nicotine (NICOTROL) 10 MG inhaler     pantoprazole (PROTONIX) 40 MG EC tablet     Vitamin D3 (CHOLECALCIFEROL) 25 mcg (1000 units) tablet     Vitamin D3 (CHOLECALCIFEROL) 25 mcg (1000 units) tablet     zolpidem (AMBIEN) 5 MG tablet     No current facility-administered medications for this visit.      Past Medical History:   Patient Active Problem List   Diagnosis     Alopecia     Anomalous optic nerve (H)     Anxiety     Blurring of visual image     Decreased peripheral vision     Gastroesophageal reflux disease     Homonymous hemianopsia due to old cerebral infarction     Mixed hyperlipidemia     Essential hypertension     Hypertensive urgency     Impingement syndrome of right shoulder     Microalbuminuria     Morbid obesity (H)     Morbid obesity with BMI of 40.0-44.9, adult (H)     Nontraumatic complete tear of right rotator cuff     SUSIE (obstructive sleep apnea)     Other acute postprocedural pain     Pain of lower extremity     Recurrent major depressive episodes (H)     Routine general medical examination at a health care facility     Tobacco use disorder     Type 2 diabetes mellitus with hyperglycemia, with long-term current use of insulin (H)     Undersocialized conduct disorder, aggressive type     Ventricular tachycardia, non-sustained     Vitamin D deficiency     History of colonic polyps     History of electrophysiologic study for VT which was not found on 9/20/2019     History of CVA (cerebrovascular accident)     Chronic diastolic heart failure (H)     Tobacco abuse counseling     Fatigue, unspecified type     Cerebral infarction (H)     Cerebrovascular accident (CVA) due to bilateral embolism of posterior cerebral arteries (H)     CKD stage G2/A3, GFR 60-89 and albumin creatinine ratio >300 mg/g     Combined forms of age-related cataract of both eyes     Dry eyes, bilateral     Myopia of both eyes with astigmatism and presbyopia     Sensorineural hearing loss, bilateral     Past Medical History:    Diagnosis Date     Anxiety      Depression      DM (diabetes mellitus) (H)      High cholesterol 09/17/2019     Hypertension      Sleep apnea      Stroke (H)      Type 2 diabetes mellitus (H) 10/23/2008        CC No referring provider defined for this encounter. on close of this encounter.

## 2023-02-18 LAB — DEPRECATED CALCIDIOL+CALCIFEROL SERPL-MC: 27 UG/L (ref 20–75)

## 2023-02-20 ENCOUNTER — TELEPHONE (OUTPATIENT)
Dept: DERMATOLOGY | Facility: CLINIC | Age: 58
End: 2023-02-20

## 2023-02-20 ENCOUNTER — TELEPHONE (OUTPATIENT)
Dept: DERMATOLOGY | Facility: CLINIC | Age: 58
End: 2023-02-20
Payer: COMMERCIAL

## 2023-02-20 ENCOUNTER — MYC MEDICAL ADVICE (OUTPATIENT)
Dept: DERMATOLOGY | Facility: CLINIC | Age: 58
End: 2023-02-20
Payer: COMMERCIAL

## 2023-02-20 NOTE — TELEPHONE ENCOUNTER
Pt called the clinic back and left a voicemail returning our call about getting an appointment scheduled. Pt already scheduled in May. [email or web address] sent notifying pt of this appointment.    Ida Villasenor RN on 2/20/2023 at 2:08 PM

## 2023-02-20 NOTE — TELEPHONE ENCOUNTER
Called patient and left message in regards to getting her scheduled for an upcoming appointment.     Driss is double booked on all of her potential spots in May. So I have tentatively scheduled her with  Merlyn eLe on May 10th, 2023 at 1:30pm for ILK injections.       Lydia Major LPN

## 2023-02-22 ENCOUNTER — TELEPHONE (OUTPATIENT)
Dept: DERMATOLOGY | Facility: CLINIC | Age: 58
End: 2023-02-22
Payer: COMMERCIAL

## 2023-02-22 DIAGNOSIS — L66.10 LICHEN PLANOPILARIS: ICD-10-CM

## 2023-02-22 RX ORDER — DOXYCYCLINE 100 MG/1
100 CAPSULE ORAL 2 TIMES DAILY
Qty: 180 CAPSULE | Refills: 1 | Status: SHIPPED | OUTPATIENT
Start: 2023-02-22 | End: 2024-02-20

## 2023-02-22 NOTE — TELEPHONE ENCOUNTER
Spoke with [facility] representative. Patient no longer has an active account for [facility] pharmacy mail service. Patient will need to call her insurance company to see what her plan covers.     Anastasiya MODI

## 2023-02-22 NOTE — TELEPHONE ENCOUNTER
[facility] Call Center    Phone Message    May a detailed message be left on voicemail: yes     Reason for Call: Other: Patient wants to know if all of her records and meds were transferred to [facility] as she requested at her last appt.     Action Taken: Message routed to:  Adult Clinics: Dermatology p [id number]    Travel Screening: Not Applicable

## 2023-02-22 NOTE — TELEPHONE ENCOUNTER
Pt called the clinic back with questions about her doxycycline prescription. Writer sent over prescription to [facility] in [facility] in [facility].    Ida Villasenor RN on 2/22/2023 at 10:28 AM

## 2023-02-22 NOTE — TELEPHONE ENCOUNTER
Pt attempted to call [facility] pharmacy to have them cancel prescription as pt wanted it sent to a different pharmacy. Attempted to call twice but was hung up on each time.    Ida Villasenor RN on 2/22/2023 at 10:32 AM

## 2023-02-22 NOTE — TELEPHONE ENCOUNTER
Call routed from call center. Patient is inquiring on the status of her doxycycline. Informed patient that it was sent to [facility] mail service on 2/17/23. Will try to reach out to check on status.     Anastasiya MODI

## 2023-02-24 ENCOUNTER — NURSE TRIAGE (OUTPATIENT)
Dept: NURSING | Facility: CLINIC | Age: 58
End: 2023-02-24
Payer: COMMERCIAL

## 2023-02-24 NOTE — TELEPHONE ENCOUNTER
"Triage Call:     Pt moved from [facility] to [place] and is needing to set up a new PCP. She usually sees someone at [facility]    Pt has been having \"dizzy spells\" and needs to sit before she can do anything else  Always happens in the AM  Thinks she moves to fast  Sometimes it goes away right away     Shoulder pain on left side and she takes   Diclofenac cream for her arm and it is not helping; can barely lift her arm because of the pain.   Right side was disconnected; surgery was needed and she think    Pt has a hx of stroke and diabetes. Does not have a way to check blood glucose as her machine is packed up from the move.     Pt reports that she feels like the room in \"tilting\" while on the phone with writer. She was advised to be seen in the ED nearby. Pt agrees with this plan but also wanted to be transferred to scheduling to make an appt.     Reason for Disposition    Spinning or tilting sensation (vertigo) present now and one or more stroke risk factors (i.e., hypertension, diabetes mellitus, prior stroke/TIA, heart attack, age over 60) (Exception: prior physician evaluation for this AND no different/worse than usual)    Additional Information    Negative: SEVERE difficulty breathing (e.g., struggling for each breath, speaks in single words)    Negative: Shock suspected (e.g., cold/pale/clammy skin, too weak to stand, low BP, rapid pulse)    Negative: Difficult to awaken or acting confused (e.g., disoriented, slurred speech)    Negative: Fainted, and still feels dizzy afterwards    Negative: Overdose (accidental or intentional) of medications    Negative: New neurologic deficit that is present now: * Weakness of the face, arm, or leg on one side of the body * Numbness of the face, arm, or leg on one side of the body * Loss of speech or garbled speech    Negative: Heart beating < 50 beats per minute OR > 140 beats per minute    Negative: Sounds like a life-threatening emergency to the triager    " Negative: Rectal bleeding, bloody stool, or tarry-black stool    Negative: Vomiting is main symptom    Negative: Diarrhea is main symptom    Negative: Chest pain    Negative: Headache is main symptom    Negative: Heat exhaustion suspected (i.e., dehydration from heat exposure)    Negative: Patient states that they are having an anxiety or panic attack    Negative: SEVERE dizziness (e.g., unable to stand, requires support to walk, feels like passing out now)    Negative: SEVERE headache or neck pain    Protocols used: DIZZINESS-A-OH    Latia Jimenez RN  [facility] Nurse Advisor 9:54 AM 2/24/2023

## 2023-03-24 ENCOUNTER — OFFICE VISIT (OUTPATIENT)
Dept: FAMILY MEDICINE | Facility: CLINIC | Age: 58
End: 2023-03-24
Payer: COMMERCIAL

## 2023-03-24 VITALS
HEART RATE: 68 BPM | WEIGHT: 220 LBS | SYSTOLIC BLOOD PRESSURE: 132 MMHG | DIASTOLIC BLOOD PRESSURE: 85 MMHG | HEIGHT: 63 IN | BODY MASS INDEX: 38.98 KG/M2 | RESPIRATION RATE: 16 BRPM | TEMPERATURE: 98.3 F

## 2023-03-24 DIAGNOSIS — Z79.4 TYPE 2 DIABETES MELLITUS WITH OTHER SPECIFIED COMPLICATION, WITH LONG-TERM CURRENT USE OF INSULIN (H): Primary | ICD-10-CM

## 2023-03-24 DIAGNOSIS — I10 PRIMARY HYPERTENSION: ICD-10-CM

## 2023-03-24 DIAGNOSIS — E11.69 TYPE 2 DIABETES MELLITUS WITH OTHER SPECIFIED COMPLICATION, WITH LONG-TERM CURRENT USE OF INSULIN (H): Primary | ICD-10-CM

## 2023-03-24 DIAGNOSIS — G47.30 SLEEP APNEA, UNSPECIFIED TYPE: ICD-10-CM

## 2023-03-24 LAB — HBA1C MFR BLD: 7.2 % (ref 0–5.6)

## 2023-03-24 PROCEDURE — 0124A COVID-19 VACCINE BIVALENT BOOSTER 12+ (PFIZER): CPT | Performed by: PHYSICIAN ASSISTANT

## 2023-03-24 PROCEDURE — 82570 ASSAY OF URINE CREATININE: CPT | Performed by: PHYSICIAN ASSISTANT

## 2023-03-24 PROCEDURE — 83036 HEMOGLOBIN GLYCOSYLATED A1C: CPT | Performed by: PHYSICIAN ASSISTANT

## 2023-03-24 PROCEDURE — 99214 OFFICE O/P EST MOD 30 MIN: CPT | Mod: 25 | Performed by: PHYSICIAN ASSISTANT

## 2023-03-24 PROCEDURE — 36415 COLL VENOUS BLD VENIPUNCTURE: CPT | Performed by: PHYSICIAN ASSISTANT

## 2023-03-24 PROCEDURE — 82043 UR ALBUMIN QUANTITATIVE: CPT | Performed by: PHYSICIAN ASSISTANT

## 2023-03-24 PROCEDURE — 80053 COMPREHEN METABOLIC PANEL: CPT | Performed by: PHYSICIAN ASSISTANT

## 2023-03-24 PROCEDURE — 91312 COVID-19 VACCINE BIVALENT BOOSTER 12+ (PFIZER): CPT | Performed by: PHYSICIAN ASSISTANT

## 2023-03-24 ASSESSMENT — PATIENT HEALTH QUESTIONNAIRE - PHQ9
10. IF YOU CHECKED OFF ANY PROBLEMS, HOW DIFFICULT HAVE THESE PROBLEMS MADE IT FOR YOU TO DO YOUR WORK, TAKE CARE OF THINGS AT HOME, OR GET ALONG WITH OTHER PEOPLE: NOT DIFFICULT AT ALL
SUM OF ALL RESPONSES TO PHQ QUESTIONS 1-9: 0
SUM OF ALL RESPONSES TO PHQ QUESTIONS 1-9: 0

## 2023-03-24 ASSESSMENT — ANXIETY QUESTIONNAIRES
8. IF YOU CHECKED OFF ANY PROBLEMS, HOW DIFFICULT HAVE THESE MADE IT FOR YOU TO DO YOUR WORK, TAKE CARE OF THINGS AT HOME, OR GET ALONG WITH OTHER PEOPLE?: SOMEWHAT DIFFICULT
5. BEING SO RESTLESS THAT IT IS HARD TO SIT STILL: SEVERAL DAYS
3. WORRYING TOO MUCH ABOUT DIFFERENT THINGS: SEVERAL DAYS
GAD7 TOTAL SCORE: 6
4. TROUBLE RELAXING: SEVERAL DAYS
1. FEELING NERVOUS, ANXIOUS, OR ON EDGE: SEVERAL DAYS
GAD7 TOTAL SCORE: 6
7. FEELING AFRAID AS IF SOMETHING AWFUL MIGHT HAPPEN: NOT AT ALL
IF YOU CHECKED OFF ANY PROBLEMS ON THIS QUESTIONNAIRE, HOW DIFFICULT HAVE THESE PROBLEMS MADE IT FOR YOU TO DO YOUR WORK, TAKE CARE OF THINGS AT HOME, OR GET ALONG WITH OTHER PEOPLE: SOMEWHAT DIFFICULT
2. NOT BEING ABLE TO STOP OR CONTROL WORRYING: SEVERAL DAYS
7. FEELING AFRAID AS IF SOMETHING AWFUL MIGHT HAPPEN: NOT AT ALL
6. BECOMING EASILY ANNOYED OR IRRITABLE: SEVERAL DAYS
GAD7 TOTAL SCORE: 6

## 2023-03-24 NOTE — PROGRESS NOTES
"  Assessment & Plan     Type 2 diabetes mellitus with other specified complication, with long-term current use of insulin (H)  -A1C not terrible today  -will start Bydureon (did not tolerate Metformin and would not take insulin in the past)  -Side effects of medication(s) discussed as well as risks/benefits of taking   -will recheck A1C in 3 months.    - Hemoglobin A1c; Future  - Comprehensive metabolic panel (BMP + Alb, Alk Phos, ALT, AST, Total. Bili, TP); Future  - Albumin Random Urine Quantitative with Creat Ratio; Future  - Hemoglobin A1c  - Comprehensive metabolic panel (BMP + Alb, Alk Phos, ALT, AST, Total. Bili, TP)  - Albumin Random Urine Quantitative with Creat Ratio  - exenatide ER (BYDUREON BCISE) 2 MG/0.85ML auto-injector; Inject 2 mg Subcutaneous every 7 days    Primary hypertension  -managed well  -meds refilled  -labs today  - Comprehensive metabolic panel (BMP + Alb, Alk Phos, ALT, AST, Total. Bili, TP); Future  - Comprehensive metabolic panel (BMP + Alb, Alk Phos, ALT, AST, Total. Bili, TP)    Sleep apnea, unspecified type  -uses her CPAP      Nicotine/Tobacco Cessation:  She reports that she has been smoking cigarettes. She has been smoking an average of .25 packs per day. She has never used smokeless tobacco.  Nicotine/Tobacco Cessation Plan:   Information offered: Patient not interested at this time      BMI:   Estimated body mass index is 38.97 kg/m  as calculated from the following:    Height as of this encounter: 1.6 m (5' 3\").    Weight as of this encounter: 99.8 kg (220 lb).           Lilliam Rowan PA-C  [facility]   Rimma is a 57 year old, presenting for the following health issues:  Establish Care (Like to talk about meds , going out of town )    Additional Questions 3/24/2023   Roomed by julius     History of Present Illness       Reason for visit:  To    She eats 2-3 servings of fruits and vegetables daily.She consumes 1 sweetened " "beverage(s) daily.She exercises with enough effort to increase her heart rate 9 or less minutes per day.  She exercises with enough effort to increase her heart rate 3 or less days per week. She is missing 5 dose(s) of medications per week.    Today's PHQ-9         PHQ-9 Total Score: 0    PHQ-9 Q9 Thoughts of better off dead/self-harm past 2 weeks :   Not at all    How difficult have these problems made it for you to do your work, take care of things at home, or get along with other people: Not difficult at all  Today's PAMELA-7 Score: 6     -T2DM, does not take her insulin  -tried Invokana and read side effects about losing a limb so she stopped  -uses cinnamon pill, garlic pill instead  -checks sugars, today was 160 fasting    -HTN managed well, she does take these medications    -she only uses Lexapro when she feels she needs it.  Does not take it if she feels good.       Review of Systems   Constitutional, HEENT, cardiovascular, pulmonary, gi and gu systems are negative, except as otherwise noted.      Objective    /85 (BP Location: Right leg, Patient Position: Sitting, Cuff Size: Adult Large)   Pulse 68   Temp 98.3  F (36.8  C) (Temporal)   Resp 16   Ht 1.6 m (5' 3\")   Wt 99.8 kg (220 lb)   BMI 38.97 kg/m    Body mass index is 38.97 kg/m .  Physical Exam   GENERAL: healthy, alert and no distress  EYES: Eyes grossly normal to inspection, PERRL and conjunctivae and sclerae normal  HENT: ear canals and TM's normal, nose and mouth without ulcers or lesions  NECK: no adenopathy, no asymmetry, masses, or scars and thyroid normal to palpation  RESP: lungs clear to auscultation - no rales, rhonchi or wheezes  CV: regular rate and rhythm, normal S1 S2, no S3 or S4, no murmur, click or rub, no peripheral edema and peripheral pulses strong  ABDOMEN: soft, nontender, no hepatosplenomegaly, no masses and bowel sounds normal  MS: no gross musculoskeletal defects noted, no edema  SKIN: no suspicious lesions or " rashes  NEURO: Normal strength and tone, mentation intact and speech normal  PSYCH: mentation appears normal, affect normal/bright    Results for orders placed or performed in visit on 03/24/23 (from the past 24 hour(s))   Hemoglobin A1c   Result Value Ref Range    Hemoglobin A1C 7.2 (H) 0.0 - 5.6 %

## 2023-03-25 LAB
ALBUMIN SERPL BCG-MCNC: 4 G/DL (ref 3.5–5.2)
ALP SERPL-CCNC: 77 U/L (ref 35–104)
ALT SERPL W P-5'-P-CCNC: 20 U/L (ref 10–35)
ANION GAP SERPL CALCULATED.3IONS-SCNC: 15 MMOL/L (ref 7–15)
AST SERPL W P-5'-P-CCNC: 23 U/L (ref 10–35)
BILIRUB SERPL-MCNC: 0.2 MG/DL
BUN SERPL-MCNC: 19.6 MG/DL (ref 6–20)
CALCIUM SERPL-MCNC: 9.7 MG/DL (ref 8.6–10)
CHLORIDE SERPL-SCNC: 106 MMOL/L (ref 98–107)
CREAT SERPL-MCNC: 0.89 MG/DL (ref 0.51–0.95)
CREAT UR-MCNC: 207 MG/DL
DEPRECATED HCO3 PLAS-SCNC: 21 MMOL/L (ref 22–29)
GFR SERPL CREATININE-BSD FRML MDRD: 75 ML/MIN/1.73M2
GLUCOSE SERPL-MCNC: 98 MG/DL (ref 70–99)
MICROALBUMIN UR-MCNC: 3192 MG/L
MICROALBUMIN/CREAT UR: 1542.03 MG/G CR (ref 0–25)
POTASSIUM SERPL-SCNC: 3.5 MMOL/L (ref 3.4–5.3)
PROT SERPL-MCNC: 7 G/DL (ref 6.4–8.3)
SODIUM SERPL-SCNC: 142 MMOL/L (ref 136–145)

## 2023-04-18 ENCOUNTER — TRANSFERRED RECORDS (OUTPATIENT)
Dept: HEALTH INFORMATION MANAGEMENT | Facility: CLINIC | Age: 58
End: 2023-04-18
Payer: COMMERCIAL

## 2023-05-02 ENCOUNTER — MEDICAL CORRESPONDENCE (OUTPATIENT)
Dept: HEALTH INFORMATION MANAGEMENT | Facility: CLINIC | Age: 58
End: 2023-05-02
Payer: COMMERCIAL

## 2023-05-06 ENCOUNTER — HEALTH MAINTENANCE LETTER (OUTPATIENT)
Age: 58
End: 2023-05-06

## 2023-05-10 ENCOUNTER — OFFICE VISIT (OUTPATIENT)
Dept: DERMATOLOGY | Facility: CLINIC | Age: 58
End: 2023-05-10
Payer: COMMERCIAL

## 2023-05-10 DIAGNOSIS — L66.10 LICHEN PLANOPILARIS: Primary | ICD-10-CM

## 2023-05-10 PROCEDURE — 11901 INJECT SKIN LESIONS >7: CPT | Performed by: PHYSICIAN ASSISTANT

## 2023-05-10 PROCEDURE — 99213 OFFICE O/P EST LOW 20 MIN: CPT | Mod: 25 | Performed by: PHYSICIAN ASSISTANT

## 2023-05-10 ASSESSMENT — PAIN SCALES - GENERAL: PAINLEVEL: NO PAIN (0)

## 2023-05-10 NOTE — LETTER
5/10/2023         RE: Rimma Sarmiento  [address]  [place] [address]        Dear Colleague,    Thank you for referring your patient, Rimma Sarmiento, to the [facility]. Please see a copy of my visit note below.    [facility] Dermatology Note  Encounter Date: May 10, 2023  Office Visit     Dermatology Problem List:  1. 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  Pt reports improvement though hair is slow to come in. Has been following treatment regimen.  - Global photography obtained.  - Continue OTC minoxidil 5% foam or solution once daily   - Continue fluocinolone oil three times weekly   - Continue doxycycline 100 mg BID.   - Recommend Head & Shoulder Royal Oil shampoo  - For alopecia, will continue with vitamin D 2,000 IU    Procedures Performed:   - Intra-lesional triamcinolone procedure note. After verbal consent and review of risk of pain and skin thinning/atrophy, positioning and cleansing with isopropyl alcohol, 3 total mL of triamcinolone 2.5 mg/mL was injected into approximately 20 site(s) in a grid-like pattern on the frontal and crown of scalp. The patient tolerated the procedure well and left the dermatology clinic in good condition.     Follow-up: 3 month(s) in-person, or earlier for new or changing lesions    Staff and Scribe:     Scribe Disclosure:   I, Yoseph Joe, am serving as a scribe to document services personally performed by this physician, Merlyn Lee PA-C, based on data collection and the provider's statements to me.   Provider Disclosure:   The documentation recorded by the scribe accurately reflects the services I personally performed and the decisions made by me.    All risks, benefits and  alternatives were discussed with patient.  Patient is in agreement and understands the assessment and plan.  All questions were answered.    Merlyn Lee PA-C, MPAS  [facility] [place]: Phone: [phone], Fax: [phone]  [facility]: Phone: [phone],  Fax: [phone]  [facility]: Phone: [phone], Fax: [phone]    ____________________________________________    CC: Hair Loss (Hair loss follow up. Patient states she notices hair growth but slow to come in. )    HPI:  Ms. Rimma Sarmiento is a(n) 57 year old female who presents today as a return patient for LPP.    Last seen 2/17/23 for ILK injections. Pt instructed to continue topical regimen and doxycycline 100 mg twice daily.     Today, she reports that some hair is growing, but it is slow to come in.     Patient is otherwise feeling well, without additional skin concerns.    Labs Reviewed:  N/A    Physical Exam:  Vitals: There were no vitals taken for this visit.  SKIN: Focused examination of scalp was performed.  - 1-2 cm fibers of hair growth on the frontal scalp.   - Compared to photographs from 6/21/22, some regrowth noted on the right temporal scalp and left temporal scalp.  - No other lesions of concern on areas examined.                               Medications:  Current Outpatient Medications   Medication     aspirin 81 MG EC tablet     atorvastatin (LIPITOR) 80 MG tablet     blood glucose (NO BRAND SPECIFIED) test strip     Blood Glucose Monitoring Suppl (FIFTY50 GLUCOSE METER 2.0) w/Device KIT     carvedilol (COREG) 25 MG tablet     clindamycin (CLINDAMAX) 1 % external gel     escitalopram (LEXAPRO) 10 MG tablet     exenatide ER (BYDUREON BCISE) 2 MG/0.85ML auto-injector     Fluocinolone Acetonide Scalp (DERMA-SMOOTHE/FS SCALP) 0.01 % OIL oil     hydrochlorothiazide (HYDRODIURIL) 25 MG tablet     hydrOXYzine (ATARAX) 25 MG tablet      losartan (COZAAR) 100 MG tablet     Vitamin D3 (CHOLECALCIFEROL) 25 mcg (1000 units) tablet     zolpidem (AMBIEN) 5 MG tablet     clopidogrel (PLAVIX) 75 MG tablet     doxycycline monohydrate (MONODOX) 100 MG capsule     ibuprofen (ADVIL/MOTRIN) 200 MG tablet     insulin aspart (NOVOLOG FLEXPEN) 100 UNIT/ML pen     insulin aspart (NOVOLOG VIAL) 100 UNITS/ML vial     insulin glargine (LANTUS PEN) 100 UNIT/ML pen     insulin glargine (LANTUS PEN) 100 UNIT/ML pen     nicotine (NICOTROL) 10 MG inhaler     pantoprazole (PROTONIX) 40 MG EC tablet     Vitamin D3 (CHOLECALCIFEROL) 25 mcg (1000 units) tablet     No current facility-administered medications for this visit.      Past Medical History:   Patient Active Problem List   Diagnosis     Alopecia     Anomalous optic nerve (H)     Anxiety     Blurring of visual image     Decreased peripheral vision     Gastroesophageal reflux disease     Homonymous hemianopsia due to old cerebral infarction     Mixed hyperlipidemia     Essential hypertension     Hypertensive urgency     Impingement syndrome of right shoulder     Microalbuminuria     Morbid obesity (H)     Morbid obesity with BMI of 40.0-44.9, adult (H)     Nontraumatic complete tear of right rotator cuff     SUSIE (obstructive sleep apnea)     Other acute postprocedural pain     Pain of lower extremity     Recurrent major depressive episodes (H)     Routine general medical examination at a health care facility     Tobacco use disorder     Type 2 diabetes mellitus with hyperglycemia, with long-term current use of insulin (H)     Undersocialized conduct disorder, aggressive type     Ventricular tachycardia, non-sustained (H)     Vitamin D deficiency     History of colonic polyps     History of electrophysiologic study for VT which was not found on 9/20/2019     History of CVA (cerebrovascular accident)     Chronic diastolic heart failure (H)     Tobacco abuse counseling     Fatigue, unspecified type     Cerebral  infarction (H)     Cerebrovascular accident (CVA) due to bilateral embolism of posterior cerebral arteries (H)     CKD stage G2/A3, GFR 60-89 and albumin creatinine ratio >300 mg/g     Combined forms of age-related cataract of both eyes     Dry eyes, bilateral     Myopia of both eyes with astigmatism and presbyopia     Sensorineural hearing loss, bilateral     Past Medical History:   Diagnosis Date     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 MRI IMPRESSION: 1.  No acute intracranial abnormality. 2.  Normal MRI of the internal auditory canals and labyrinthine structures. 3.  Old infarctions in the left occipital lobe and left frontal centrum [place]     CKD stage G2/A3, GFR 60-89 and albumin creatinine ratio >300 mg/g 01/18/2022     Combined forms of age-related cataract of both eyes 02/23/2022    Last Assessment & Plan:  Formatting of this note might be different from the original. Mild bilateral cataracts. Not visually significant. - Monitor     cva 2014    right sided hemiparesis, resolved     Depression      Dizzy spells     hospitalized at [facility] in the past     DM (diabetes mellitus) (H)      High cholesterol 09/17/2019     History of colonic polyps 09/24/2020 4/2/2019--hyperplastic x 1--repeat colonoscopy 5 years 6/29/2016--6 polyps, mix of tubular adenoma and hyperplastic polyps.     Hypertension      Mixed hyperlipidemia 03/10/2010     Morbid obesity (H) 01/27/2004     Sleep apnea     uses CPAP     Tobacco use disorder 10/03/2008     Type 2 diabetes mellitus (H) 10/23/2008     Type 2 diabetes mellitus with hyperglycemia, with long-term current use of insulin (H) 10/23/2008    Diabetes dx'd:      Last A1c: HGA1C      7.0   1/4/2013     Last Chol: CHOL      199   10/16/2012 TRIG      193   10/16/2012 HDL       39   10/16/2012  LDL      171   6/9/2011     Microalbumin:  ALBCR     2832   2/8/2012        GFR      >60   10/16/2012   CREAT     0.93   10/16/2012    Smoker:  YES.  Aspirin:     Yes     Last eye exam: Diabetes dx'd:      Last A1c: HGA1C      7.0   1/4/2013     [place]       Again, thank you for allowing me to participate in the care of your patient.        Sincerely,        Merlyn Lee PA-C

## 2023-05-10 NOTE — PATIENT INSTRUCTIONS
Intralesional Kenalog (ILK) Injections    Intralesional steroid injection involves a corticosteroid, such as triamcinolone acetonide (brand name Kenalog), which is injected directly into a lesion on or immediately below the skin. Intralesional kenalog may be used to treat the following skin conditions:    Alopecia areata  Discoid lupus erythematosus  Keloids/hypertrophic scars  Granuloma annulare and other granulomatous disorders  Hypertrophic lichen planus  Lichen simplex chronicus (neurodermatitis)  Localised psoriasis  Necrobiosis lipoidica  Acne cysts (nodulocystic acne)  Small infantile hemangiomas    Possible side-effects of intralesional Kenalog (ILK) injections include bleeding, pain, infection, contact dermatitis, nerve damage, scar formation and need for a repeat procedure.    Some people may experience delayed side-effects including:  Lipoatrophy, appearing as skin indentations or dimples around the injection sites a few weeks after treatment; these may be permanent.  White marks (leukoderma) or brown marks (postinflammatory pigmentation) at the site of injection or spreading from the site of injection - these may resolve or persist long term.  Telangiectasia, or small dilated blood vessels at the site of injection.   Increased hair growth at the site of injection - this resolves eventually.  Steroid acne: steroids increase growth hormone, leading to increased sebum (oil) production by the sebaceous glands. Steroid acne generally improves once the steroid has been stopped.      Who should I call with questions?  [facility]: [phone]   [facility]: [phone]  For urgent needs outside of business hours call the [facility] at [phone] and ask for the dermatology resident on call

## 2023-05-10 NOTE — PROGRESS NOTES
[facility] Dermatology Note  Encounter Date: May 10, 2023  Office Visit     Dermatology Problem List:  1. 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  Pt reports improvement though hair is slow to come in. Has been following treatment regimen.  - Global photography obtained.  - Continue OTC minoxidil 5% foam or solution once daily   - Continue fluocinolone oil three times weekly   - Continue doxycycline 100 mg BID.   - Recommend Head & Shoulder Royal Oil shampoo  - For alopecia, will continue with vitamin D 2,000 IU    Procedures Performed:   - Intra-lesional triamcinolone procedure note. After verbal consent and review of risk of pain and skin thinning/atrophy, positioning and cleansing with isopropyl alcohol, 3 total mL of triamcinolone 2.5 mg/mL was injected into approximately 20 site(s) in a grid-like pattern on the frontal and crown of scalp. The patient tolerated the procedure well and left the dermatology clinic in good condition.     Follow-up: 3 month(s) in-person, or earlier for new or changing lesions    Staff and Scribe:     Scribe Disclosure:   I, Yoseph Joe, am serving as a scribe to document services personally performed by this physician, Merlyn Lee PA-C, based on data collection and the provider's statements to me.   Provider Disclosure:   The documentation recorded by the scribe accurately reflects the services I personally performed and the decisions made by me.    All risks, benefits and alternatives were discussed with patient.  Patient is in agreement and understands the assessment and plan.  All questions were answered.    Merlyn Lee PA-C, MPAS  [facility]: Phone: [phone],  Fax: [phone]  [facility]: Phone: [phone],  Fax: [phone]  [facility]: Phone: [phone], Fax: [phone]    ____________________________________________    CC: Hair Loss (Hair loss follow up. Patient states she notices hair growth but slow to come in. )    HPI:  Ms. Rimma Sarmiento is a(n) 57 year old female who presents today as a return patient for LPP.    Last seen 2/17/23 for ILK injections. Pt instructed to continue topical regimen and doxycycline 100 mg twice daily.     Today, she reports that some hair is growing, but it is slow to come in.     Patient is otherwise feeling well, without additional skin concerns.    Labs Reviewed:  N/A    Physical Exam:  Vitals: There were no vitals taken for this visit.  SKIN: Focused examination of scalp was performed.  - 1-2 cm fibers of hair growth on the frontal scalp.   - Compared to photographs from 6/21/22, some regrowth noted on the right temporal scalp and left temporal scalp.  - No other lesions of concern on areas examined.                               Medications:  Current Outpatient Medications   Medication     aspirin 81 MG EC tablet     atorvastatin (LIPITOR) 80 MG tablet     blood glucose (NO BRAND SPECIFIED) test strip     Blood Glucose Monitoring Suppl (FIFTY50 GLUCOSE METER 2.0) w/Device KIT     carvedilol (COREG) 25 MG tablet     clindamycin (CLINDAMAX) 1 % external gel     escitalopram (LEXAPRO) 10 MG tablet     exenatide ER (BYDUREON BCISE) 2 MG/0.85ML auto-injector     Fluocinolone Acetonide Scalp (DERMA-SMOOTHE/FS SCALP) 0.01 % OIL oil     hydrochlorothiazide (HYDRODIURIL) 25 MG tablet     hydrOXYzine (ATARAX) 25 MG tablet     losartan (COZAAR) 100 MG tablet     Vitamin D3 (CHOLECALCIFEROL) 25 mcg (1000 units) tablet     zolpidem (AMBIEN) 5 MG tablet     clopidogrel (PLAVIX) 75 MG tablet     doxycycline monohydrate (MONODOX) 100 MG capsule     ibuprofen (ADVIL/MOTRIN) 200 MG tablet      insulin aspart (NOVOLOG FLEXPEN) 100 UNIT/ML pen     insulin aspart (NOVOLOG VIAL) 100 UNITS/ML vial     insulin glargine (LANTUS PEN) 100 UNIT/ML pen     insulin glargine (LANTUS PEN) 100 UNIT/ML pen     nicotine (NICOTROL) 10 MG inhaler     pantoprazole (PROTONIX) 40 MG EC tablet     Vitamin D3 (CHOLECALCIFEROL) 25 mcg (1000 units) tablet     No current facility-administered medications for this visit.      Past Medical History:   Patient Active Problem List   Diagnosis     Alopecia     Anomalous optic nerve (H)     Anxiety     Blurring of visual image     Decreased peripheral vision     Gastroesophageal reflux disease     Homonymous hemianopsia due to old cerebral infarction     Mixed hyperlipidemia     Essential hypertension     Hypertensive urgency     Impingement syndrome of right shoulder     Microalbuminuria     Morbid obesity (H)     Morbid obesity with BMI of 40.0-44.9, adult (H)     Nontraumatic complete tear of right rotator cuff     SUSIE (obstructive sleep apnea)     Other acute postprocedural pain     Pain of lower extremity     Recurrent major depressive episodes (H)     Routine general medical examination at a health care facility     Tobacco use disorder     Type 2 diabetes mellitus with hyperglycemia, with long-term current use of insulin (H)     Undersocialized conduct disorder, aggressive type     Ventricular tachycardia, non-sustained (H)     Vitamin D deficiency     History of colonic polyps     History of electrophysiologic study for VT which was not found on 9/20/2019     History of CVA (cerebrovascular accident)     Chronic diastolic heart failure (H)     Tobacco abuse counseling     Fatigue, unspecified type     Cerebral infarction (H)     Cerebrovascular accident (CVA) due to bilateral embolism of posterior cerebral arteries (H)     CKD stage G2/A3, GFR 60-89 and albumin creatinine ratio >300 mg/g     Combined forms of age-related cataract of both eyes     Dry eyes, bilateral     Myopia of  both eyes with astigmatism and presbyopia     Sensorineural hearing loss, bilateral     Past Medical History:   Diagnosis Date     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 MRI IMPRESSION: 1.  No acute intracranial abnormality. 2.  Normal MRI of the internal auditory canals and labyrinthine structures. 3.  Old infarctions in the left occipital lobe and left frontal centrum [place]     CKD stage G2/A3, GFR 60-89 and albumin creatinine ratio >300 mg/g 01/18/2022     Combined forms of age-related cataract of both eyes 02/23/2022    Last Assessment & Plan:  Formatting of this note might be different from the original. Mild bilateral cataracts. Not visually significant. - Monitor     cva 2014    right sided hemiparesis, resolved     Depression      Dizzy spells     hospitalized at [facility] in the past     DM (diabetes mellitus) (H)      High cholesterol 09/17/2019     History of colonic polyps 09/24/2020 4/2/2019--hyperplastic x 1--repeat colonoscopy 5 years 6/29/2016--6 polyps, mix of tubular adenoma and hyperplastic polyps.     Hypertension      Mixed hyperlipidemia 03/10/2010     Morbid obesity (H) 01/27/2004     Sleep apnea     uses CPAP     Tobacco use disorder 10/03/2008     Type 2 diabetes mellitus (H) 10/23/2008     Type 2 diabetes mellitus with hyperglycemia, with long-term current use of insulin (H) 10/23/2008    Diabetes dx'd:      Last A1c: HGA1C      7.0   1/4/2013     Last Chol: CHOL      199   10/16/2012 TRIG      193   10/16/2012 HDL       39   10/16/2012 LDL      171   6/9/2011     Microalbumin:  ALBCR     2832   2/8/2012        GFR      >60   10/16/2012   CREAT     0.93   10/16/2012    Smoker:  YES.  Aspirin:     Yes     Last eye exam: Diabetes dx'd:      Last A1c: HGA1C      7.0   1/4/2013     [place]

## 2023-05-10 NOTE — NURSING NOTE
Rimma Sarmiento's goals for this visit include:   Chief Complaint   Patient presents with     Hair Loss     Hair loss follow up. Patient states she notices hair growth but slow to come in.        She requests these members of her care team be copied on today's visit information:       PCP: Melida Turner    Referring Provider:  No referring provider defined for this encounter.    There were no vitals taken for this visit.    Do you need any medication refills at today's visit? Stacy Isaac CMA on 5/10/2023 at 1:55 PM

## 2023-05-24 ENCOUNTER — MEDICAL CORRESPONDENCE (OUTPATIENT)
Dept: HEALTH INFORMATION MANAGEMENT | Facility: CLINIC | Age: 58
End: 2023-05-24
Payer: COMMERCIAL

## 2023-06-30 ENCOUNTER — OFFICE VISIT (OUTPATIENT)
Dept: OPHTHALMOLOGY | Facility: CLINIC | Age: 58
End: 2023-06-30
Payer: COMMERCIAL

## 2023-06-30 DIAGNOSIS — H52.203 MYOPIA OF BOTH EYES WITH ASTIGMATISM AND PRESBYOPIA: ICD-10-CM

## 2023-06-30 DIAGNOSIS — E11.9 TYPE 2 DIABETES MELLITUS WITHOUT COMPLICATION, WITHOUT LONG-TERM CURRENT USE OF INSULIN (H): Primary | ICD-10-CM

## 2023-06-30 DIAGNOSIS — H04.129 DRY EYE: ICD-10-CM

## 2023-06-30 DIAGNOSIS — H52.4 MYOPIA OF BOTH EYES WITH ASTIGMATISM AND PRESBYOPIA: ICD-10-CM

## 2023-06-30 DIAGNOSIS — H52.13 MYOPIA OF BOTH EYES WITH ASTIGMATISM AND PRESBYOPIA: ICD-10-CM

## 2023-06-30 DIAGNOSIS — H53.461 RIGHT HOMONYMOUS HEMIANOPSIA: ICD-10-CM

## 2023-06-30 PROCEDURE — 92004 COMPRE OPH EXAM NEW PT 1/>: CPT | Performed by: OPTOMETRIST

## 2023-06-30 PROCEDURE — 92015 DETERMINE REFRACTIVE STATE: CPT | Performed by: OPTOMETRIST

## 2023-06-30 RX ORDER — CARBOXYMETHYLCELLULOSE SODIUM 5 MG/ML
1 SOLUTION/ DROPS OPHTHALMIC 4 TIMES DAILY PRN
Qty: 70 EACH | Refills: 11 | Status: SHIPPED | OUTPATIENT
Start: 2023-06-30

## 2023-06-30 ASSESSMENT — CONF VISUAL FIELD
OS_INFERIOR_NASAL_RESTRICTION: 1
OS_INFERIOR_TEMPORAL_RESTRICTION: 0
OS_SUPERIOR_TEMPORAL_RESTRICTION: 0
OD_SUPERIOR_NASAL_RESTRICTION: 0
OD_SUPERIOR_TEMPORAL_RESTRICTION: 1
OD_INFERIOR_NASAL_RESTRICTION: 0
OS_SUPERIOR_NASAL_RESTRICTION: 1
OD_INFERIOR_TEMPORAL_RESTRICTION: 1
METHOD: COUNTING FINGERS

## 2023-06-30 ASSESSMENT — VISUAL ACUITY
OD_SC+: -3
OS_SC+: -2
OS_SC: 20/25
METHOD: SNELLEN - LINEAR
OD_SC: 20/25

## 2023-06-30 ASSESSMENT — REFRACTION_MANIFEST
OD_CYLINDER: +2.00
OD_AXIS: 099
OS_ADD: +2.50
OD_ADD: +2.50
OS_SPHERE: -1.00
OD_SPHERE: -1.75
OS_CYLINDER: +1.25
OS_AXIS: 083

## 2023-06-30 ASSESSMENT — TONOMETRY
IOP_METHOD: ICARE
OD_IOP_MMHG: 18
OS_IOP_MMHG: 17

## 2023-06-30 ASSESSMENT — CUP TO DISC RATIO
OD_RATIO: 0.30
OS_RATIO: 0.30

## 2023-06-30 ASSESSMENT — SLIT LAMP EXAM - LIDS
COMMENTS: NORMAL
COMMENTS: NORMAL

## 2023-06-30 ASSESSMENT — EXTERNAL EXAM - LEFT EYE: OS_EXAM: NORMAL

## 2023-06-30 ASSESSMENT — EXTERNAL EXAM - RIGHT EYE: OD_EXAM: NORMAL

## 2023-06-30 NOTE — PATIENT INSTRUCTIONS
Artificial tears: (Water-like consistency. Can be used during the daytime)  -Refresh Plus  -Refresh Optive  -Refresh Relieva  -Systane Ultra  -Systane Complete  -Systane Hydration  -Biotrue Hydration Boost  (Notes: Anything in a bottle has preservatives and can be used up to 4x/day. Preservative free vials can be used as much as necessary)

## 2023-06-30 NOTE — PROGRESS NOTES
A/P  1.) Type 2 DM without ophthalmic manifestation OU  -Last A1c 7.3, no previous h/o retinopathy  -Reviewed effects of DM on the eyes and importance of good blood sugar control  -Monitor annually with dilation    2.) s/p stroke with residual right sided hemianopsia  -Some trouble ambulating environments, would be interested in pelli prism eval    3.) Myopia/Astigmatism/Presbyopia OU  -Corrects well with updated Rx today  -Possible interest in CL, previously wore but long time ago. Would want I&R refresher    RTC same day for Pelli prism eval with Dr. Livingston and CL eval with me    I have confirmed the patient's CC, HPI and reviewed Past Medical History, Past Surgical History, Social History, Family History, Problem List, Medication List and agree with Tech note.     Tierney Ordoñez, JAILENE BENSONO MELISSAS

## 2023-06-30 NOTE — NURSING NOTE
Chief Complaints and History of Present Illnesses   Patient presents with     Annual Eye Exam     Chief Complaint(s) and History of Present Illness(es)     Annual Eye Exam            Laterality: both eyes    Associated symptoms: dryness and burning.  Negative for flashes and floaters    Pain scale: 0/10          Comments    Patient states eyes burn and are dry. Patient lost her glasses about one year ago.  VIOLA Ugalde June 30, 2023 12:07 PM

## 2023-07-19 ENCOUNTER — APPOINTMENT (OUTPATIENT)
Dept: OPTOMETRY | Facility: CLINIC | Age: 58
End: 2023-07-19
Payer: COMMERCIAL

## 2023-07-19 PROCEDURE — 92341 FIT SPECTACLES BIFOCAL: CPT | Performed by: OPTOMETRIST

## 2023-07-21 ENCOUNTER — E-VISIT (OUTPATIENT)
Dept: FAMILY MEDICINE | Facility: CLINIC | Age: 58
End: 2023-07-21
Payer: COMMERCIAL

## 2023-07-21 ENCOUNTER — NURSE TRIAGE (OUTPATIENT)
Dept: NURSING | Facility: CLINIC | Age: 58
End: 2023-07-21

## 2023-07-21 DIAGNOSIS — Z79.4 TYPE 2 DIABETES MELLITUS WITH HYPERGLYCEMIA, WITH LONG-TERM CURRENT USE OF INSULIN (H): Primary | ICD-10-CM

## 2023-07-21 DIAGNOSIS — R42 DIZZINESS: Primary | ICD-10-CM

## 2023-07-21 DIAGNOSIS — E11.65 TYPE 2 DIABETES MELLITUS WITH HYPERGLYCEMIA, WITH LONG-TERM CURRENT USE OF INSULIN (H): Primary | ICD-10-CM

## 2023-07-21 PROCEDURE — 99207 PR NON-BILLABLE SERV PER CHARTING: CPT | Performed by: STUDENT IN AN ORGANIZED HEALTH CARE EDUCATION/TRAINING PROGRAM

## 2023-07-21 RX ORDER — INSULIN PUMP SYRINGE, 3 ML
EACH MISCELLANEOUS
Status: CANCELLED | OUTPATIENT
Start: 2023-07-21

## 2023-07-21 NOTE — TELEPHONE ENCOUNTER
Triage Call:    Caller: Patient  Having dizzy spells and when they do, she has to sti and wait for approx 20 minutes for them to go away.  They started happening after her last injection of hormones for hair regrowth in May.    She had one earlier today, is not currently having a dizzy spell.      Protocol Recommended Disposition: Discuss with Derm specialty and call back to patient. As patient is concerned that the dizzy spells are connected to the hair hormone injections .     Caller verbalized understanding of instructions and questions answered.      Sherlyn Aldana RN on 7/21/2023 at 3:36 PM      Reason for Disposition   Taking a medicine that could cause dizziness (e.g., blood pressure medications, diuretics)    Additional Information   Negative: SEVERE difficulty breathing (e.g., struggling for each breath, speaks in single words)   Negative: Shock suspected (e.g., cold/pale/clammy skin, too weak to stand, low BP, rapid pulse)   Negative: Difficult to awaken or acting confused (e.g., disoriented, slurred speech)   Negative: Fainted, and still feels dizzy afterwards   Negative: Overdose (accidental or intentional) of medications   Negative: New neurologic deficit that is present now: * Weakness of the face, arm, or leg on one side of the body * Numbness of the face, arm, or leg on one side of the body * Loss of speech or garbled speech   Negative: Heart beating < 50 beats per minute OR > 140 beats per minute   Negative: Sounds like a life-threatening emergency to the triager   Negative: SEVERE dizziness (e.g., unable to stand, requires support to walk, feels like passing out now)   Negative: SEVERE headache or neck pain   Negative: Spinning or tilting sensation (vertigo) present now and one or more stroke risk factors (i.e., hypertension, diabetes mellitus, prior stroke/TIA, heart attack, age over 60) (Exception: prior physician evaluation for this AND no different/worse than usual)   Negative: Neurologic  deficit that was brief (now gone), ANY of the following:* Weakness of the face, arm, or leg on one side of the body* Numbness of the face, arm, or leg on one side of the body* Loss of speech or garbled speech   Negative: Loss of vision or double vision  (Exception: Similar to previous migraines.)   Negative: Extra heart beats OR irregular heart beating (i.e., 'palpitations')   Negative: Difficulty breathing   Negative: Drinking very little and has signs of dehydration (e.g., no urine > 12 hours, very dry mouth, very lightheaded)   Negative: Follows bleeding (e.g., stomach, rectum, vagina)  (Exception: Became dizzy from sight of small amount blood.)   Negative: Patient sounds very sick or weak to the triager   Negative: Lightheadedness (dizziness) present now, after 2 hours of rest and fluids   Negative: Spinning or tilting sensation (vertigo) present now   Negative: Fever > 103 F (39.4 C)   Negative: Fever > 100.0 F (37.8 C) and has diabetes mellitus or a weak immune system (e.g., HIV positive, cancer chemotherapy, organ transplant, splenectomy, chronic steroids)   Negative: MODERATE dizziness (e.g., interferes with normal activities) (Exception: dizziness caused by heat exposure, sudden standing, or poor fluid intake)   Negative: Vomiting occurs with dizziness   Negative: Patient wants to be seen    Protocols used: Dizziness-A-OH

## 2023-07-21 NOTE — TELEPHONE ENCOUNTER
" Patient called in for another reason, triaged, but then she also mentioned that  She is also looking for her glucometer and can't find one, she isn't using any insulin, as she is taking an oral medication.   They gave her another medication \"insulin\" to try, but it is still sitting in \"there\".  Once she stopped taking the insulin, the weight loss \"started going down\", but she hasn't been checking her sugars.  She isn't sure if she lost it on a recent trip to [place] or not.    Routing refill request.     Sherlyn Aldana RN on 7/21/2023 at 3:48 PM        "

## 2023-07-21 NOTE — PATIENT INSTRUCTIONS
Thank you for choosing us for your care. Based on your symptoms and length of illness, I do not think that you need a prescription at this time.  Please follow the care advise I've provided and use the over the counter medications to help relieve your symptoms.   View your full visit summary for details by clicking on the link below.     If you're not feeling better within 2-3 days, please respond to this message and we can consider if a prescription is needed.  You can schedule an appointment right here in [facility], or call [phone]  If the visit is for the same symptoms as your eVisit, we'll refund the cost of your eVisit if seen within seven days.

## 2023-07-21 NOTE — TELEPHONE ENCOUNTER
Pt called after speaking to Dr Naqvi. The effects of the steroid injections wears off after 4 weeks so her dizziness is not from the injections. Pt told to seek urgent care or her primary care doctor to discuss dizziness. Pt expressed understanding and will schedule with her primary care doctor.      Ida Villasenor RN on 7/21/2023 at 4:14 PM

## 2023-07-27 ENCOUNTER — TELEPHONE (OUTPATIENT)
Dept: OPTOMETRY | Facility: CLINIC | Age: 58
End: 2023-07-27

## 2023-07-27 NOTE — TELEPHONE ENCOUNTER
Spoke with patient regarding confirming eye appts for 7/28. Patient stated she would be able to make appts.

## 2023-07-28 ENCOUNTER — OFFICE VISIT (OUTPATIENT)
Dept: OPHTHALMOLOGY | Facility: CLINIC | Age: 58
End: 2023-07-28
Payer: COMMERCIAL

## 2023-07-28 DIAGNOSIS — H53.461 RIGHT HOMONYMOUS HEMIANOPSIA: Primary | ICD-10-CM

## 2023-07-28 DIAGNOSIS — H52.203 MYOPIA OF BOTH EYES WITH ASTIGMATISM AND PRESBYOPIA: Primary | ICD-10-CM

## 2023-07-28 DIAGNOSIS — H52.4 MYOPIA OF BOTH EYES WITH ASTIGMATISM AND PRESBYOPIA: Primary | ICD-10-CM

## 2023-07-28 DIAGNOSIS — H52.13 MYOPIA OF BOTH EYES WITH ASTIGMATISM AND PRESBYOPIA: Primary | ICD-10-CM

## 2023-07-28 DIAGNOSIS — R45.89 COMPLAINT OF FEELING DEPRESSED: ICD-10-CM

## 2023-07-28 PROCEDURE — 92310 CONTACT LENS FITTING OU: CPT | Performed by: OPTOMETRIST

## 2023-07-28 PROCEDURE — 99212 OFFICE O/P EST SF 10 MIN: CPT

## 2023-07-28 ASSESSMENT — VISUAL ACUITY
OS_CC+: -2
OD_CC+: -2
CORRECTION_TYPE: CONTACTS
CORRECTION_TYPE: CONTACTS
OD_CC+: -2
OD_CC: 20/25
OS_CC+: -2
OD_CC: 20/25
OS_CC: 20/25
METHOD: SNELLEN - LINEAR
OS_CC: 20/25

## 2023-07-28 ASSESSMENT — REFRACTION_CURRENTRX
OS_DIAMETER: 14.5
OS_SPHERE: PLANO
OD_BASECURVE: 8.7
OS_BASECURVE: 8.7
OS_CYLINDER: -1.25
OS_AXIS: 170
OS_BRAND: BIOFINITY TORIC
OD_SPHERE: PLANO
OD_CYLINDER: -1.75
OD_DIAMETER: 14.5
OD_BRAND: BIOFINITY TORIC
OD_AXIS: 010

## 2023-07-28 ASSESSMENT — CONF VISUAL FIELD
OS_INFERIOR_TEMPORAL_RESTRICTION: 0
OD_SUPERIOR_TEMPORAL_RESTRICTION: 1
OS_SUPERIOR_TEMPORAL_RESTRICTION: 0
OS_SUPERIOR_NASAL_RESTRICTION: 1
OD_INFERIOR_TEMPORAL_RESTRICTION: 1
OS_INFERIOR_NASAL_RESTRICTION: 1

## 2023-07-28 ASSESSMENT — SLIT LAMP EXAM - LIDS
COMMENTS: NORMAL
COMMENTS: NORMAL

## 2023-07-28 ASSESSMENT — REFRACTION_WEARINGRX
OS_SPHERE: -1.00
SPECS_TYPE: PAL
OS_AXIS: 083
OS_CYLINDER: +1.25
OD_CYLINDER: +2.00
OD_ADD: +2.50
OS_ADD: +2.50
OD_AXIS: 099
OD_SPHERE: -1.75

## 2023-07-28 ASSESSMENT — EXTERNAL EXAM - RIGHT EYE: OD_EXAM: NORMAL

## 2023-07-28 ASSESSMENT — EXTERNAL EXAM - LEFT EYE: OS_EXAM: NORMAL

## 2023-07-28 ASSESSMENT — TONOMETRY
OD_IOP_MMHG: 18
IOP_METHOD: ICARE
OS_IOP_MMHG: 19

## 2023-07-28 NOTE — PROGRESS NOTES
Assessment and Plan:  1) Right homonymous hemianopia  A: 2* CVA, longstanding  P: Patient educated on condition and visual status.  Poor response to Peli prism demo in-office.  Counseled on low vision OT to review additional reading strategies.  Follow-up with me as needed, otherwise return to routine eye care.    Complete documentation of historical and exam elements from today's encounter can be found in the full encounter summary report (not reduplicated in this progress note). I personally obtained the chief complaint(s) and history of present illness. I confirmed and edited as necessary the review of systems, past medical/surgical history, family history, social history, and examination findings as document by others; and I examined the patient myself. I personally reviewed the relevant tests, images, and reports as documented above. I formulated and edited as necessary the assessment and plan and discussed the findings and management plan with the patient and family.    Pippa Livingston, OD

## 2023-07-28 NOTE — NURSING NOTE
Chief Complaints and History of Present Illnesses   Patient presents with    Consult For     Pt here for Peli prism.     Chief Complaint(s) and History of Present Illness(es)       Consult For              Laterality: both eyes    Comments: Pt here for Peli prism.              Comments    Pt has unchanged vision since last exam. No other concerns.

## 2023-07-28 NOTE — PROGRESS NOTES
A/P  1.) Astigmatism/Presbyopia OU  -New CL eval today. Good initial vision/comfort/fit  -Successful I&R, reviewed CL care and hygiene today  -Needs ~+2.00/+2.25 OTC readers over CL's    CLRx updated, okay to order if working well. Follow-up prn     I have confirmed where necessary the patient's CC, HPI and reviewed Past Medical History, Past Surgical History, Social History, Family History, Problem List, Medication List and agree with Tech note.     Tierney Ordoñez, J[facility]ENE [facility]ENSONO [facility]

## 2023-07-28 NOTE — TELEPHONE ENCOUNTER
Spoke with the patient she now lives in [place] and has established care elsewhere. I did let her know how have any further refills go to her new PCP.

## 2023-09-08 NOTE — PROGRESS NOTES
[facility] Dermatology Note  Encounter Date: Sep 13, 2023  Office Visit      Dermatology Problem List:  1.  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 Patient noticing regrowth on the temples. Vertex scalp unchanged.   Pt reports improvement though hair is slow to come in. Has been following treatment regimen.  - ILK today - see below. We did increase the dosage today, will plan on lower dose next visit  - Continue OTC minoxidil 5% foam or solution once to twice daily - right now cost prohibitive, so she is not using. Reminded patient need to be consistent with this for best results  - Continue fluocinolone oil three times weekly, refilled  - Stop doxycycline 100 mg BID.   - Recommend Head & Shoulder Royal Oil shampoo  - For alopecia, will continue with vitamin D 2,000 IU    Procedures Performed:   - Intra-lesional triamcinolone procedure note. After verbal consent and review of risk of pain and skin thinning/atrophy, positioning and cleansing with isopropyl alcohol, 2 total mL of triamcinolone 10 mg/mL was injected into 25 lesion(s) on the scalp. The patient tolerated the procedure well and left the dermatology clinic in good condition.     Follow-up: 3 month(s) in-person, or earlier for new or changing lesions    Staff:     All risks, benefits and alternatives were discussed with patient.  Patient is in agreement and understands the assessment and plan.  All questions were answered.    Merlyn Lee PA-C, MPAS  [facility] [place]: Phone: [phone], Fax: [phone]  [facility]: Phone: [phone],  Fax: [phone]  [facility]  [place]: Phone: [phone], Fax: [phone]  ____________________________________________    CC: No chief complaint on file.      HPI:  Ms. Rimma Sarmiento is a 58 year old female who presents today as a return patient for hair loss. Notes no changes since last visit. Betty on the top of the head. No sx of the scalp. Has not been using minoxidil topically as it has been cost prohibitive.     Patient is otherwise feeling well, without additional concerns.    Labs:  none    Physical Exam:  Vitals: There were no vitals taken for this visit.  SKIN: Focused examination of face and scalp was performed.   - 1-2 cm fibers of hair growth on the frontal scalp.   - unchanged on vertex, and mavis temporal scalp  - No other lesions of concern on areas examined.     Medications:  Current Outpatient Medications   Medication    aspirin 81 MG EC tablet    atorvastatin (LIPITOR) 80 MG tablet    blood glucose (NO BRAND SPECIFIED) lancets standard    blood glucose (NO BRAND SPECIFIED) test strip    blood glucose (NO BRAND SPECIFIED) test strip    blood glucose monitoring (NO BRAND SPECIFIED) meter device kit    Blood Glucose Monitoring Suppl (FIFTY50 GLUCOSE METER 2.0) w/Device KIT    carboxymethylcellulose PF (CARBOXYMETHYLCELLULOSE SODIUM) 0.5 % ophthalmic solution    carvedilol (COREG) 25 MG tablet    clindamycin (CLINDAMAX) 1 % external gel    clopidogrel (PLAVIX) 75 MG tablet    doxycycline monohydrate (MONODOX) 100 MG capsule    escitalopram (LEXAPRO) 10 MG tablet    exenatide ER (BYDUREON BCISE) 2 MG/0.85ML auto-injector    Fluocinolone Acetonide Scalp (DERMA-SMOOTHE/FS SCALP) 0.01 % OIL oil    hydrochlorothiazide (HYDRODIURIL) 25 MG tablet    hydrOXYzine (ATARAX) 25 MG tablet    ibuprofen (ADVIL/MOTRIN) 200 MG tablet    insulin aspart (NOVOLOG FLEXPEN) 100 UNIT/ML pen    insulin aspart (NOVOLOG VIAL) 100 UNITS/ML vial    insulin glargine (LANTUS PEN) 100 UNIT/ML pen    insulin glargine (LANTUS PEN) 100 UNIT/ML pen    losartan  (COZAAR) 100 MG tablet    nicotine (NICOTROL) 10 MG inhaler    pantoprazole (PROTONIX) 40 MG EC tablet    Vitamin D3 (CHOLECALCIFEROL) 25 mcg (1000 units) tablet    Vitamin D3 (CHOLECALCIFEROL) 25 mcg (1000 units) tablet    zolpidem (AMBIEN) 5 MG tablet     No current facility-administered medications for this visit.      Past Medical/Surgical History:   Patient Active Problem List   Diagnosis    Alopecia    Anomalous optic nerve (H)    Anxiety    Blurring of visual image    Decreased peripheral vision    Gastroesophageal reflux disease    Homonymous hemianopsia due to old cerebral infarction    Mixed hyperlipidemia    Essential hypertension    Hypertensive urgency    Impingement syndrome of right shoulder    Microalbuminuria    Morbid obesity (H)    Morbid obesity with BMI of 40.0-44.9, adult (H)    Nontraumatic complete tear of right rotator cuff    SUSIE (obstructive sleep apnea)    Other acute postprocedural pain    Pain of lower extremity    Recurrent major depressive episodes (H)    Routine general medical examination at a health care facility    Tobacco use disorder    Type 2 diabetes mellitus with hyperglycemia, with long-term current use of insulin (H)    Undersocialized conduct disorder, aggressive type    Ventricular tachycardia, non-sustained (H)    Vitamin D deficiency    History of colonic polyps    History of electrophysiologic study for VT which was not found on 9/20/2019    History of CVA (cerebrovascular accident)    Chronic diastolic heart failure (H)    Tobacco abuse counseling    Fatigue, unspecified type    Cerebral infarction (H)    Cerebrovascular accident (CVA) due to bilateral embolism of posterior cerebral arteries (H)    CKD stage G2/A3, GFR 60-89 and albumin creatinine ratio >300 mg/g    Combined forms of age-related cataract of both eyes    Dry eyes, bilateral    Myopia of both eyes with astigmatism and presbyopia    Sensorineural hearing loss, bilateral     Past Medical History:    Diagnosis Date    Anomalous optic nerve (H) 01/16/2014    Anxiety     Cerebrovascular accident (CVA) due to bilateral embolism of posterior cerebral arteries (H) 09/05/2019    Formatting of this note might be different from the original. MRI IMPRESSION: 1.  No acute intracranial abnormality. 2.  Normal MRI of the internal auditory canals and labyrinthine structures. 3.  Old infarctions in the left occipital lobe and left frontal centrum Semiovale    CKD stage G2/A3, GFR 60-89 and albumin creatinine ratio >300 mg/g 01/18/2022    Combined forms of age-related cataract of both eyes 02/23/2022    Last Assessment & Plan:  Formatting of this note might be different from the original. Mild bilateral cataracts. Not visually significant. - Monitor    cva 2014    right sided hemiparesis, resolved    Depression     Dizzy spells     hospitalized at [place] in the past    DM (diabetes mellitus) (H)     High cholesterol 09/17/2019    History of colonic polyps 09/24/2020 4/2/2019--hyperplastic x 1--repeat colonoscopy 5 years 6/29/2016--6 polyps, mix of tubular adenoma and hyperplastic polyps.    Hypertension     Mixed hyperlipidemia 03/10/2010    Morbid obesity (H) 01/27/2004    Sleep apnea     uses CPAP    Tobacco use disorder 10/03/2008    Type 2 diabetes mellitus (H) 10/23/2008    Type 2 diabetes mellitus with hyperglycemia, with long-term current use of insulin (H) 10/23/2008    Diabetes dx'd:      Last A1c: HGA1C      7.0   1/4/2013     Last Chol: CHOL      199   10/16/2012 TRIG      193   10/16/2012 HDL       39   10/16/2012 LDL      171   6/9/2011     Microalbumin:  ALBCR     2832   2/8/2012        GFR      >60   10/16/2012   CREAT     0.93   10/16/2012    Smoker:  YES.  Aspirin:     Yes     Last eye exam: Diabetes dx'd:      Last A1c: HGA1C      7.0   1/4/2013     [place]

## 2023-09-13 ENCOUNTER — OFFICE VISIT (OUTPATIENT)
Dept: DERMATOLOGY | Facility: CLINIC | Age: 58
End: 2023-09-13
Payer: COMMERCIAL

## 2023-09-13 DIAGNOSIS — L65.9 ALOPECIA: ICD-10-CM

## 2023-09-13 DIAGNOSIS — L66.10 LICHEN PLANOPILARIS: Primary | ICD-10-CM

## 2023-09-13 PROCEDURE — 11901 INJECT SKIN LESIONS >7: CPT | Performed by: PHYSICIAN ASSISTANT

## 2023-09-13 RX ORDER — FLUOCINOLONE ACETONIDE 0.11 MG/ML
OIL TOPICAL 2 TIMES DAILY
Qty: 118.28 ML | Refills: 4 | Status: SHIPPED | OUTPATIENT
Start: 2023-09-13

## 2023-09-13 NOTE — NURSING NOTE
Clinic Administered Medication Documentation        Patient was given Kenalog. Prior to medication administration, verified patient's identity using patient s name and date of birth. Please see MAR and medication order for additional information. Patient instructed to remain in clinic for 15 minutes and report any adverse reaction to staff immediately.    Vial/Syringe: Multi dose vial    Genna Montanez LPN

## 2023-09-13 NOTE — LETTER
9/13/2023         RE: Rimma Sarmiento  [address]  [place] [address]        Dear Colleague,    Thank you for referring your patient, Rimma Sarmiento, to the [facility]. Please see a copy of my visit note below.    [facility] Dermatology Note  Encounter Date: Sep 13, 2023  Office Visit      Dermatology Problem List:  1.  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 Patient noticing regrowth on the temples. Vertex scalp unchanged.   Pt reports improvement though hair is slow to come in. Has been following treatment regimen.  - ILK today - see below. We did increase the dosage today, will plan on lower dose next visit  - Continue OTC minoxidil 5% foam or solution once to twice daily - right now cost prohibitive, so she is not using. Reminded patient need to be consistent with this for best results  - Continue fluocinolone oil three times weekly, refilled  - Stop doxycycline 100 mg BID.   - Recommend Head & Shoulder Royal Oil shampoo  - For alopecia, will continue with vitamin D 2,000 IU    Procedures Performed:   - Intra-lesional triamcinolone procedure note. After verbal consent and review of risk of pain and skin thinning/atrophy, positioning and cleansing with isopropyl alcohol, 2 total mL of triamcinolone 10 mg/mL was injected into 25 lesion(s) on the scalp. The patient tolerated the procedure well and left the dermatology clinic in good condition.     Follow-up: 3 month(s) in-person, or earlier for new or changing lesions    Staff:     All risks, benefits and alternatives were discussed with patient.  Patient is in agreement and understands the assessment and plan.  All questions  were answered.    Merlyn Lee PA-C, MPAS  [facility] [place]: Phone: [phone], Fax: [phone]  [facility]: Phone: [phone],  Fax: [phone]  [facility] [place]: Phone: [phone], Fax: [phone]  ____________________________________________    CC: No chief complaint on file.      HPI:  Ms. Rimma Sarmiento is a 58 year old female who presents today as a return patient for hair loss. Notes no changes since last visit. Betty on the top of the head. No sx of the scalp. Has not been using minoxidil topically as it has been cost prohibitive.     Patient is otherwise feeling well, without additional concerns.    Labs:  none    Physical Exam:  Vitals: There were no vitals taken for this visit.  SKIN: Focused examination of face and scalp was performed.   - 1-2 cm fibers of hair growth on the frontal scalp.   - unchanged on vertex, and mavis temporal scalp  - No other lesions of concern on areas examined.     Medications:  Current Outpatient Medications   Medication     aspirin 81 MG EC tablet     atorvastatin (LIPITOR) 80 MG tablet     blood glucose (NO BRAND SPECIFIED) lancets standard     blood glucose (NO BRAND SPECIFIED) test strip     blood glucose (NO BRAND SPECIFIED) test strip     blood glucose monitoring (NO BRAND SPECIFIED) meter device kit     Blood Glucose Monitoring Suppl (FIFTY50 GLUCOSE METER 2.0) w/Device KIT     carboxymethylcellulose PF (CARBOXYMETHYLCELLULOSE SODIUM) 0.5 % ophthalmic solution     carvedilol (COREG) 25 MG tablet     clindamycin (CLINDAMAX) 1 % external gel     clopidogrel (PLAVIX) 75 MG tablet     doxycycline monohydrate (MONODOX) 100 MG capsule     escitalopram (LEXAPRO) 10 MG tablet     exenatide ER (BYDUREON BCISE) 2 MG/0.85ML auto-injector     Fluocinolone Acetonide Scalp (DERMA-SMOOTHE/FS SCALP) 0.01 % OIL oil     hydrochlorothiazide (HYDRODIURIL) 25 MG tablet      hydrOXYzine (ATARAX) 25 MG tablet     ibuprofen (ADVIL/MOTRIN) 200 MG tablet     insulin aspart (NOVOLOG FLEXPEN) 100 UNIT/ML pen     insulin aspart (NOVOLOG VIAL) 100 UNITS/ML vial     insulin glargine (LANTUS PEN) 100 UNIT/ML pen     insulin glargine (LANTUS PEN) 100 UNIT/ML pen     losartan (COZAAR) 100 MG tablet     nicotine (NICOTROL) 10 MG inhaler     pantoprazole (PROTONIX) 40 MG EC tablet     Vitamin D3 (CHOLECALCIFEROL) 25 mcg (1000 units) tablet     Vitamin D3 (CHOLECALCIFEROL) 25 mcg (1000 units) tablet     zolpidem (AMBIEN) 5 MG tablet     No current facility-administered medications for this visit.      Past Medical/Surgical History:   Patient Active Problem List   Diagnosis     Alopecia     Anomalous optic nerve (H)     Anxiety     Blurring of visual image     Decreased peripheral vision     Gastroesophageal reflux disease     Homonymous hemianopsia due to old cerebral infarction     Mixed hyperlipidemia     Essential hypertension     Hypertensive urgency     Impingement syndrome of right shoulder     Microalbuminuria     Morbid obesity (H)     Morbid obesity with BMI of 40.0-44.9, adult (H)     Nontraumatic complete tear of right rotator cuff     SUSIE (obstructive sleep apnea)     Other acute postprocedural pain     Pain of lower extremity     Recurrent major depressive episodes (H)     Routine general medical examination at a health care facility     Tobacco use disorder     Type 2 diabetes mellitus with hyperglycemia, with long-term current use of insulin (H)     Undersocialized conduct disorder, aggressive type     Ventricular tachycardia, non-sustained (H)     Vitamin D deficiency     History of colonic polyps     History of electrophysiologic study for VT which was not found on 9/20/2019     History of CVA (cerebrovascular accident)     Chronic diastolic heart failure (H)     Tobacco abuse counseling     Fatigue, unspecified type     Cerebral infarction (H)     Cerebrovascular accident (CVA) due  to bilateral embolism of posterior cerebral arteries (H)     CKD stage G2/A3, GFR 60-89 and albumin creatinine ratio >300 mg/g     Combined forms of age-related cataract of both eyes     Dry eyes, bilateral     Myopia of both eyes with astigmatism and presbyopia     Sensorineural hearing loss, bilateral     Past Medical History:   Diagnosis Date     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 MRI IMPRESSION: 1.  No acute intracranial abnormality. 2.  Normal MRI of the internal auditory canals and labyrinthine structures. 3.  Old infarctions in the left occipital lobe and left frontal centrum [place]ale     CKD stage G2/A3, GFR 60-89 and albumin creatinine ratio >300 mg/g 01/18/2022     Combined forms of age-related cataract of both eyes 02/23/2022    Last Assessment & Plan:  Formatting of this note might be different from the original. Mild bilateral cataracts. Not visually significant. - Monitor     cva 2014    right sided hemiparesis, resolved     Depression      Dizzy spells     hospitalized at [place] in the past     DM (diabetes mellitus) (H)      High cholesterol 09/17/2019     History of colonic polyps 09/24/2020 4/2/2019--hyperplastic x 1--repeat colonoscopy 5 years 6/29/2016--6 polyps, mix of tubular adenoma and hyperplastic polyps.     Hypertension      Mixed hyperlipidemia 03/10/2010     Morbid obesity (H) 01/27/2004     Sleep apnea     uses CPAP     Tobacco use disorder 10/03/2008     Type 2 diabetes mellitus (H) 10/23/2008     Type 2 diabetes mellitus with hyperglycemia, with long-term current use of insulin (H) 10/23/2008    Diabetes dx'd:      Last A1c: HGA1C      7.0   1/4/2013     Last Chol: CHOL      199   10/16/2012 TRIG      193   10/16/2012 HDL       39   10/16/2012 LDL      171   6/9/2011     Microalbumin:  ALBCR      2832   2/8/2012        GFR      >60   10/16/2012   CREAT     0.93   10/16/2012    Smoker:  YES.  Aspirin:     Yes     Last eye exam: Diabetes dx'd:      Last A1c: HGA1C      7.0   1/4/2013     [place]                        Again, thank you for allowing me to participate in the care of your patient.        Sincerely,        Merlyn Lee PA-C

## 2023-09-13 NOTE — NURSING NOTE
Rimma Sarmiento's goals for this visit include:   Chief Complaint   Patient presents with    Derm Problem     Hair loss, stable with improvement the sides of the scalp       She requests these members of her care team be copied on today's visit information: no    PCP: No Ref-Primary, Physician    Referring Provider:  No referring provider defined for this encounter.    There were no vitals taken for this visit.    Do you need any medication refills at today's visit? No    Genna Montanez LPN

## 2023-10-07 ENCOUNTER — HEALTH MAINTENANCE LETTER (OUTPATIENT)
Age: 58
End: 2023-10-07

## 2023-10-09 ENCOUNTER — THERAPY VISIT (OUTPATIENT)
Dept: OCCUPATIONAL THERAPY | Facility: CLINIC | Age: 58
End: 2023-10-09
Payer: COMMERCIAL

## 2023-10-09 DIAGNOSIS — H53.461 RIGHT HOMONYMOUS HEMIANOPSIA: Primary | ICD-10-CM

## 2023-10-09 PROCEDURE — 97165 OT EVAL LOW COMPLEX 30 MIN: CPT | Mod: GO

## 2023-10-09 PROCEDURE — 97535 SELF CARE MNGMENT TRAINING: CPT | Mod: GO

## 2023-10-09 NOTE — PROGRESS NOTES
OCCUPATIONAL THERAPY EVALUATION  Type of Visit: Evaluation    See electronic medical record for Abuse and Falls Screening details.    Subjective      Presenting condition or subjective complaint: vision loss R visual field  Date of onset:      Relevant medical history: Depression; Diabetes; Dizziness; Hearing problems; History of fractures; Mental Illness; Severe dizziness; Sleep disorder like apnea; Stroke   Dates & types of surgery: unsure, do not remember    Prior diagnostic imaging/testing results: MRI; CT scan; X-ray     Prior therapy history for the same diagnosis, illness or injury: Yes Reports having therapy in [place] but never for vision    Prior Level of Function  Transfers:  Mod I  Ambulation:  Mod I  ADL: I  IADL: Finances, Housekeeping, Laundry, Meal preparation, Medication management, Work    Living Environment  Social support: Alone   Type of home: Apartment/condo; Multi-level   Stairs to enter the home: No       Ramp: No   Stairs inside the home: No       Help at home: Home management tasks (cooking, cleaning); Medication and/or finances; Home and Yard maintenance tasks; Assist for driving and community activities  Equipment owned: Four-point cane     Employment: No    Hobbies/Interests: Reading    Patient goals for therapy: driving, reading    Pain assessment:  None stated      Objective   LOW VISION EVALUATION  ADDITIONAL HISTORY:  Current Responsibilities - IADLS:  Reports receiving assistance  home management tasks such as cooking,cleaning, and laundry    Others present at visit: None     COGNITIVE/BEHAVIORAL:  Communication: Intact  Cognitive Status: Oriented to person, place and time  Behavior: Appropriate  Patient/family aware of diagnosis: Yes  How well do you understand your eye condition? fairly well   Vision related restrictions on visiting with family/friends: Moderate  Reported emotion impact of visual impairment: Moderate  Reported adjustment to disability: good  Cognitive  Screen/Assessment:  Not needed       Physical Status/Equipment   Physical Status: Assistance with bathing transfers otherwise Mod I  Mobility Equipment Used: Cane (quad)  Bathing ADL Equipment Used:  None  Toileting ADL Equipment Used:  None    VISUAL REPORT:  Functional complaints: Homemaking, Leisure, Reading, Safety in mobility, Work related tasks, Writing  Visual Complaints: Constricted visual fields right eye, Constricted visual fields left eye, Visual fatigue, Double vision, phantom vision   Niall Bonnet Symptoms? Yes  Magnifiers and Low Vision AE owned:  None currently   Reading Glasses: Bifocal (lined)  Technology: Smart phone; is hacked     LIGHTING AND GLARE:  Is your lighting adequate? Yes at home  Is glare a problem? Yes outdoors  Are you satisfied with your sunglasses? Yes   Sunglass Details: Gray tint    VISUAL ACUITY:  Distance Acuity Right Eye: Per recent MD report  Distance Acuity Left Eye: Per recent MD report  Distance Acuity Both Eyes Together: Per recent MD report  Near Visual Acuity:  Unable to test due to time, per [place] able to read at 20/32    CONTRAST SENSITIVITY:  Contrast Sensitivity (score/25): 25/25, WNL    [place] Read  Smallest Print Size Read: .8M  Critical Print Size: 1.3 M  Words per minute at critical print size: 46 wpm    Visual Field:  Central Visual Field: Abnormal  Central Visual Field Screen Used: Clock Face  Peripheral Visual Field: Further testing recommended; will be completed next session  Peripheral Visual Field Screen Used: N/A; next session will conduct scancourse    Visual Attention: Appears normal      Assessment & Plan   CLINICAL IMPRESSIONS  Medical Diagnosis:      Treatment Diagnosis:      Impression/Assessment: Pt is a 58 year old female presenting to Occupational Therapy due to Right homoymous hemianopsia following a CVA.  The following significant findings have been identified:  decreased near acuity, impaired visual fields, light sensitivity, and impaired visual  scanning .  These identified deficits interfere with their ability to perform self care tasks, work tasks, recreational activities, driving , household mobility, community mobility, medication management, financial management, and meal planning and preparation as compared to previous level of function.     Clinical Decision Making (Complexity):  Assessment of Occupational Performance: 5 or more Performance Deficits  Occupational Performance Limitations: bathing/showering, functional mobility, driving and community mobility, health management and maintenance, home establishment and management, meal preparation and cleanup, Hoahaoism and spiritual activities and expression, leisure activities, and social participation  Clinical Decision Making (Complexity): Low complexity    PLAN OF CARE  Treatment Interventions:  Interventions: Self-Care/Home Management, Therapeutic Activity, Therapeutic Exercise    Long Term Goals          Frequency of Treatment:    Duration of Treatment:       Recommended Referrals to Other Professionals: None at this time  Education Assessment:       Risks and benefits of evaluation/treatment have been explained.   Patient/Family/caregiver agrees with Plan of Care.     Evaluation Time:         Signing Clinician: Bryn Wilson OT      [facility]                                                                                   OUTPATIENT OCCUPATIONAL THERAPY      PLAN OF TREATMENT FOR OUTPATIENT REHABILITATION   Patient's Last Name, First Name, Rimma Irene    YOB: 1965   Provider's Name   [facility]   Medical Record No.  [id number]     Onset Date:   Start of Care Date:       Medical Diagnosis:         OT Treatment Diagnosis:    Plan of Treatment  Frequency/Duration: /     Certification date from     To          See note for plan of treatment details and functional goals     Bryn Wilson OT                          I CERTIFY THE NEED FOR THESE SERVICES FURNISHED UNDER        THIS PLAN OF TREATMENT AND WHILE UNDER MY CARE     (Physician attestation of this document indicates review and certification of the therapy plan).                Referring Provider:  Pippa Livingston      Initial Assessment  See Epic Evaluation-

## 2023-10-30 ENCOUNTER — THERAPY VISIT (OUTPATIENT)
Dept: OCCUPATIONAL THERAPY | Facility: CLINIC | Age: 58
End: 2023-10-30
Payer: COMMERCIAL

## 2023-10-30 DIAGNOSIS — H53.461 RIGHT HOMONYMOUS HEMIANOPSIA: Primary | ICD-10-CM

## 2023-10-30 PROCEDURE — 97535 SELF CARE MNGMENT TRAINING: CPT | Mod: GO | Performed by: OCCUPATIONAL THERAPIST

## 2023-11-06 ENCOUNTER — THERAPY VISIT (OUTPATIENT)
Dept: OCCUPATIONAL THERAPY | Facility: CLINIC | Age: 58
End: 2023-11-06
Payer: COMMERCIAL

## 2023-11-06 DIAGNOSIS — H53.461 RIGHT HOMONYMOUS HEMIANOPSIA: Primary | ICD-10-CM

## 2023-11-06 PROCEDURE — 97535 SELF CARE MNGMENT TRAINING: CPT | Mod: GO

## 2023-11-27 ENCOUNTER — THERAPY VISIT (OUTPATIENT)
Dept: OCCUPATIONAL THERAPY | Facility: CLINIC | Age: 58
End: 2023-11-27
Payer: COMMERCIAL

## 2023-11-27 DIAGNOSIS — I63.433 CEREBROVASCULAR ACCIDENT (CVA) DUE TO BILATERAL EMBOLISM OF POSTERIOR CEREBRAL ARTERIES (H): Primary | ICD-10-CM

## 2023-11-27 PROCEDURE — 97535 SELF CARE MNGMENT TRAINING: CPT | Mod: GO | Performed by: OCCUPATIONAL THERAPIST

## 2024-01-05 ENCOUNTER — TELEPHONE (OUTPATIENT)
Dept: FAMILY MEDICINE | Facility: CLINIC | Age: 59
End: 2024-01-05
Payer: COMMERCIAL

## 2024-01-05 NOTE — TELEPHONE ENCOUNTER
Patient Quality Outreach    Patient is due for the following:   Breast Cancer Screening - Mammogram    Next Steps:   Schedule a Adult Preventative    Type of outreach:    Sent [employer] message.      Questions for provider review:    None           Andrea Behrend

## 2024-01-15 NOTE — TELEPHONE ENCOUNTER
Patient Quality Outreach    Patient is due for the following:   Breast Cancer Screening - Mammogram    Next Steps:   Patient was scheduled for a mammogram on 1/19/24 at [place].    Type of outreach:    Phone, spoke to patient/parent. patient      Questions for provider review:    None           Andrea Behrend

## 2024-01-19 ENCOUNTER — ANCILLARY PROCEDURE (OUTPATIENT)
Dept: MAMMOGRAPHY | Facility: HOSPITAL | Age: 59
End: 2024-01-19
Payer: COMMERCIAL

## 2024-01-19 DIAGNOSIS — Z12.31 VISIT FOR SCREENING MAMMOGRAM: ICD-10-CM

## 2024-01-19 PROCEDURE — 77063 BREAST TOMOSYNTHESIS BI: CPT

## 2024-01-31 ENCOUNTER — TELEPHONE (OUTPATIENT)
Dept: DERMATOLOGY | Facility: CLINIC | Age: 59
End: 2024-01-31

## 2024-01-31 NOTE — TELEPHONE ENCOUNTER
1/31 Called patient and left voicemail. Provided patient with [phone] as a call back number. Patient needs to reschedule ILK treatment with [facility]   [facility]ermat[facility], Surgery, Urology  [facility]

## 2024-01-31 NOTE — TELEPHONE ENCOUNTER
I scheduled Rimma for ILK injections with Jesus on 3/6 at 7:30. Clinic address provided.     Anastasiya MODI

## 2024-01-31 NOTE — TELEPHONE ENCOUNTER
[facility] Call Center    Phone Message    May a detailed message be left on voicemail: yes     Reason for Call: Other: Pt called and will need to r/s the appt she missed. Please call her back. Thanks      Action Taken: Message routed to:  Adult Clinics: Dermatology p [id number]    Travel Screening: Not Applicable

## 2024-02-20 ENCOUNTER — OFFICE VISIT (OUTPATIENT)
Dept: FAMILY MEDICINE | Facility: CLINIC | Age: 59
End: 2024-02-20
Payer: COMMERCIAL

## 2024-02-20 VITALS
RESPIRATION RATE: 14 BRPM | SYSTOLIC BLOOD PRESSURE: 130 MMHG | DIASTOLIC BLOOD PRESSURE: 86 MMHG | OXYGEN SATURATION: 99 % | BODY MASS INDEX: 38.64 KG/M2 | HEART RATE: 68 BPM | HEIGHT: 63 IN | TEMPERATURE: 99.2 F | WEIGHT: 218.1 LBS

## 2024-02-20 DIAGNOSIS — I10 ESSENTIAL HYPERTENSION: ICD-10-CM

## 2024-02-20 DIAGNOSIS — E11.69 TYPE 2 DIABETES MELLITUS WITH OTHER SPECIFIED COMPLICATION, WITH LONG-TERM CURRENT USE OF INSULIN (H): ICD-10-CM

## 2024-02-20 DIAGNOSIS — Z98.890 HISTORY OF ELECTROPHYSIOLOGIC STUDY: ICD-10-CM

## 2024-02-20 DIAGNOSIS — Z79.4 TYPE 2 DIABETES MELLITUS WITH OTHER SPECIFIED COMPLICATION, WITH LONG-TERM CURRENT USE OF INSULIN (H): ICD-10-CM

## 2024-02-20 DIAGNOSIS — F41.9 ANXIETY: ICD-10-CM

## 2024-02-20 DIAGNOSIS — I47.29 VENTRICULAR TACHYCARDIA, NON-SUSTAINED (H): ICD-10-CM

## 2024-02-20 DIAGNOSIS — N18.2 CKD STAGE G2/A3, GFR 60-89 AND ALBUMIN CREATININE RATIO >300 MG/G: ICD-10-CM

## 2024-02-20 DIAGNOSIS — Z23 NEED FOR PNEUMOCOCCAL VACCINE: ICD-10-CM

## 2024-02-20 DIAGNOSIS — E78.2 MIXED HYPERLIPIDEMIA: ICD-10-CM

## 2024-02-20 DIAGNOSIS — Z12.11 COLON CANCER SCREENING: ICD-10-CM

## 2024-02-20 DIAGNOSIS — L65.9 ALOPECIA: ICD-10-CM

## 2024-02-20 DIAGNOSIS — Z23 NEED FOR SHINGLES VACCINE: ICD-10-CM

## 2024-02-20 DIAGNOSIS — Z86.73 HISTORY OF CVA (CEREBROVASCULAR ACCIDENT): ICD-10-CM

## 2024-02-20 LAB
ALBUMIN SERPL BCG-MCNC: 4 G/DL (ref 3.5–5.2)
ALP SERPL-CCNC: 82 U/L (ref 40–150)
ALT SERPL W P-5'-P-CCNC: 21 U/L (ref 0–50)
ANION GAP SERPL CALCULATED.3IONS-SCNC: 12 MMOL/L (ref 7–15)
AST SERPL W P-5'-P-CCNC: 21 U/L (ref 0–45)
BILIRUB SERPL-MCNC: 0.2 MG/DL
BUN SERPL-MCNC: 17.1 MG/DL (ref 8–23)
CALCIUM SERPL-MCNC: 9.5 MG/DL (ref 8.6–10)
CHLORIDE SERPL-SCNC: 108 MMOL/L (ref 98–107)
CHOLEST SERPL-MCNC: 231 MG/DL
CREAT SERPL-MCNC: 0.9 MG/DL (ref 0.51–0.95)
CREAT UR-MCNC: 177 MG/DL
DEPRECATED HCO3 PLAS-SCNC: 21 MMOL/L (ref 22–29)
EGFRCR SERPLBLD CKD-EPI 2021: 73 ML/MIN/1.73M2
ERYTHROCYTE [DISTWIDTH] IN BLOOD BY AUTOMATED COUNT: 13 % (ref 10–15)
FASTING STATUS PATIENT QL REPORTED: YES
GLUCOSE SERPL-MCNC: 121 MG/DL (ref 70–99)
HBA1C MFR BLD: 6.9 % (ref 0–5.6)
HCT VFR BLD AUTO: 41.5 % (ref 35–47)
HDLC SERPL-MCNC: 55 MG/DL
HGB BLD-MCNC: 12.8 G/DL (ref 11.7–15.7)
LDLC SERPL CALC-MCNC: 155 MG/DL
MCH RBC QN AUTO: 24.8 PG (ref 26.5–33)
MCHC RBC AUTO-ENTMCNC: 30.8 G/DL (ref 31.5–36.5)
MCV RBC AUTO: 80 FL (ref 78–100)
MICROALBUMIN UR-MCNC: 2670 MG/L
MICROALBUMIN/CREAT UR: 1508.47 MG/G CR (ref 0–25)
NONHDLC SERPL-MCNC: 176 MG/DL
PLATELET # BLD AUTO: 356 10E3/UL (ref 150–450)
POTASSIUM SERPL-SCNC: 4.5 MMOL/L (ref 3.4–5.3)
PROT SERPL-MCNC: 6.7 G/DL (ref 6.4–8.3)
RBC # BLD AUTO: 5.16 10E6/UL (ref 3.8–5.2)
SODIUM SERPL-SCNC: 141 MMOL/L (ref 135–145)
TRIGL SERPL-MCNC: 107 MG/DL
WBC # BLD AUTO: 7.7 10E3/UL (ref 4–11)

## 2024-02-20 PROCEDURE — 80053 COMPREHEN METABOLIC PANEL: CPT | Performed by: NURSE PRACTITIONER

## 2024-02-20 PROCEDURE — 90677 PCV20 VACCINE IM: CPT | Performed by: NURSE PRACTITIONER

## 2024-02-20 PROCEDURE — 90472 IMMUNIZATION ADMIN EACH ADD: CPT | Performed by: NURSE PRACTITIONER

## 2024-02-20 PROCEDURE — 90471 IMMUNIZATION ADMIN: CPT | Performed by: NURSE PRACTITIONER

## 2024-02-20 PROCEDURE — 83036 HEMOGLOBIN GLYCOSYLATED A1C: CPT | Performed by: NURSE PRACTITIONER

## 2024-02-20 PROCEDURE — 85027 COMPLETE CBC AUTOMATED: CPT | Performed by: NURSE PRACTITIONER

## 2024-02-20 PROCEDURE — 82043 UR ALBUMIN QUANTITATIVE: CPT | Performed by: NURSE PRACTITIONER

## 2024-02-20 PROCEDURE — 36415 COLL VENOUS BLD VENIPUNCTURE: CPT | Performed by: NURSE PRACTITIONER

## 2024-02-20 PROCEDURE — 90750 HZV VACC RECOMBINANT IM: CPT | Performed by: NURSE PRACTITIONER

## 2024-02-20 PROCEDURE — 80061 LIPID PANEL: CPT | Performed by: NURSE PRACTITIONER

## 2024-02-20 PROCEDURE — 82570 ASSAY OF URINE CREATININE: CPT | Performed by: NURSE PRACTITIONER

## 2024-02-20 PROCEDURE — 99214 OFFICE O/P EST MOD 30 MIN: CPT | Mod: 25 | Performed by: NURSE PRACTITIONER

## 2024-02-20 RX ORDER — TRAZODONE HYDROCHLORIDE 50 MG/1
50-100 TABLET, FILM COATED ORAL
COMMUNITY
Start: 2022-01-17

## 2024-02-20 RX ORDER — VITAMIN B COMPLEX
TABLET ORAL
Qty: 90 TABLET | Refills: 1 | Status: SHIPPED | OUTPATIENT
Start: 2024-02-20 | End: 2024-05-21

## 2024-02-20 RX ORDER — CARVEDILOL 25 MG/1
12.5 TABLET ORAL DAILY
Qty: 180 TABLET | Refills: 3 | Status: SHIPPED | OUTPATIENT
Start: 2024-02-20

## 2024-02-20 RX ORDER — HYDROXYZINE HYDROCHLORIDE 25 MG/1
TABLET, FILM COATED ORAL
Qty: 30 TABLET | Refills: 3 | Status: SHIPPED | OUTPATIENT
Start: 2024-02-20 | End: 2024-07-17

## 2024-02-20 RX ORDER — LANOLIN ALCOHOL/MO/W.PET/CERES
3 CREAM (GRAM) TOPICAL
COMMUNITY
Start: 2022-02-07 | End: 2024-03-26

## 2024-02-20 RX ORDER — LOSARTAN POTASSIUM 25 MG/1
25 TABLET ORAL DAILY
Qty: 90 TABLET | Refills: 1 | Status: SHIPPED | OUTPATIENT
Start: 2024-02-20 | End: 2024-08-15

## 2024-02-20 RX ORDER — CARBOXYMETHYLCELLULOSE SODIUM 5 MG/ML
1 SOLUTION/ DROPS OPHTHALMIC
COMMUNITY
Start: 2022-02-23 | End: 2024-02-20

## 2024-02-20 RX ORDER — ESCITALOPRAM OXALATE 10 MG/1
TABLET ORAL
Qty: 90 TABLET | Refills: 3 | Status: SHIPPED | OUTPATIENT
Start: 2024-02-20

## 2024-02-20 RX ORDER — ATORVASTATIN CALCIUM 80 MG/1
80 TABLET, FILM COATED ORAL DAILY
Qty: 90 TABLET | Refills: 3 | Status: SHIPPED | OUTPATIENT
Start: 2024-02-20

## 2024-02-20 RX ORDER — CLOPIDOGREL BISULFATE 75 MG/1
75 TABLET ORAL DAILY
Qty: 90 TABLET | Refills: 3 | Status: SHIPPED | OUTPATIENT
Start: 2024-02-20

## 2024-02-20 RX ORDER — ASPIRIN 81 MG/1
81 TABLET ORAL DAILY
Qty: 90 TABLET | Refills: 3 | Status: SHIPPED | OUTPATIENT
Start: 2024-02-20

## 2024-02-20 RX ORDER — LANCETS
EACH MISCELLANEOUS
COMMUNITY
Start: 2023-07-27

## 2024-02-20 RX ORDER — INSULIN PUMP SYRINGE, 3 ML
EACH MISCELLANEOUS
Qty: 1 KIT | Refills: 0 | Status: SHIPPED | OUTPATIENT
Start: 2024-02-20

## 2024-02-20 ASSESSMENT — PATIENT HEALTH QUESTIONNAIRE - PHQ9
10. IF YOU CHECKED OFF ANY PROBLEMS, HOW DIFFICULT HAVE THESE PROBLEMS MADE IT FOR YOU TO DO YOUR WORK, TAKE CARE OF THINGS AT HOME, OR GET ALONG WITH OTHER PEOPLE: SOMEWHAT DIFFICULT
SUM OF ALL RESPONSES TO PHQ QUESTIONS 1-9: 10
SUM OF ALL RESPONSES TO PHQ QUESTIONS 1-9: 10

## 2024-02-20 ASSESSMENT — ANXIETY QUESTIONNAIRES
8. IF YOU CHECKED OFF ANY PROBLEMS, HOW DIFFICULT HAVE THESE MADE IT FOR YOU TO DO YOUR WORK, TAKE CARE OF THINGS AT HOME, OR GET ALONG WITH OTHER PEOPLE?: SOMEWHAT DIFFICULT
GAD7 TOTAL SCORE: 11
7. FEELING AFRAID AS IF SOMETHING AWFUL MIGHT HAPPEN: SEVERAL DAYS
7. FEELING AFRAID AS IF SOMETHING AWFUL MIGHT HAPPEN: SEVERAL DAYS
6. BECOMING EASILY ANNOYED OR IRRITABLE: MORE THAN HALF THE DAYS
IF YOU CHECKED OFF ANY PROBLEMS ON THIS QUESTIONNAIRE, HOW DIFFICULT HAVE THESE PROBLEMS MADE IT FOR YOU TO DO YOUR WORK, TAKE CARE OF THINGS AT HOME, OR GET ALONG WITH OTHER PEOPLE: SOMEWHAT DIFFICULT
GAD7 TOTAL SCORE: 11
4. TROUBLE RELAXING: SEVERAL DAYS
3. WORRYING TOO MUCH ABOUT DIFFERENT THINGS: MORE THAN HALF THE DAYS
GAD7 TOTAL SCORE: 11
1. FEELING NERVOUS, ANXIOUS, OR ON EDGE: SEVERAL DAYS
2. NOT BEING ABLE TO STOP OR CONTROL WORRYING: MORE THAN HALF THE DAYS
5. BEING SO RESTLESS THAT IT IS HARD TO SIT STILL: MORE THAN HALF THE DAYS

## 2024-02-20 ASSESSMENT — PAIN SCALES - GENERAL: PAINLEVEL: NO PAIN (0)

## 2024-02-20 NOTE — LETTER
March 14, 2024      Rimma Sarmiento  [address]  [place] [address]        Dear ,    We are writing to inform you of your test results.      Karyn Castanoley,  Blood work results are back.  1. Kidneys continue to leak high protein - I will send a referral to the kidney doctor for further evaluation and recommendations.     2. Cholesterol is high- now that we re-started your medications hopefully will get better. I want to see you in about 6 weeks for your follow up annual exam and will repeat cholesterol levels     3. Diabetes is well controlled- continue diet and exercise as you have been doing. Will continue same medication as prior but no insulin. If you experience low blood sugar let me know right away.        Thank you     Aminata Trujillo, CNP    Resulted Orders   Lipid panel reflex to direct LDL Non-fasting   Result Value Ref Range    Cholesterol 231 (H) <200 mg/dL    Triglycerides 107 <150 mg/dL    Direct Measure HDL 55 >=50 mg/dL    LDL Cholesterol Calculated 155 (H) <=100 mg/dL    Non HDL Cholesterol 176 (H) <130 mg/dL    Patient Fasting > 8hrs? Yes     Narrative    Cholesterol  Desirable:  <200 mg/dL    Triglycerides  Normal:  Less than 150 mg/dL  Borderline High:  150-199 mg/dL  High:  200-499 mg/dL  Very High:  Greater than or equal to 500 mg/dL    Direct Measure HDL  Female:  Greater than or equal to 50 mg/dL   Male:  Greater than or equal to 40 mg/dL    LDL Cholesterol  Desirable:  <100mg/dL  Above Desirable:  100-129 mg/dL   Borderline High:  130-159 mg/dL   High:  160-189 mg/dL   Very High:  >= 190 mg/dL    Non HDL Cholesterol  Desirable:  130 mg/dL  Above Desirable:  130-159 mg/dL  Borderline High:  160-189 mg/dL  High:  190-219 mg/dL  Very High:  Greater than or equal to 220 mg/dL   CBC with Platelets   Result Value Ref Range    WBC Count 7.7 4.0 - 11.0 10e3/uL    RBC Count 5.16 3.80 - 5.20 10e6/uL    Hemoglobin 12.8 11.7 - 15.7 g/dL    Hematocrit 41.5 35.0 - 47.0 %     MCV 80 78 - 100 fL    MCH 24.8 (L) 26.5 - 33.0 pg    MCHC 30.8 (L) 31.5 - 36.5 g/dL    RDW 13.0 10.0 - 15.0 %    Platelet Count 356 150 - 450 10e3/uL   HEMOGLOBIN A1C   Result Value Ref Range    Hemoglobin A1C 6.9 (H) 0.0 - 5.6 %      Comment:      Normal <5.7%   Prediabetes 5.7-6.4%    Diabetes 6.5% or higher     Note: Adopted from ADA consensus guidelines.   Comprehensive metabolic panel (BMP + Alb, Alk Phos, ALT, AST, Total. Bili, TP)   Result Value Ref Range    Sodium 141 135 - 145 mmol/L      Comment:      Reference intervals for this test were updated on 09/26/2023 to more accurately reflect our healthy population. There may be differences in the flagging of prior results with similar values performed with this method. Interpretation of those prior results can be made in the context of the updated reference intervals.     Potassium 4.5 3.4 - 5.3 mmol/L    Carbon Dioxide (CO2) 21 (L) 22 - 29 mmol/L    Anion Gap 12 7 - 15 mmol/L    Urea Nitrogen 17.1 8.0 - 23.0 mg/dL    Creatinine 0.90 0.51 - 0.95 mg/dL    GFR Estimate 73 >60 mL/min/1.73m2    Calcium 9.5 8.6 - 10.0 mg/dL    Chloride 108 (H) 98 - 107 mmol/L    Glucose 121 (H) 70 - 99 mg/dL    Alkaline Phosphatase 82 40 - 150 U/L      Comment:      Reference intervals for this test were updated on 11/14/2023 to more accurately reflect our healthy population. There may be differences in the flagging of prior results with similar values performed with this method. Interpretation of those prior results can be made in the context of the updated reference intervals.    AST 21 0 - 45 U/L      Comment:      Reference intervals for this test were updated on 6/12/2023 to more accurately reflect our healthy population. There may be differences in the flagging of prior results with similar values performed with this method. Interpretation of those prior results can be made in the context of the updated reference intervals.    ALT 21 0 - 50 U/L      Comment:      Reference  intervals for this test were updated on 6/12/2023 to more accurately reflect our healthy population. There may be differences in the flagging of prior results with similar values performed with this method. Interpretation of those prior results can be made in the context of the updated reference intervals.      Protein Total 6.7 6.4 - 8.3 g/dL    Albumin 4.0 3.5 - 5.2 g/dL    Bilirubin Total 0.2 <=1.2 mg/dL   Albumin Random Urine Quantitative with Creat Ratio   Result Value Ref Range    Creatinine Urine mg/dL 177.0 mg/dL      Comment:      The reference ranges have not been established in urine creatinine. The results should be integrated into the clinical context for interpretation.    Albumin Urine mg/L 2,670.0 mg/L      Comment:      The reference ranges have not been established in urine albumin. The results should be integrated into the clinical context for interpretation.    Albumin Urine mg/g Cr 1,508.47 (H) 0.00 - 25.00 mg/g Cr      Comment:      Microalbuminuria is defined as an albumin:creatinine ratio of 17 to 299 for males and 25 to 299 for females. A ratio of albumin:creatinine of 300 or higher is indicative of overt proteinuria.  Due to biologic variability, positive results should be confirmed by a second, first-morning random or 24-hour timed urine specimen. If there is discrepancy, a third specimen is recommended. When 2 out of 3 results are in the microalbuminuria range, this is evidence for incipient nephropathy and warrants increased efforts at glucose control, blood pressure control, and institution of therapy with an angiotensin-converting-enzyme (ACE) inhibitor (if the patient can tolerate it).         If you have any questions or concerns, please call the clinic at the number listed above.       Sincerely,      JANY Riggs CNP

## 2024-02-20 NOTE — PROGRESS NOTES
Assessment & Plan     Type 2 diabetes mellitus with other specified complication, with long-term current use of insulin (H)  - CBC with Platelets  - HEMOGLOBIN A1C  - blood glucose monitoring (SOFTCLIX) lancets  - Adult Diabetes Education  Referral  - aspirin 81 MG EC tablet  Dispense: 90 tablet; Refill: 3  - atorvastatin (LIPITOR) 80 MG tablet  Dispense: 90 tablet; Refill: 3  - blood glucose (NO BRAND SPECIFIED) test strip  Dispense: 300 strip; Refill: 3  - blood glucose (NO BRAND SPECIFIED) lancets standard  Dispense: 100 each; Refill: 0  - blood glucose monitoring (NO BRAND SPECIFIED) meter device kit  Dispense: 1 kit; Refill: 0  - Blood Glucose Monitoring Suppl (FIFTY50 GLUCOSE METER 2.0) w/Device KIT  Dispense: 1 kit; Refill: 0  - exenatide ER (BYDUREON BCISE) 2 MG/0.85ML auto-injector  Dispense: 6 mL; Refill: 1  - CBC with Platelets  - HEMOGLOBIN A1C    CKD stage G2/A3, GFR 60-89 and albumin creatinine ratio >300 mg/g  - Comprehensive metabolic panel (BMP + Alb, Alk Phos, ALT, AST, Total. Bili, TP)  - Albumin Random Urine Quantitative with Creat Ratio  - Comprehensive metabolic panel (BMP + Alb, Alk Phos, ALT, AST, Total. Bili, TP)  - Albumin Random Urine Quantitative with Creat Ratio  - Adult Nephrology  Referral    Essential hypertension  Will stop hydrochlorothiazide as /86 .Given hx of CKD, CVA, and V tach re-start Coreg and Losartan but lower dosage. BP goal <130/80  - carvedilol (COREG) 25 MG tablet -take 0.5 tablet (12.5mg) daily Dispense: 180 tablet; Refill: 3  - losartan (COZAAR) 25 MG tablet  Dispense: 90 tablet; Refill: 1  - Med Therapy Management Referral    Mixed hyperlipidemia  - Lipid panel reflex to direct LDL Non-fasting  - atorvastatin (LIPITOR) 80 MG tablet  Dispense: 90 tablet; Refill: 3  - Lipid panel reflex to direct LDL Non-fasting    Ventricular tachycardia, non-sustained (H)  - carvedilol (COREG) 25 MG tablet  Dispense: 180 tablet; Refill: 3    History of  "electrophysiologic study for VT which was not found on 9/20/2019  - carvedilol (COREG) 25 MG tablet  Dispense: 180 tablet; Refill: 3    Alopecia  - Vitamin D3 (CHOLECALCIFEROL) 25 mcg (1000 units) tablet  Dispense: 90 tablet; Refill: 1    History of CVA (cerebrovascular accident)  On high statin therapy, and aspirin  On Coreg and losartan to control BP goal <130/80  - clopidogrel (PLAVIX) 75 MG tablet  Dispense: 90 tablet; Refill: 3  - Med Therapy Management Referral    Need for shingles vaccine  - ZOSTER RECOMBINANT ADJUVANTED (SHINGRIX)    Need for pneumococcal vaccine  - Pneumococcal 20 Valent Conjugate (Prevnar 20)    Anxiety  - escitalopram (LEXAPRO) 10 MG tablet  Dispense: 90 tablet; Refill: 3  - hydrOXYzine HCl (ATARAX) 25 MG tablet  Dispense: 30 tablet; Refill: 3    Colon cancer screening  - Colonoscopy Screening  Referral              Nicotine/Tobacco Cessation  She reports that she has been smoking cigarettes. She has been smoking an average of 0.3 packs per day. She has never used smokeless tobacco.  Nicotine/Tobacco Cessation Plan  Information offered: Patient not interested at this time      BMI  Estimated body mass index is 38.64 kg/m  as calculated from the following:    Height as of this encounter: 1.6 m (5' 2.99\").    Weight as of this encounter: 98.9 kg (218 lb 1.6 oz).   Weight management plan: Discussed healthy diet and exercise guidelines          Manuel Shanks is a 58 year old, presenting for the following health issues:  Establish Care, Diabetes (Checks blood sugars once a day in the morning. Pt reports would be interested in a blood sugar monitor and she reports not liking sticking herself. Pt would like to discuss with Diabetic Educator - referral pended ), and Recheck Medication (Please review and que appropriate refills. Needs to recheck and refill medications. Pt reports not knowing what medications she needs to take or that need to be refilled. Pt reports not seeing a " "provider in a year. )        2/20/2024     9:53 AM   Additional Questions   Roomed by Aleshia     Has not been on any medications for 1 year. Overall doing well. Has been dieting and walking to prevent high blood sugars. Not sure why her medications were not being refilled. She thought she has established care but not getting her medications. Hx of stroke and is scared of having another stroke. No stroke residuals.       History of Present Illness       Reason for visit:  Establish    She eats 2-3 servings of fruits and vegetables daily.She consumes 2 sweetened beverage(s) daily.She exercises with enough effort to increase her heart rate 10 to 19 minutes per day.  She exercises with enough effort to increase her heart rate 3 or less days per week. She is missing 1 dose(s) of medications per week.                     Objective    /86 (BP Location: Left arm, Patient Position: Sitting, Cuff Size: Adult Regular)   Pulse 68   Temp 99.2  F (37.3  C) (Tympanic)   Resp 14   Ht 1.6 m (5' 2.99\")   Wt 98.9 kg (218 lb 1.6 oz)   LMP  (LMP Unknown)   SpO2 99%   BMI 38.64 kg/m    Body mass index is 38.64 kg/m .  Physical Exam               Signed Electronically by: JANY Riggs CNP    "

## 2024-02-20 NOTE — COMMUNITY RESOURCES LIST (ENGLISH)
02/20/2024   [employer]  N/A  For questions about this resource list or additional care needs, please contact your primary care clinic or care manager.  Phone: [phone]   Email: N/A   Address: [address] [place] [id number]   Hours: N/A        Food and Nutrition       Food pantry  1  [facility] Distance: 0.14 miles      Pickup   [address] [place] [address]  Language: English  Hours: Mon - Fri 12:00 PM - 1:00 PM  Fees: Free   Phone: (525) 365-3145 Email: info@[facility] [facility] [facility].My[employer] Website: https://www.[facility].MyFit/locations/_[place]_ymca     2  [facility] Distance: 0.15 miles      In-Person, Delivery   [address] [place] [address]  Language: English, Kazakh  Hours: Mon - Fri 10:00 AM - 12:00 PM , Mon - Tue 1:30 PM - 3:30 PM , Wed 5:00 PM - 7:00 PM , Thu - Fri 1:30 PM - 3:30 PM  Fees: Free   Phone: (477) 410-2707 Email: info@[employer] Website: https://[employer].MyFit/food-shelves/     SNAP application assistance  3  [employer] [facility] Distance: 1.2 miles      Phone/Virtual   [address] [address]  Language: English  Hours: Mon - Fri 8:00 AM - 4:30 PM  Fees: Free   Phone: (964) 622-6913 Email: info@[employer].org Website: http://www.caprw.org/     4  [facility] - SNAP Ed - SNAP Ed - SNAP application assistance Distance: 1.94 miles      In-Person   [address] [place] [address]  Language: English, Hmong, Oromo, Sammarinese, Kazakh  Hours: Mon - Fri 9:00 AM - 5:00 PM Appt. Only  Fees: Free   Phone: (107) 150-4304 Email: parmindert@[facility] Website: https://extension.[facility]/nutrition-education/snap-ed-educational-offerings     Soup kitchen or free meals  5  [employer] [place] Distance: 0.5 miles      Pickup   [address] [place] [address]  Language: English  Hours: Mon 12:00 PM - 2:00 PM ,  Wed 12:00 PM - 2:00 PM  Fees: Free   Phone: (192) 707-4715 Ext.4 Email: info@[employer] Website: http://www.[employer]     6  [facility] [place] [facility] - Free Summer Meals Distance: 1.62 miles      In-Person   [address] [place] [address]  Language: English  Hours: Mon - Fri 12:00 PM - 1:00 PM , Mon - Fri 3:00 PM - 4:00 PM  Fees: Free   Phone: (863) 724-7009 Email: dave@.[facility]. Website: https://www.[facility]/departments/judd-recreation/[facility]          Transportation       Free or low-cost transportation  7  [facility] Distance: 1.08 miles      In-Person   [address] [place] [address]  Language: English, Yi  Hours: Mon 9:00 AM - 5:00 PM , Tue 9:30 AM - 7:00 PM , Wed 9:00 AM - 5:00 PM , Thu 9:30 AM - 7:00 PM , Fri 9:00 AM - 5:00 PM  Fees: Free   Phone: (818) 614-4307 Email: kai@[employer] Website: http://www.[employer]     8  [facility] Distance: 4.18 miles      In-Person   [address] [place] [address]  Language: English  Hours: Mon - Fri 8:00 AM - 3:00 PM  Fees: Free   Phone: (292) 241-4070 Ext.14 Email: gloria@[facility].[employer] Website: http://www.[facility].org     Transportation to medical appointments  9  [facility] - Non-Emergency Medical Transportation Distance: 3.59 miles      In-Person   [address] [place] [address]  Language: English  Hours: Mon - Fri 8:00 AM - 4:00 PM Appt. Only  Fees: Self Pay   Phone: (485) 341-6133 Website: http://www.[employer].org/Medical-Services/Emergency-medical-services/Non-emergency-transportation/     10  [employer] Distance: 5.18 miles      In-Person   [address] [address]  Language: English  Hours: Mon - Thu 6:00 AM - 6:00 PM , Fri 6:00 AM - 5:00 PM  Fees: Insurance, Self Pay   Phone: (793) 267-8536 Email: office@[employer] Website:  https://www.[employer]/          Important Numbers & Websites       Emergency Services   911  [facility] Services   311  Poison Control   (524) 279-4259  Suicide Prevention Lifeline   (489) 805-2634 (TALK)  Child Abuse Hotline   (716) 895-9061 (4-A-Child)  Sexual Assault Hotline   (195) 199-2142 (HOPE)  National Runaway Safeline   (625) 204-2848 (RUNAWAY)  All-Options Talkline   (980) 327-3040  Substance Abuse Referral   (205) 802-4353 (HELP)

## 2024-02-22 ENCOUNTER — TELEPHONE (OUTPATIENT)
Dept: FAMILY MEDICINE | Facility: CLINIC | Age: 59
End: 2024-02-22
Payer: COMMERCIAL

## 2024-02-22 NOTE — TELEPHONE ENCOUNTER
[facility] referral from: [facility] visit (referral by provider)    [facility] referral outreach attempt #2 on February 22, 2024 at 9:54 AM      Outcome: Patient not reachable after several attempts, will route to [facility] Pharmacist/Provider as an FYI.  [facility] scheduling number is .  Thank you for the referral.    Use amalia dodd for the carrier/Plan on the flowsheet      Chilltime Message Sent    Radha Sousa - [facility]    [phone]

## 2024-03-01 ENCOUNTER — TRANSFERRED RECORDS (OUTPATIENT)
Dept: HEALTH INFORMATION MANAGEMENT | Facility: CLINIC | Age: 59
End: 2024-03-01
Payer: COMMERCIAL

## 2024-03-06 ENCOUNTER — OFFICE VISIT (OUTPATIENT)
Dept: DERMATOLOGY | Facility: CLINIC | Age: 59
End: 2024-03-06
Payer: COMMERCIAL

## 2024-03-06 DIAGNOSIS — L65.9 LOSS OF HAIR: ICD-10-CM

## 2024-03-06 DIAGNOSIS — E55.9 HYPOVITAMINOSIS D: ICD-10-CM

## 2024-03-06 DIAGNOSIS — L66.10 LICHEN PLANOPILARIS: ICD-10-CM

## 2024-03-06 DIAGNOSIS — L65.9 ALOPECIA: Primary | ICD-10-CM

## 2024-03-06 PROCEDURE — 82306 VITAMIN D 25 HYDROXY: CPT | Performed by: PHYSICIAN ASSISTANT

## 2024-03-06 PROCEDURE — 36415 COLL VENOUS BLD VENIPUNCTURE: CPT | Performed by: PHYSICIAN ASSISTANT

## 2024-03-06 PROCEDURE — 11901 INJECT SKIN LESIONS >7: CPT | Performed by: PHYSICIAN ASSISTANT

## 2024-03-06 PROCEDURE — 99214 OFFICE O/P EST MOD 30 MIN: CPT | Mod: 25 | Performed by: PHYSICIAN ASSISTANT

## 2024-03-06 NOTE — LETTER
3/6/2024         RE: Rimma Sarmiento  [address]  [place] [address]        Dear Colleague,    Thank you for referring your patient, Rimma Sarmiento, to the [facility]. Please see a copy of my visit note below.    [facility] Dermatology Note  Encounter Date: Mar 6, 2024  Office Visit      Dermatology Problem List:  1.  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 Patient noticing regrowth on the temples. Vertex scalp unchanged.   Pt reports improvement though hair is slow to come in. Has been following treatment regimen.  - ILK 5 today - see below.   - Continue OTC minoxidil 5% foam or solution once to twice daily - right now cost prohibitive, so she is not using. Reminded patient need to be consistent with this for best results  - Continue fluocinolone oil three times weekly, refilled  - Recommend Head & Shoulder Royal Oil shampoo  -In the last year she had not taken any Vitamin D despite it being recommend to her by Dr. Naqvi, she now has her Rx for Vit D but has not started it. Since it has been so long, will recheck Vit D today and ensure it is still appropriate for her to restart the Vit D supplementation.   - Will check her Vitamin D level and if it is low she will get back to take Vitamin D 4,000 units daily.     Procedures Performed:   - Intra-lesional triamcinolone procedure note. After verbal consent and review of risk of pain and skin thinning/atrophy, positioning and cleansing with isopropyl alcohol, 2 total mL of triamcinolone 5 mg/mL was injected into 25 lesion(s) on the scalp. The patient tolerated the procedure well and left the dermatology clinic in good condition.     Follow-up: 3 month(s) in-person, or  earlier for new or changing lesions    Staff and scribe     I, Kayla Calderon, am serving as a scribe to document services personally performed by Merlyn Lee PA-C based on data collection and the provider's statements to me.      All risks, benefits and alternatives were discussed with patient.  Patient is in agreement and understands the assessment and plan.  All questions were answered.    Merlyn Lee PA-C, [facility]  [facility]: Phone: [phone], Fax: [phone]  [facility]: Phone: [phone],  Fax: [phone]  [facility]: Phone: [phone], Fax: [phone]  ____________________________________________    CC: No chief complaint on file.      HPI:  Ms. Rimma Sarmiento is a 58 year old female who presents today as a return patient for hair loss. No m ajor changes to hair. No scalp sx. Patient has not taken or started taking her Vit D. Has it now in her possession but has not started it.    Patient is otherwise feeling well, without additional concerns.    Labs:  none    Physical Exam:  Vitals: LMP  (LMP Unknown)   SKIN: Focused examination of face and scalp was performed.   - 1-2 cm fibers of hair growth on the frontal scalp.   - unchanged on vertex, and mavis temporal scalp  - No other lesions of concern on areas examined.                     Medications:  Current Outpatient Medications   Medication     aspirin 81 MG EC tablet     atorvastatin (LIPITOR) 80 MG tablet     blood glucose (NO BRAND SPECIFIED) lancets standard     blood glucose (NO BRAND SPECIFIED) test strip     blood glucose (NO BRAND SPECIFIED) test strip     blood glucose monitoring (NO BRAND SPECIFIED) meter device kit     blood glucose monitoring (SOFTCLIX) lancets     Blood Glucose Monitoring Suppl (FIFTY50 GLUCOSE METER 2.0) w/Device KIT     carboxymethylcellulose PF (CARBOXYMETHYLCELLULOSE SODIUM) 0.5 % ophthalmic  solution     carvedilol (COREG) 25 MG tablet     clopidogrel (PLAVIX) 75 MG tablet     diclofenac (VOLTAREN) 1 % topical gel     escitalopram (LEXAPRO) 10 MG tablet     exenatide ER (BYDUREON BCISE) 2 MG/0.85ML auto-injector     fluocinolone acetonide (DERMA SMOOTHE/FS BODY) 0.01 % external oil     Fluocinolone Acetonide Scalp (DERMA-SMOOTHE/FS SCALP) 0.01 % OIL oil     hydrOXYzine HCl (ATARAX) 25 MG tablet     ibuprofen (ADVIL/MOTRIN) 200 MG tablet     losartan (COZAAR) 25 MG tablet     melatonin 3 MG tablet     nicotine (NICOTROL) 10 MG inhaler     pantoprazole (PROTONIX) 40 MG EC tablet     traZODone (DESYREL) 50 MG tablet     Vitamin D3 (CHOLECALCIFEROL) 25 mcg (1000 units) tablet     No current facility-administered medications for this visit.      Past Medical/Surgical History:   Patient Active Problem List   Diagnosis     Alopecia     Anomalous optic nerve (H)     Anxiety     Blurring of visual image     Decreased peripheral vision     Gastroesophageal reflux disease     Homonymous hemianopsia due to old cerebral infarction     Mixed hyperlipidemia     Essential hypertension     Hypertensive urgency     Impingement syndrome of right shoulder     Microalbuminuria     Morbid obesity (H)     Morbid obesity with BMI of 40.0-44.9, adult (H)     Nontraumatic complete tear of right rotator cuff     SUSIE (obstructive sleep apnea)     Other acute postprocedural pain     Pain of lower extremity     Recurrent major depressive episodes (H24)     Routine general medical examination at a health care facility     Tobacco use disorder     Type 2 diabetes mellitus with hyperglycemia, with long-term current use of insulin (H)     Undersocialized conduct disorder, aggressive type     Ventricular tachycardia, non-sustained (H)     Vitamin D deficiency     History of colonic polyps     History of electrophysiologic study for VT which was not found on 9/20/2019     History of CVA (cerebrovascular accident)     Chronic diastolic  heart failure (H)     Tobacco abuse counseling     Fatigue, unspecified type     Cerebral infarction (H)     Cerebrovascular accident (CVA) due to bilateral embolism of posterior cerebral arteries (H)     CKD stage G2/A3, GFR 60-89 and albumin creatinine ratio >300 mg/g     Combined forms of age-related cataract of both eyes     Dry eyes, bilateral     Myopia of both eyes with astigmatism and presbyopia     Sensorineural hearing loss, bilateral     Past Medical History:   Diagnosis Date     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 MRI IMPRESSION: 1.  No acute intracranial abnormality. 2.  Normal MRI of the internal auditory canals and labyrinthine structures. 3.  Old infarctions in the left occipital lobe and left frontal centrum Semi[place]ale     CKD stage G2/A3, GFR 60-89 and albumin creatinine ratio >300 mg/g 01/18/2022     Combined forms of age-related cataract of both eyes 02/23/2022    Last Assessment & Plan:  Formatting of this note might be different from the original. Mild bilateral cataracts. Not visually significant. - Monitor     cva 2014    right sided hemiparesis, resolved     Depression      Dizzy spells     hospitalized at [facility] in the past     DM (diabetes mellitus) (H)      High cholesterol 09/17/2019     History of colonic polyps 09/24/2020 4/2/2019--hyperplastic x 1--repeat colonoscopy 5 years 6/29/2016--6 polyps, mix of tubular adenoma and hyperplastic polyps.     Hypertension      Mixed hyperlipidemia 03/10/2010     Morbid obesity (H) 01/27/2004     Sleep apnea     uses CPAP     Tobacco use disorder 10/03/2008     Type 2 diabetes mellitus (H) 10/23/2008     Type 2 diabetes mellitus with hyperglycemia, with long-term current use of insulin (H) 10/23/2008    Diabetes dx'd:      Last A1c: HGA1C      7.0   1/4/2013      Last Chol: CHOL      199   10/16/2012 TRIG      193   10/16/2012 HDL       39   10/16/2012 LDL      171   6/9/2011     Microalbumin:  ALBCR     2832   2/8/2012        GFR      >60   10/16/2012   CREAT     0.93   10/16/2012    Smoker:  YES.  Aspirin:     Yes     Last eye exam: Diabetes dx'd:      Last A1c: HGA1C      7.0   1/4/2013     [place]                        Again, thank you for allowing me to participate in the care of your patient.        Sincerely,        Merlyn Lee PA-C   Refer to the Assessment tab to view/cancel completed assessment.

## 2024-03-06 NOTE — NURSING NOTE
Rimma Sarmiento's goals for this visit include:   Chief Complaint   Patient presents with    Hair Loss     Patient reports hair is growing, but slowly. Patient recently started using topical Minoxidil.        She requests these members of her care team be copied on today's visit information:     PCP: No Ref-Primary, Physician    Referring Provider:  No referring provider defined for this encounter.    LMP  (LMP Unknown)     Do you need any medication refills at today's visit?     Eveline Dunn on 3/6/2024 at 7:41 AM

## 2024-03-06 NOTE — PROGRESS NOTES
[facility] Dermatology Note  Encounter Date: Mar 6, 2024  Office Visit      Dermatology Problem List:  1.  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 Patient noticing regrowth on the temples. Vertex scalp unchanged.   Pt reports improvement though hair is slow to come in. Has been following treatment regimen.  - ILK 5 today - see below.   - Continue OTC minoxidil 5% foam or solution once to twice daily - right now cost prohibitive, so she is not using. Reminded patient need to be consistent with this for best results  - Continue fluocinolone oil three times weekly, refilled  - Recommend Head & Shoulder Royal Oil shampoo  -In the last year she had not taken any Vitamin D despite it being recommend to her by Dr. Naqvi, she now has her Rx for Vit D but has not started it. Since it has been so long, will recheck Vit D today and ensure it is still appropriate for her to restart the Vit D supplementation.   - Will check her Vitamin D level and if it is low she will get back to take Vitamin D 4,000 units daily.     Procedures Performed:   - Intra-lesional triamcinolone procedure note. After verbal consent and review of risk of pain and skin thinning/atrophy, positioning and cleansing with isopropyl alcohol, 2 total mL of triamcinolone 5 mg/mL was injected into 25 lesion(s) on the scalp. The patient tolerated the procedure well and left the dermatology clinic in good condition.     Follow-up: 3 month(s) in-person, or earlier for new or changing lesions    Staff and scribe     CACHORRO, Kayla Calderon, am serving as a scribe to document services personally performed by Merlyn Lee PA-C based on data collection and the provider's statements to me.      All risks, benefits and  alternatives were discussed with patient.  Patient is in agreement and understands the assessment and plan.  All questions were answered.    Merlyn Lee PA-C, MPAS  [facility]: Phone: [phone], Fax: [phone]  [facility]: Phone: [phone],  Fax: [phone]  [facility]: Phone: [phone], Fax: [phone]  ____________________________________________    CC: No chief complaint on file.      HPI:  Ms. Rimma Sarmiento is a 58 year old female who presents today as a return patient for hair loss. No m ajor changes to hair. No scalp sx. Patient has not taken or started taking her Vit D. Has it now in her possession but has not started it.    Patient is otherwise feeling well, without additional concerns.    Labs:  none    Physical Exam:  Vitals: LMP  (LMP Unknown)   SKIN: Focused examination of face and scalp was performed.   - 1-2 cm fibers of hair growth on the frontal scalp.   - unchanged on vertex, and mavis temporal scalp  - No other lesions of concern on areas examined.                     Medications:  Current Outpatient Medications   Medication    aspirin 81 MG EC tablet    atorvastatin (LIPITOR) 80 MG tablet    blood glucose (NO BRAND SPECIFIED) lancets standard    blood glucose (NO BRAND SPECIFIED) test strip    blood glucose (NO BRAND SPECIFIED) test strip    blood glucose monitoring (NO BRAND SPECIFIED) meter device kit    blood glucose monitoring (SOFTCLIX) lancets    Blood Glucose Monitoring Suppl (FIFTY50 GLUCOSE METER 2.0) w/Device KIT    carboxymethylcellulose PF (CARBOXYMETHYLCELLULOSE SODIUM) 0.5 % ophthalmic solution    carvedilol (COREG) 25 MG tablet    clopidogrel (PLAVIX) 75 MG tablet    diclofenac (VOLTAREN) 1 % topical gel    escitalopram (LEXAPRO) 10 MG tablet    exenatide ER (BYDUREON BCISE) 2 MG/0.85ML auto-injector    fluocinolone acetonide (DERMA SMOOTHE/FS BODY)  0.01 % external oil    Fluocinolone Acetonide Scalp (DERMA-SMOOTHE/FS SCALP) 0.01 % OIL oil    hydrOXYzine HCl (ATARAX) 25 MG tablet    ibuprofen (ADVIL/MOTRIN) 200 MG tablet    losartan (COZAAR) 25 MG tablet    melatonin 3 MG tablet    nicotine (NICOTROL) 10 MG inhaler    pantoprazole (PROTONIX) 40 MG EC tablet    traZODone (DESYREL) 50 MG tablet    Vitamin D3 (CHOLECALCIFEROL) 25 mcg (1000 units) tablet     No current facility-administered medications for this visit.      Past Medical/Surgical History:   Patient Active Problem List   Diagnosis    Alopecia    Anomalous optic nerve (H)    Anxiety    Blurring of visual image    Decreased peripheral vision    Gastroesophageal reflux disease    Homonymous hemianopsia due to old cerebral infarction    Mixed hyperlipidemia    Essential hypertension    Hypertensive urgency    Impingement syndrome of right shoulder    Microalbuminuria    Morbid obesity (H)    Morbid obesity with BMI of 40.0-44.9, adult (H)    Nontraumatic complete tear of right rotator cuff    SUSIE (obstructive sleep apnea)    Other acute postprocedural pain    Pain of lower extremity    Recurrent major depressive episodes (H24)    Routine general medical examination at a health care facility    Tobacco use disorder    Type 2 diabetes mellitus with hyperglycemia, with long-term current use of insulin (H)    Undersocialized conduct disorder, aggressive type    Ventricular tachycardia, non-sustained (H)    Vitamin D deficiency    History of colonic polyps    History of electrophysiologic study for VT which was not found on 9/20/2019    History of CVA (cerebrovascular accident)    Chronic diastolic heart failure (H)    Tobacco abuse counseling    Fatigue, unspecified type    Cerebral infarction (H)    Cerebrovascular accident (CVA) due to bilateral embolism of posterior cerebral arteries (H)    CKD stage G2/A3, GFR 60-89 and albumin creatinine ratio >300 mg/g    Combined 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 MRI IMPRESSION: 1.  No acute intracranial abnormality. 2.  Normal MRI of the internal auditory canals and labyrinthine structures. 3.  Old infarctions in the left occipital lobe and left frontal centrum [place]ale    CKD stage G2/A3, GFR 60-89 and albumin creatinine ratio >300 mg/g 01/18/2022    Combined forms of age-related cataract of both eyes 02/23/2022    Last Assessment & Plan:  Formatting of this note might be different from the original. Mild bilateral cataracts. Not visually significant. - Monitor    cva 2014    right sided hemiparesis, resolved    Depression     Dizzy spells     hospitalized at [facility] in the past    DM (diabetes mellitus) (H)     High cholesterol 09/17/2019    History of colonic polyps 09/24/2020 4/2/2019--hyperplastic x 1--repeat colonoscopy 5 years 6/29/2016--6 polyps, mix of tubular adenoma and hyperplastic polyps.    Hypertension     Mixed hyperlipidemia 03/10/2010    Morbid obesity (H) 01/27/2004    Sleep apnea     uses CPAP    Tobacco use disorder 10/03/2008    Type 2 diabetes mellitus (H) 10/23/2008    Type 2 diabetes mellitus with hyperglycemia, with long-term current use of insulin (H) 10/23/2008    Diabetes dx'd:      Last A1c: HGA1C      7.0   1/4/2013     Last Chol: CHOL      199   10/16/2012 TRIG      193   10/16/2012 HDL       39   10/16/2012 LDL      171   6/9/2011     Microalbumin:  ALBCR     2832   2/8/2012        GFR      >60   10/16/2012   CREAT     0.93   10/16/2012    Smoker:  YES.  Aspirin:     Yes     Last eye exam: Diabetes dx'd:      Last A1c: HGA1C      7.0   1/4/2013     [place]

## 2024-03-07 LAB — VIT D+METAB SERPL-MCNC: 20 NG/ML (ref 20–50)

## 2024-03-19 NOTE — PROGRESS NOTES
11/27/23 0500   Appointment Info   Treating Provider Stacia Reno, OTR/L   Total/Authorized Visits 4/10   Medical Diagnosis H53.461 (ICD-10-CM) - Right homonymous hemianopsia   OT Tx Diagnosis Impaired I/ADLs   Precautions/Limitations R visual field loss, Quad cane   Quick Add  Certification   Progress Note/Certification   Start Of Care Date 10/09/23   Onset of Illness/Injury or Date of Surgery 07/28/23   Therapy Frequency 1x per week increasing or decreasing as clinically indicated   Predicted Duration 180 days   Certification date from 10/09/23   Certification date to 01/07/24   Progress Note Due Date 01/07/24   Progress Note Completed Date 10/09/23   Goals   OT Goals 1;2;3;4;5   OT Goal 1   Goal Identifier Near Vision   Goal Description Patient will demonstrate 3 pieces of adaptive equipment and/or adaptive techniques in regards to magnification, lighting, contrast and glare for increased ADL/IADL independence with near vision tasks (reading, meal prep, medication management, writing).   Rationale In order to safely and appropriately apply compensatory strategies with ADL/IADL performance   Goal Progress Goal not met due to limited treatment sessions.  See objective measures below for most recent status on near visual tasks.   Target Date 04/06/24   OT Goal 2   Goal Identifier Visual Field   Goal Description Patient will demonstrate WFLs visual reaction time and visual scanning skills for extrapersonal space with use of compensatory strategies prn, for safe functional and community mobility tasks (navigating in new areas, crossing the street, driving, grocery shopping) by scoring WNLs on all modes of the Dynavision and Scancourse.   Rationale In order to safely and appropriately apply compensatory strategies with ADL/IADL performance   Goal Progress Educated on numerous visual scanning patterns and development of HEP.  Goal not yet met due to limited number of treatment sessions and continued visual deficits -  see details below.   Target Date 04/06/24   OT Goal 3   Goal Identifier Environmental Adapatation   Goal Description Patient will demonstrate and/or verbalize 3 environmentally-based ADL modifications to improve visual-based ADL/IADL activities (including: bathing, cooking, meal prep, medication management, and functional mobility).   Rationale In order to safely and appropriately apply compensatory strategies with ADL/IADL performance   Goal Progress Goal not met and not addressed during sessions due to limited treatment sessions.   Target Date 04/06/24   OT Goal 4   Goal Identifier Community Resources   Goal Description Patient will verbalize awareness of community resources to increased Ind with I/ADLs and address: Large print materials, low vision devices, and transportation assistance.   Rationale In order to safely and appropriately apply compensatory strategies with ADL/IADL performance   Goal Progress Goal not met and not addressed during sessions due to limited treatment sessions.   Target Date 04/06/24   OT Goal 5   Goal Identifier ADL Adaptive Equipment   Goal Description patient will verbalize and demonstrate Mod I use of ADL AE & techniques to address reported challenges with bathing transfers.   Rationale In order to maximize safety and independence with performance of self-care activities   Goal Progress Goal not met and not addressed during sessions due to limited treatment sessions.   Target Date 04/06/24   Subjective Report   Subjective Report Patient now has a PCA - has had for 1 week, 7am to 4pm Monday - Friday.  Patient having a hard time getting used to having someone with her most of the day - tends to be more of a private person but happy to have extra help. Patient reports she has been told she has OCD and she likes things a certain way. Patient wondering if wearing a patch on her R eye would help her vision. Asking about driving - would like to be able to drive again.  Didn't have a chance  "to do her homework.   Objective Measures   Objective Measures Objective Measure 1;Objective Measure 2;Objective Measure 3;Objective Measure 4   Objective Measure 1   Objective Measure 11/6/23: Pepper Test (VSRT)   Details 11/6/23: 94% accuracy, total time: 3 min 15 seconds, corrected reading rate: 51 wpm   Objective Measure 2   Objective Measure 11/6/23: Scancourse   Details 11/6/23: Trial 1:  40% on left, 80% on right; challenges with scanning to left due to hypervigillance to Right. Trial 2: 100% on Left, 70% on right side.   Objective Measure 3   Objective Measure Dynavision   Details Mode A 60 sec.: 16 hits (reaction time: 3.0 seconds upper left, 1.6 upper right, 1.4 lower left, 7.2 lower right); patient re-educated in circular scanning strategy; then completed trial 2: 39 hits (1.4 upper left, 1.8 upper right, 1.3 lower left, 1.8 lower right);  trial 3: 37 hits (1.5 upper left, 1.5 upper right, 1.2 lower left, 1.9 lower right);Mode B 60 sec.: 15 hits and reaction time accuracy in each quadrant: 40% upper L, 23% upper R, 33% lower L and 9% lower R; improvement with increased education and practice; all are below normal limits as 52+ is WNL for Mode A and 42 hits for Mode B   Objective Measure 4   Objective Measure R Foot Tap Measure for reaction time R LE   Details 15 sec. (BNLs is > or = 6 sec.)   Treatment Interventions (OT)   Interventions Self Care/Home Management   Self Care/Home Management   Self-Care/Home Mgmt/ADL, Compensatory, Meal Prep Minutes (67980) 56 Minutes   Self Care 1 central and visual scanning training, reading assessment   Skilled Intervention Visual attention and visual scanning activities via [employer] worksheets (8     x 14   pages horizontally) - some unstructured and some of them structured: Word Search:  completed in 3'45\" and 100% accurate (average is 1 28\" and 98% accuracy); Random/Unstructured Complex Circles: completed in 3'03   and 100% accurate (improved from 5 errors when completed " "previously; average is 1 24\" and 99.6% accuracy). Patient using good scanning strategies and educated on working on speed now and continue with improved accuracy. Educated in recommendation not to wear an eye patch and reasons why.  Educated in: normal peripheral visual fields, how Visual field cut from CVA causes deficit in both eyes, visual skills (as well as cognitive and physical skills) required to operate a motor vehicle safely and [place] laws surrounding visual field and driving.  Completed Dynavision/extrapersonal scanning - see above; patient educated in results and applicaiton to daily skills.  Educated in lighthouse scanning strategy and how to apply to functional mobility and navigation.   Patient Response/Progress progress towards goals 1,2,3   Education   Learner/Method Demonstration;Patient   Education Comments see above   Plan   Home program cont. as previously: circular and lighthouse scanning, find landmark and watch for obstacles, reading guide, cont. 4M and 3M scanning sheets, begin with anchor and progress to non anchor then repeat pattern with next M size down   Plan for next session ed. in Speed card game, visual apps if has ipad, etc.; ed. in simulated DV with post-it's, etc.; dynavision every session if able, scancoure during sessions if able. *practice reading large print bible - does she employ strategies?; Continue to work on visual scanning for central (more/new BiV[employer] scanning sheets, reading, etc.) AND WORK ON INCREASING SPEED; cont. to ed. in peripheral - (reinforce circular and lighthouse scanning). Would also benefit from discussion surrounding ADL AE and progressing Ind with ADLs for bathing, general education on contrast, lighting, organization, glareware assessment, community resources for driving/[employer] books/LV aids, attempt to see what HH vs stand vs dome mag would work for near vision tasks, kitchen AE and safety during cooking; will need re-do of visual field at 6+ months if " still wanting to drive; repeat Foot Tap Test later (did on 11/27)   Total Session Time   Timed Code Treatment Minutes 56   Total Treatment Time (sum of timed and untimed services) 56       DISCHARGE  Reason for Discharge: Patient has not made expected progress due to interrupted treatment attendance (three no-show appointments). Patient has failed to schedule further appointments.    Equipment Issued: N/A    Discharge Plan: Patient to continue home program.  Will need new orders to resume occupational therapy.    Referring Provider:  Pippa Livingston

## 2024-03-25 ENCOUNTER — ALLIED HEALTH/NURSE VISIT (OUTPATIENT)
Dept: EDUCATION SERVICES | Facility: CLINIC | Age: 59
End: 2024-03-25
Payer: COMMERCIAL

## 2024-03-25 VITALS — WEIGHT: 216.3 LBS | BODY MASS INDEX: 38.33 KG/M2

## 2024-03-25 DIAGNOSIS — E11.69 TYPE 2 DIABETES MELLITUS WITH OTHER SPECIFIED COMPLICATION, WITH LONG-TERM CURRENT USE OF INSULIN (H): ICD-10-CM

## 2024-03-25 DIAGNOSIS — Z79.4 TYPE 2 DIABETES MELLITUS WITH OTHER SPECIFIED COMPLICATION, WITH LONG-TERM CURRENT USE OF INSULIN (H): ICD-10-CM

## 2024-03-25 PROCEDURE — G0108 DIAB MANAGE TRN  PER INDIV: HCPCS

## 2024-03-25 PROCEDURE — 99207 PR NO CHARGE NURSE ONLY: CPT

## 2024-03-25 NOTE — PATIENT INSTRUCTIONS
1. Eat 3 balanced meals each day - Monitor carb intake and limit to 45-60 grams per meal  This would be equal to 3-4 choices ~  1 choice = 15 grams    Do not wait longer than 4-5 hours to eat something  Snacks limit to no more than 30 grams of carbohydrates or 2 choices  Make sure you include protein source with each meal and at bedtime - this has been shown to help with blood glucose elevations    2.  Check blood sugars once each day - please try and alternate the times you check between am fast ing( right after you wake before eating) and 2 hours AFTER dinner - this allows us to get a better idea of what is happening throughout your day , not at just one time     Fasting and before meal target is 80 - 130   2 hours after a meal target is < 180  remember to bring meter and log book to all appointments    3. Incorporate 30 minutes activity into each day - does not need to be all at one time & walking counts    4. Take diabetes medications as prescribed Bydureon Bcise once weekly      Thank you for coming in to see me today !      I value your experience and would be very thankful for your time in providing feedback in our clinic survey.    You may receive an e-mail, text message or even something in the mail from [email or web address].  This is a survey to let us know how we are doing - the survey will be related to your diabetes education and me.

## 2024-03-25 NOTE — PROGRESS NOTES
Diabetes Self-Management Education & Support    Presents for: Individual review    Type of Service: In Person Visit      ASSESSMENT:  Rimma is a very pleasant 58-year-old who comes to clinic today for consult placed by her primary care provider in regards to her current diabetes self-management skills.  She arrived today unaccompanied.  Medications were reviewed and she really reports that she has not yet started the Bydureon BCise as she wanted to wait until she received more information and education on the medication-which we will do today.  Prior to our visit today I did do a review of Rimma's chart and it was noted she had originally been diagnosed with DMT2 in 2013 but I was unable to find any notes in regards to any type of education received.  I asked her about this today only to find she does not recall ever receiving any type of formal education when initially diagnosed with diabetes.  In lieu of this information I asked if she would like to treat today as if this were a new diagnosis for her and she was receiving the education that would be provided for such, which she said she would.  Rimma reports she is currently checking her blood glucose 1-2 times daily using POCT, unfortunately she did not have her meter or any blood glucose printed data with her for visit.  Rimma is living independently and is going to school Monday through Thursday to complete her GED.  She is a smoker and counseling was provided.  She was very receptive to all the information/education that was provided to her today.  I will follow-up again with her in clinic in approximately 6 to 8 weeks and I instructed her to call with any questions or concerns that come up before that time.    Patient's most recent   Lab Results   Component Value Date    A1C 6.9 02/20/2024    A1C 6.6 04/26/2021     is meeting goal of <7.0    Diabetes knowledge and skills assessment:       Continue education with the following diabetes management  concepts: Healthy Eating, Being Active, Taking Medication, Reducing Risks, and Healthy Coping    Based on learning assessment above, most appropriate setting for further diabetes education would be: Individual setting.      PLAN    See Patient Instructions for co-developed, patient-stated behavior change goals.  AVS printed and provided to patient today. See Follow-Up section for recommended follow-up.       Topics to cover at upcoming visits: Healthy Eating, Being Active, Monitoring, Taking Medication, and Reducing Risks    Follow-up: 5/20/24    See Care Plan for co-developed, patient-state behavior change goals.  AVS provided for patient today.    Education Materials Provided:  Living Healthy with Diabetes, BG Log Sheet, Carbohydrate Counting, Medication Information on GLP1 agonists, and My Plate Planner      SUBJECTIVE/OBJECTIVE:  Presents for: Individual review  Accompanied by: Self  Diabetes education in the past 24 mo: No  Focus of Visit: Monitoring, Reducing Risks, Taking Medication, Healthy Coping, Healthy Eating, Problem Solving, Self-care behavioral goal setting, Assistance w/ making life changes, Diabetes Pathophysiology, Being Active, GLP-1 Start  GLP-1 Type: Bydureon BCise auto injector  Diabetes type: Type 2  Date of diagnosis: 2013  Disease course: Stable  Other concerns:: None  Cultural Influences/Ethnic Background:  Not  or       Diabetes Symptoms & Complications:  Diabetes Related Symptoms: Polydipsia (increased thirst), Polyphagia (increased hunger), Polyuria (increased urination)  Weight trend: Stable  Disease course: Stable  Complications assessed today?: Yes  CVA: Yes    Patient Problem List and Family Medical History reviewed for relevant medical history, current medical status, and diabetes risk factors.    Vitals:  Wt 98.1 kg (216 lb 4.8 oz)   LMP  (LMP Unknown)   BMI 38.33 kg/m    Estimated body mass index is 38.33 kg/m  as calculated from the following:    Height as of  "2/20/24: 1.6 m (5' 2.99\").    Weight as of this encounter: 98.1 kg (216 lb 4.8 oz).   Last 3 BP:   BP Readings from Last 3 Encounters:   02/20/24 130/86   03/24/23 132/85   04/26/21 134/70       History   Smoking Status    Every Day    Packs/day: 0.25    Types: Cigarettes    Last attempt to quit: 8/17/2019   Smokeless Tobacco    Never       Labs:  Lab Results   Component Value Date    A1C 6.9 02/20/2024    A1C 6.6 04/26/2021     Lab Results   Component Value Date     02/20/2024     04/26/2021     Lab Results   Component Value Date     02/20/2024    LDL 73 09/09/2020     HDL Cholesterol   Date Value Ref Range Status   09/09/2020 46 (L) >49 mg/dL Final     Direct Measure HDL   Date Value Ref Range Status   02/20/2024 55 >=50 mg/dL Final   ]  GFR Estimate   Date Value Ref Range Status   02/20/2024 73 >60 mL/min/1.73m2 Final   04/26/2021 58 (L) >60 mL/min/[1.73_m2] Final     Comment:     Non  GFR Calc  Starting 12/18/2018, serum creatinine based estimated GFR (eGFR) will be   calculated using the Chronic Kidney Disease Epidemiology Collaboration   (CKD-EPI) equation.       GFR Estimate If Black   Date Value Ref Range Status   04/26/2021 67 >60 mL/min/[1.73_m2] Final     Comment:      GFR Calc  Starting 12/18/2018, serum creatinine based estimated GFR (eGFR) will be   calculated using the Chronic Kidney Disease Epidemiology Collaboration   (CKD-EPI) equation.       Lab Results   Component Value Date    CR 0.90 02/20/2024    CR 1.07 04/26/2021     No results found for: \"MICROALBUMIN\"    Healthy Eating:  Healthy Eating Assessed Today: Yes  Cultural/Yazdanism diet restrictions?: No  Do you have any food allergies or intolerances?: No  Meal planning/habits: None  Who cooks/prepares meals for you?: Self  Who purchases food in  your home?: Self  How many times a week on average do you eat food made away from home (restaurant/take-out)?:  (if OTE will have pancakes, eggs, " sausage, HB)  Meals include: Lunch, Afternoon Snack, Evening Snack  Breakfast: up at 0500 - has a cappuccino (mix from [employer])  Lunch: 1-2 pm: hot dogs, polish sausage, egg or lunch meat sandwich  Dinner: skips  Snacks: cashews, dried cranberries  Other: likes vegetables,  trying to stau away from chips - allergic to pork  Beverages: Water, Tea, Coffee, Juice, Soda, Other (see Comments)  Please elaborate:: sweet tea is dry mix : 1 1/3 T = 17 CHO, juice daily 8-10 oz    Being Active:  Being Active Assessed Today: Yes  Exercise:: Yes  Days per week of moderate to strenuous exercise (like a brisk walk): 5   Reports she is currently walking 4-5000 steps per day  On average, minutes per day of exercise at this level: 30  How intense was your typical exercise? : Moderate (like brisk walking)  Exercise Minutes per Week: 150  Barrier to exercise: None    Monitoring:  Monitoring Assessed Today: Yes  Did patient bring glucose meter to appointment? : No  Times checking blood sugar at home (number): 2  Times checking blood sugar at home (per): Day    Being that Rimma did not have her meter or any blood glucose data with her today all information came solely from her recall alone.  Per her report she is currently checking her blood glucose 1-2 times daily a.m. fasting and again in the evening.  Her fasting blood glucose reading this morning was 128 mg/dL.  Her primary concern/goal for today's visit was to provide her with information/resources to help keep her blood glucose under control.    Taking Medications:  Diabetes Medication(s)       Incretin Mimetic Agents       exenatide ER (BYDUREON BCISE) 2 MG/0.85ML auto-injector Inject 2 mg Subcutaneous every 7 days            Taking Medication Assessed Today: Yes  Current Treatments: Non-insulin Injectables  Given by: Patient  Injection/Infusion sites: Abdomen  Problems taking diabetes medications regularly?:  (reports she has not yet started GLP1)    Problem Solving:  Problem  Solving Assessed Today: Yes  Is the patient at risk for hypoglycemia?: No  Is the patient at risk for DKA?: No  Does patient have severe weather/disaster plan for diabetes management?: Not Needed  Does patient have sick day plan for diabetes management?: Not Needed    Hypoglycemia Symptoms  Hypoglycemia: Nervousness/Anxiety, None    Hypoglycemia Complications  Hypoglycemia Complications: None    Reducing Risks:  Reducing Risks Assessed Today: Yes  Diabetes Risks: Age over 45 years, Sedentary Lifestyle, Ethnicity, Hyperlipidemia, Family History  CAD Risks: Diabetes Mellitus, Family history, Sedentary lifestyle, Tobacco exposure, Hypertension, Obesity  Has dilated eye exam at least once a year?: Yes  Feet checked by healthcare provider in the last year?: Yes    Healthy Coping:  Healthy Coping Assessed Today: Yes  Emotional response to diabetes: Ready to learn  Stage of change: PREPARATION (Decided to change - considering how)  Support resources: In-person Offerings  Patient Activation Measure Survey Score:       No data to display                  Care Plan and Education Provided:  GLP-1 administration technique taught today. Patient verbalized understanding and was able to perform an accurate return demonstration of administration technique. Side effects were discussed, if patient has any abdominal pain, with or without nausea and/or vomiting, stop medication, call provider, clinic or go to the emergency room.  Care Plan: Diabetes   Updates made by Radha Blanchard RN since 3/25/2024 12:00 AM        Problem: HbA1C Not In Goal         Goal: Establish Regular Follow-Ups with PCP    This Visit's Progress: 70%   Note:    [facility]       Task: Discuss with PCP the recommended timing for patient's next follow up visit(s) Completed 3/25/2024   Responsible User: Radha Blanchard RN        Task: Discuss schedule for PCP visits with patient Completed 3/25/2024   Responsible User:  Radha Blanchard RN        Goal: Get HbA1C Level in Goal    This Visit's Progress: 100%   Note:    Current A1c = 6.9%       Task: Educate patient on diabetes education self-management topics Completed 3/25/2024   Responsible User: Radha Blanchard RN        Task: Educate patient on benefits of regular glucose monitoring Completed 3/25/2024   Responsible User: Radha Blanchard RN        Task: Refer patient to appropriate extended care team member, as needed (Medication Therapy Management, Behavioral Health, Physical Therapy, etc.)    Responsible User: Radha Blanchard RN        Task: Discuss diabetes treatment plan with patient Completed 3/25/2024   Responsible User: Radha Blanchard RN        Problem: Diabetes Self-Management Education Needed to Optimize Self-Care Behaviors         Goal: Understand diabetes pathophysiology and disease progression    This Visit's Progress: 100%   Note:    Completed at visit today.       Task: Provide education on diabetes pathophysiology and disease progression specfic to patient's diabetes type Completed 3/25/2024   Responsible User: Rahda Blanchard RN        Goal: Healthy Eating - follow a healthy eating pattern for diabetes    This Visit's Progress: 70%        Task: Provide education on portion control and consistency in amount, composition and timing of food intake Completed 3/25/2024   Responsible User: Radha Blanchard RN        Task: Provide education on managing carbohydrate intake (carbohydrate counting, plate planning method, etc.) Completed 3/25/2024   Responsible User: Radha Blanchard RN        Task: Provide education on weight management Completed 3/25/2024   Responsible User: Radha Blanchard RN        Task: Provide education on heart healthy eating Completed 3/25/2024   Responsible User: Radha Blanchard RN        Task: Provide education on eating out Completed 3/25/2024   Responsible User: Radha Blanchard RN        Task: Develop individualized healthy  eating plan with patient Completed 3/25/2024   Responsible User: Radha Blanchard RN        Goal: Being Active - get regular physical activity, working up to at least 150 minutes per week    This Visit's Progress: 60%        Task: Provide education on relationship of activity to glucose and precautions to take if at risk for low glucose Completed 3/25/2024   Responsible User: Radha Blanchard RN        Task: Discuss barriers to physical activity with patient Completed 3/25/2024   Responsible User: Radha Blanchard RN        Task: Develop physical activity plan with patient    Responsible User: Radha Blanchard RN        Task: Explore community resources including walking groups, assistance programs, and home videos    Responsible User: Radha Blanchard RN        Goal: Monitoring - monitor glucose and ketones as directed    This Visit's Progress: 70%        Task: Provide education on blood glucose monitoring (purpose, proper technique, frequency, glucose targets, interpreting results, when to use glucose control solution, sharps disposal) Completed 3/25/2024   Responsible User: Radha Blanchard RN        Task: Provide education on continuous glucose monitoring (sensor placement, use of tamra or /reader, understanding glucose trends, alerts and alarms, differences between sensor glucose and blood glucose)    Responsible User: Radha Blanchard RN        Task: Provide education on ketone monitoring (when to monitor, frequency, etc.)    Responsible User: Radha Blanchard RN        Goal: Taking Medication - patient is consistently taking medications as directed    This Visit's Progress: 60%   Note:    Delayed start until after receiving education today.       Task: Provide education on action of prescribed medication, including when to take and possible side effects Completed 3/25/2024   Responsible User: Radha Blanchard RN        Task: Provide education on insulin and injectable diabetes medications,  including administration, storage, site selection and rotation for injection sites Completed 3/25/2024   Responsible User: Radha Blanchard RN        Task: Discuss barriers to medication adherence with patient and provide management technique ideas as appropriate    Responsible User: Radha Blanchard RN        Task: Provide education on frequency and refill details of medications Completed 3/25/2024   Responsible User: Radha Blanchard RN        Goal: Problem Solving - know how to prevent and manage short-term diabetes complications    This Visit's Progress: 80%        Task: Provide education on high blood glucose - causes, signs/symptoms, prevention and treatment Completed 3/25/2024   Responsible User: Radha Blanchard RN        Task: Provide education on low blood glucose - causes, signs/symptoms, prevention, treatment, carrying a carbohydrate source at all times, and medical identification Completed 3/25/2024   Responsible User: Radha Blanchard RN        Task: Provide education on safe travel with diabetes Completed 3/25/2024   Responsible User: Radha Blanchard RN        Task: Provide education on how to care for diabetes on sick days Completed 3/25/2024   Responsible User: Radha Blanchard RN        Task: Provide education on when to call a health care provider    Responsible User: Radha Blanchard RN        Goal: Reducing Risks - know how to prevent and treat long-term diabetes complications    This Visit's Progress: 80%        Task: Provide education on major complications of diabetes, prevention, early diagnostic measures and treatment of complications Completed 3/25/2024   Responsible User: Radha Blanchard RN        Task: Provide education on recommended care for dental, eye and foot health Completed 3/25/2024   Responsible User: Radha Blanchard RN        Task: Provide education on Hemoglobin A1c - goals and relationship to blood glucose levels Completed 3/25/2024   Responsible User: Santo  Radha RONQUILLO RN        Task: Provide education on recommendations for heart health - lipid levels and goals, blood pressure and goals, and aspirin therapy, if indicated Completed 3/25/2024   Responsible User: Radha Blanchard RN        Task: Provide education on tobacco cessation Completed 3/25/2024   Responsible User: Radha Blanchard RN        Goal: Healthy Coping - use available resources to cope with the challenges of managing diabetes    This Visit's Progress: 70%   Note:    Rimma was very receptive to all the information/education that was provided to her today.       Task: Discuss recognizing feelings about having diabetes Completed 3/25/2024   Responsible User: Radha Blanchard RN        Task: Provide education on the benefits of making appropriate lifestyle changes Completed 3/25/2024   Responsible User: Radha Blanchard RN        Task: Provide education on benefits of utilizing support systems Completed 3/25/2024   Responsible User: Radha Blanchard RN        Task: Discuss methods for coping with stress    Responsible User: Radha Blanchard RN        Task: Provide education on when to seek professional counseling    Responsible User: Radha Blanchard RN          Thank you,  Radha Blanchard RN [facility][note 3]  Certified Diabetes Care and   Visit type : DSMT      Time Spent: 60 minutes  Encounter Type: Individual    Any diabetes medication dose changes were made via the CDE Protocol per the patient's referring provider and primary care provider. A copy of this encounter was shared with the provider.   Much or all of the text in this note was generated through the use of the Dragon Dictate voice-to-text software.Errors in spelling or words which seem out of context are unintentional. Sound alike errors, in particular, may have escaped editing.

## 2024-03-25 NOTE — LETTER
3/25/2024         RE: Rimma Sarmiento  [address]  [place] [address]        Dear Colleague,    Thank you for referring your patient, Rimma Sarmiento, to the [facility]. Please see a copy of my visit note below.    Diabetes Self-Management Education & Support    Presents for: Individual review    Type of Service: In Person Visit      ASSESSMENT:  Rimma is a very pleasant 58-year-old who comes to clinic today for consult placed by her primary care provider in regards to her current diabetes self-management skills.  She arrived today unaccompanied.  Medications were reviewed and she really reports that she has not yet started the Bydureon BCise as she wanted to wait until she received more information and education on the medication-which we will do today.  Prior to our visit today I did do a review of Rimma's chart and it was noted she had originally been diagnosed with DMT2 in 2013 but I was unable to find any notes in regards to any type of education received.  I asked her about this today only to find she does not recall ever receiving any type of formal education when initially diagnosed with diabetes.  In lieu of this information I asked if she would like to treat today as if this were a new diagnosis for her and she was receiving the education that would be provided for such, which she said she would.  Rimma reports she is currently checking her blood glucose 1-2 times daily using POCT, unfortunately she did not have her meter or any blood glucose printed data with her for visit.  Nora is living independently and is going to school Monday through Thursday to complete her GED.  She is a smoker and counseling was provided.  She was very receptive to all the information/education that was provided to her today.  I will follow-up again with her in clinic in approximately 6 to 8 weeks and I instructed her to call with any questions or concerns that come  up before that time.    Patient's most recent   Lab Results   Component Value Date    A1C 6.9 02/20/2024    A1C 6.6 04/26/2021     is meeting goal of <7.0    Diabetes knowledge and skills assessment:       Continue education with the following diabetes management concepts: Healthy Eating, Being Active, Taking Medication, Reducing Risks, and Healthy Coping    Based on learning assessment above, most appropriate setting for further diabetes education would be: Individual setting.      PLAN    See Patient Instructions for co-developed, patient-stated behavior change goals.  AVS printed and provided to patient today. See Follow-Up section for recommended follow-up.       Topics to cover at upcoming visits: Healthy Eating, Being Active, Monitoring, Taking Medication, and Reducing Risks    Follow-up: 5/20/24    See Care Plan for co-developed, patient-state behavior change goals.  AVS provided for patient today.    Education Materials Provided:  Living Healthy with Diabetes, BG Log Sheet, Carbohydrate Counting, Medication Information on GLP1 agonists, and My Plate Planner      SUBJECTIVE/OBJECTIVE:  Presents for: Individual review  Accompanied by: Self  Diabetes education in the past 24 mo: No  Focus of Visit: Monitoring, Reducing Risks, Taking Medication, Healthy Coping, Healthy Eating, Problem Solving, Self-care behavioral goal setting, Assistance w/ making life changes, Diabetes Pathophysiology, Being Active, GLP-1 Start  GLP-1 Type: Bydureon BCise auto injector  Diabetes type: Type 2  Date of diagnosis: 2013  Disease course: Stable  Other concerns:: None  Cultural Influences/Ethnic Background:  Not  or       Diabetes Symptoms & Complications:  Diabetes Related Symptoms: Polydipsia (increased thirst), Polyphagia (increased hunger), Polyuria (increased urination)  Weight trend: Stable  Disease course: Stable  Complications assessed today?: Yes  CVA: Yes    Patient Problem List and Family Medical History  "reviewed for relevant medical history, current medical status, and diabetes risk factors.    Vitals:  Wt 98.1 kg (216 lb 4.8 oz)   LMP  (LMP Unknown)   BMI 38.33 kg/m    Estimated body mass index is 38.33 kg/m  as calculated from the following:    Height as of 2/20/24: 1.6 m (5' 2.99\").    Weight as of this encounter: 98.1 kg (216 lb 4.8 oz).   Last 3 BP:   BP Readings from Last 3 Encounters:   02/20/24 130/86   03/24/23 132/85   04/26/21 134/70       History   Smoking Status     Every Day     Packs/day: 0.25     Types: Cigarettes     Last attempt to quit: 8/17/2019   Smokeless Tobacco     Never       Labs:  Lab Results   Component Value Date    A1C 6.9 02/20/2024    A1C 6.6 04/26/2021     Lab Results   Component Value Date     02/20/2024     04/26/2021     Lab Results   Component Value Date     02/20/2024    LDL 73 09/09/2020     HDL Cholesterol   Date Value Ref Range Status   09/09/2020 46 (L) >49 mg/dL Final     Direct Measure HDL   Date Value Ref Range Status   02/20/2024 55 >=50 mg/dL Final   ]  GFR Estimate   Date Value Ref Range Status   02/20/2024 73 >60 mL/min/1.73m2 Final   04/26/2021 58 (L) >60 mL/min/[1.73_m2] Final     Comment:     Non  GFR Calc  Starting 12/18/2018, serum creatinine based estimated GFR (eGFR) will be   calculated using the Chronic Kidney Disease Epidemiology Collaboration   (CKD-EPI) equation.       GFR Estimate If Black   Date Value Ref Range Status   04/26/2021 67 >60 mL/min/[1.73_m2] Final     Comment:      GFR Calc  Starting 12/18/2018, serum creatinine based estimated GFR (eGFR) will be   calculated using the Chronic Kidney Disease Epidemiology Collaboration   (CKD-EPI) equation.       Lab Results   Component Value Date    CR 0.90 02/20/2024    CR 1.07 04/26/2021     No results found for: \"MICROALBUMIN\"    Healthy Eating:  Healthy Eating Assessed Today: Yes  Cultural/Sabianist diet restrictions?: No  Do you have any food " allergies or intolerances?: No  Meal planning/habits: None  Who cooks/prepares meals for you?: Self  Who purchases food in  your home?: Self  How many times a week on average do you eat food made away from home (restaurant/take-out)?:  (if OTE will have pancakes, eggs, sausage, HB)  Meals include: Lunch, Afternoon Snack, Evening Snack  Breakfast: up at 0500 - has a cappuccino (mix from [employer])  Lunch: 1-2 pm: hot dogs, polish sausage, egg or lunch meat sandwich  Dinner: skips  Snacks: cashews, dried cranberries  Other: likes vegetables,  trying to stau away from chips - allergic to pork  Beverages: Water, Tea, Coffee, Juice, Soda, Other (see Comments)  Please elaborate:: sweet tea is dry mix : 1 1/3 T = 17 CHO, juice daily 8-10 oz    Being Active:  Being Active Assessed Today: Yes  Exercise:: Yes  Days per week of moderate to strenuous exercise (like a brisk walk): 5   Reports she is currently walking 4-5000 steps per day  On average, minutes per day of exercise at this level: 30  How intense was your typical exercise? : Moderate (like brisk walking)  Exercise Minutes per Week: 150  Barrier to exercise: None    Monitoring:  Monitoring Assessed Today: Yes  Did patient bring glucose meter to appointment? : No  Times checking blood sugar at home (number): 2  Times checking blood sugar at home (per): Day    Being that Rimma did not have her meter or any blood glucose data with her today all information came solely from her recall alone.  Per her report she is currently checking her blood glucose 1-2 times daily a.m. fasting and again in the evening.  Her fasting blood glucose reading this morning was 128 mg/dL.  Her primary concern/goal for today's visit was to provide her with information/resources to help keep her blood glucose under control.    Taking Medications:  Diabetes Medication(s)       Incretin Mimetic Agents       exenatide ER (BYDUREON BCISE) 2 MG/0.85ML auto-injector Inject 2 mg Subcutaneous every 7  days            Taking Medication Assessed Today: Yes  Current Treatments: Non-insulin Injectables  Given by: Patient  Injection/Infusion sites: Abdomen  Problems taking diabetes medications regularly?:  (reports she has not yet started GLP1)    Problem Solving:  Problem Solving Assessed Today: Yes  Is the patient at risk for hypoglycemia?: No  Is the patient at risk for DKA?: No  Does patient have severe weather/disaster plan for diabetes management?: Not Needed  Does patient have sick day plan for diabetes management?: Not Needed    Hypoglycemia Symptoms  Hypoglycemia: Nervousness/Anxiety, None    Hypoglycemia Complications  Hypoglycemia Complications: None    Reducing Risks:  Reducing Risks Assessed Today: Yes  Diabetes Risks: Age over 45 years, Sedentary Lifestyle, Ethnicity, Hyperlipidemia, Family History  CAD Risks: Diabetes Mellitus, Family history, Sedentary lifestyle, Tobacco exposure, Hypertension, Obesity  Has dilated eye exam at least once a year?: Yes  Feet checked by healthcare provider in the last year?: Yes    Healthy Coping:  Healthy Coping Assessed Today: Yes  Emotional response to diabetes: Ready to learn  Stage of change: PREPARATION (Decided to change - considering how)  Support resources: In-person Offerings  Patient Activation Measure Survey Score:       No data to display                  Care Plan and Education Provided:  GLP-1 administration technique taught today. Patient verbalized understanding and was able to perform an accurate return demonstration of administration technique. Side effects were discussed, if patient has any abdominal pain, with or without nausea and/or vomiting, stop medication, call provider, clinic or go to the emergency room.  Care Plan: Diabetes   Updates made by Radha Blanchard RN since 3/25/2024 12:00 AM        Problem: HbA1C Not In Goal         Goal: Establish Regular Follow-Ups with PCP    This Visit's Progress: 70%   Note:    Future follow-up appointment in  place with [facility].       Task: Discuss with PCP the recommended timing for patient's next follow up visit(s) Completed 3/25/2024   Responsible User: Radha Blanchard RN        Task: Discuss schedule for PCP visits with patient Completed 3/25/2024   Responsible User: Radha Blanchard RN        Goal: Get HbA1C Level in Goal    This Visit's Progress: 100%   Note:    Current A1c = 6.9%       Task: Educate patient on diabetes education self-management topics Completed 3/25/2024   Responsible User: Radha Blanchard RN        Task: Educate patient on benefits of regular glucose monitoring Completed 3/25/2024   Responsible User: Radha Blanchard RN        Task: Refer patient to appropriate extended care team member, as needed (Medication Therapy Management, Behavioral Health, Physical Therapy, etc.)    Responsible User: Radha Blanchard RN        Task: Discuss diabetes treatment plan with patient Completed 3/25/2024   Responsible User: Radha Blanchard RN        Problem: Diabetes Self-Management Education Needed to Optimize Self-Care Behaviors         Goal: Understand diabetes pathophysiology and disease progression    This Visit's Progress: 100%   Note:    Completed at visit today.       Task: Provide education on diabetes pathophysiology and disease progression specfic to patient's diabetes type Completed 3/25/2024   Responsible User: Radha Blanchard RN        Goal: Healthy Eating - follow a healthy eating pattern for diabetes    This Visit's Progress: 70%        Task: Provide education on portion control and consistency in amount, composition and timing of food intake Completed 3/25/2024   Responsible User: Radha Blanchard RN        Task: Provide education on managing carbohydrate intake (carbohydrate counting, plate planning method, etc.) Completed 3/25/2024   Responsible User: Radha Blanchard RN        Task: Provide education on weight management Completed 3/25/2024   Responsible User: Radha Blanchard RN         Task: Provide education on heart healthy eating Completed 3/25/2024   Responsible User: Radha Blanchard RN        Task: Provide education on eating out Completed 3/25/2024   Responsible User: Radha Blanchard RN        Task: Develop individualized healthy eating plan with patient Completed 3/25/2024   Responsible User: Radha Blanchard RN        Goal: Being Active - get regular physical activity, working up to at least 150 minutes per week    This Visit's Progress: 60%        Task: Provide education on relationship of activity to glucose and precautions to take if at risk for low glucose Completed 3/25/2024   Responsible User: Radha Blanchard RN        Task: Discuss barriers to physical activity with patient Completed 3/25/2024   Responsible User: Radha Blanchard RN        Task: Develop physical activity plan with patient    Responsible User: Radha Blanchard RN        Task: Explore community resources including walking groups, assistance programs, and home videos    Responsible User: Radha Blanchard RN        Goal: Monitoring - monitor glucose and ketones as directed    This Visit's Progress: 70%        Task: Provide education on blood glucose monitoring (purpose, proper technique, frequency, glucose targets, interpreting results, when to use glucose control solution, sharps disposal) Completed 3/25/2024   Responsible User: Radha Blanchard RN        Task: Provide education on continuous glucose monitoring (sensor placement, use of tamra or /reader, understanding glucose trends, alerts and alarms, differences between sensor glucose and blood glucose)    Responsible User: Radha Blanchard RN        Task: Provide education on ketone monitoring (when to monitor, frequency, etc.)    Responsible User: Radha Blanchard RN        Goal: Taking Medication - patient is consistently taking medications as directed    This Visit's Progress: 60%   Note:    Delayed start until after receiving education  today.       Task: Provide education on action of prescribed medication, including when to take and possible side effects Completed 3/25/2024   Responsible User: Radha Blanchard RN        Task: Provide education on insulin and injectable diabetes medications, including administration, storage, site selection and rotation for injection sites Completed 3/25/2024   Responsible User: Radha Blanchard RN        Task: Discuss barriers to medication adherence with patient and provide management technique ideas as appropriate    Responsible User: Radha Blanchard RN        Task: Provide education on frequency and refill details of medications Completed 3/25/2024   Responsible User: Radha Blanchard RN        Goal: Problem Solving - know how to prevent and manage short-term diabetes complications    This Visit's Progress: 80%        Task: Provide education on high blood glucose - causes, signs/symptoms, prevention and treatment Completed 3/25/2024   Responsible User: Radha Blanchard RN        Task: Provide education on low blood glucose - causes, signs/symptoms, prevention, treatment, carrying a carbohydrate source at all times, and medical identification Completed 3/25/2024   Responsible User: Radha Blanchard RN        Task: Provide education on safe travel with diabetes Completed 3/25/2024   Responsible User: Radha Blanchard RN        Task: Provide education on how to care for diabetes on sick days Completed 3/25/2024   Responsible User: Radha Blanchard RN        Task: Provide education on when to call a health care provider    Responsible User: Radha Blanchard RN        Goal: Reducing Risks - know how to prevent and treat long-term diabetes complications    This Visit's Progress: 80%        Task: Provide education on major complications of diabetes, prevention, early diagnostic measures and treatment of complications Completed 3/25/2024   Responsible User: Radha Blanchard RN        Task: Provide education  on recommended care for dental, eye and foot health Completed 3/25/2024   Responsible User: Radha Blanchard RN        Task: Provide education on Hemoglobin A1c - goals and relationship to blood glucose levels Completed 3/25/2024   Responsible User: Radha Blanchard RN        Task: Provide education on recommendations for heart health - lipid levels and goals, blood pressure and goals, and aspirin therapy, if indicated Completed 3/25/2024   Responsible User: Radha Blanchard RN        Task: Provide education on tobacco cessation Completed 3/25/2024   Responsible User: Radha Blanchard RN        Goal: Healthy Coping - use available resources to cope with the challenges of managing diabetes    This Visit's Progress: 70%   Note:    Rimma was very receptive to all the information/education that was provided to her today.       Task: Discuss recognizing feelings about having diabetes Completed 3/25/2024   Responsible User: Radha Blanchard RN        Task: Provide education on the benefits of making appropriate lifestyle changes Completed 3/25/2024   Responsible User: Radha Blanchard RN        Task: Provide education on benefits of utilizing support systems Completed 3/25/2024   Responsible User: Radha Blanchard RN        Task: Discuss methods for coping with stress    Responsible User: Radha Blanchard RN        Task: Provide education on when to seek professional counseling    Responsible User: Radha Blanchard RN          Thank you,  Radha Blanchard RN [facility]  Certified Diabetes Care and   Visit type : DSMT      Time Spent: 60 minutes  Encounter Type: Individual    Any diabetes medication dose changes were made via the CDE Protocol per the patient's referring provider and primary care provider. A copy of this encounter was shared with the provider.   Much or all of the text in this note was generated through the use of the Dragon Dictate voice-to-text software.Errors in spelling or words  which seem out of context are unintentional. Sound alike errors, in particular, may have escaped editing.

## 2024-03-26 DIAGNOSIS — G47.9 SLEEP DISORDER: Primary | ICD-10-CM

## 2024-03-26 DIAGNOSIS — K21.9 GASTROESOPHAGEAL REFLUX DISEASE, UNSPECIFIED WHETHER ESOPHAGITIS PRESENT: ICD-10-CM

## 2024-03-26 RX ORDER — LANOLIN ALCOHOL/MO/W.PET/CERES
3 CREAM (GRAM) TOPICAL
Qty: 90 TABLET | Refills: 0 | Status: SHIPPED | OUTPATIENT
Start: 2024-03-26

## 2024-03-26 RX ORDER — PANTOPRAZOLE SODIUM 40 MG/1
40 TABLET, DELAYED RELEASE ORAL DAILY
Qty: 90 TABLET | Refills: 0 | Status: SHIPPED | OUTPATIENT
Start: 2024-03-26 | End: 2024-06-06

## 2024-04-10 NOTE — TELEPHONE ENCOUNTER
Appointment scheduled with Rosalba for 4/16 as patient is having a biopsy on 4/15 and the results won't be back yet.  Patient will be rescheduled on a Tuesday for when the biopsy results are back.   Received referral for [facility] services.  Attempted to reach pt but she was unavailable.  LVM for return call and will attempt again.    Sona Thakkar, DAMIAN  Social Work Care Coordinator  Behavioral [facility]  [phone] option 2 or [phone]

## 2024-04-22 DIAGNOSIS — K21.9 GASTROESOPHAGEAL REFLUX DISEASE, UNSPECIFIED WHETHER ESOPHAGITIS PRESENT: ICD-10-CM

## 2024-04-22 RX ORDER — PANTOPRAZOLE SODIUM 40 MG/1
40 TABLET, DELAYED RELEASE ORAL DAILY
Qty: 90 TABLET | Refills: 0 | OUTPATIENT
Start: 2024-04-22

## 2024-05-08 ENCOUNTER — TELEPHONE (OUTPATIENT)
Dept: OPTOMETRY | Facility: CLINIC | Age: 59
End: 2024-05-08

## 2024-05-08 NOTE — TELEPHONE ENCOUNTER
Home/mobile [phone]    Called times 3 and left message at [phone]    Reviewed to Stop the Clear Eyes drop as can help with redness temporarily, but can cause irritation/redness with continued use (cycle).    Recommended staying out of contact lenses until symptoms resolve and use OTC lubricating eye drops--Refresh/Systane and if preservative free may use with increase frequency.    Reviewed if having any new eye pain, redness, light sensitivities, vision changes to contact clinic-- provided direct triage number    Reviewed if not having any improvement by early next week to reach out to review scheduling options.    Chuck Butler RN 3:53 PM 05/08/24

## 2024-05-08 NOTE — TELEPHONE ENCOUNTER
[facility] Call Center    Phone Message    May a detailed message be left on voicemail: yes     Reason for Call: Symptoms or Concerns     If patient has red-flag symptoms, warm transfer to triage line    Current symptom or concern: Pt states that for about a week now she has had burning and redness of both eyes. She has used Clear Eyes which helps for maybe an hour or 2 but then symptoms come back. She is wondering if her new glasses could be causing this.    Symptoms have been present for:  1 week(s)    Has patient previously been seen for this? No    By : Dr. Ordoñez    Date: 7/28/23    Are there any new or worsening symptoms? Yes: Eye redness/burning    Action Taken: Message routed to:  [facility]    Travel Screening: Not Applicable

## 2024-05-18 DIAGNOSIS — L65.9 ALOPECIA: ICD-10-CM

## 2024-05-21 RX ORDER — VITAMIN B COMPLEX
TABLET ORAL
Qty: 90 TABLET | Refills: 1 | Status: SHIPPED | OUTPATIENT
Start: 2024-05-21 | End: 2024-07-10

## 2024-06-05 SDOH — HEALTH STABILITY: PHYSICAL HEALTH: ON AVERAGE, HOW MANY DAYS PER WEEK DO YOU ENGAGE IN MODERATE TO STRENUOUS EXERCISE (LIKE A BRISK WALK)?: 3 DAYS

## 2024-06-05 SDOH — HEALTH STABILITY: PHYSICAL HEALTH: ON AVERAGE, HOW MANY MINUTES DO YOU ENGAGE IN EXERCISE AT THIS LEVEL?: 50 MIN

## 2024-06-05 ASSESSMENT — PATIENT HEALTH QUESTIONNAIRE - PHQ9
10. IF YOU CHECKED OFF ANY PROBLEMS, HOW DIFFICULT HAVE THESE PROBLEMS MADE IT FOR YOU TO DO YOUR WORK, TAKE CARE OF THINGS AT HOME, OR GET ALONG WITH OTHER PEOPLE: SOMEWHAT DIFFICULT
SUM OF ALL RESPONSES TO PHQ QUESTIONS 1-9: 12
SUM OF ALL RESPONSES TO PHQ QUESTIONS 1-9: 12

## 2024-06-05 ASSESSMENT — SOCIAL DETERMINANTS OF HEALTH (SDOH): HOW OFTEN DO YOU GET TOGETHER WITH FRIENDS OR RELATIVES?: NEVER

## 2024-06-06 ENCOUNTER — OFFICE VISIT (OUTPATIENT)
Dept: FAMILY MEDICINE | Facility: CLINIC | Age: 59
End: 2024-06-06
Attending: NURSE PRACTITIONER
Payer: COMMERCIAL

## 2024-06-06 VITALS
SYSTOLIC BLOOD PRESSURE: 128 MMHG | RESPIRATION RATE: 10 BRPM | DIASTOLIC BLOOD PRESSURE: 76 MMHG | HEIGHT: 64 IN | HEART RATE: 74 BPM | TEMPERATURE: 98.5 F | WEIGHT: 216.2 LBS | OXYGEN SATURATION: 98 % | BODY MASS INDEX: 36.91 KG/M2

## 2024-06-06 DIAGNOSIS — Z98.890 HISTORY OF ELECTROPHYSIOLOGIC STUDY: ICD-10-CM

## 2024-06-06 DIAGNOSIS — R22.33 NODULE OF FINGER, BILATERAL: ICD-10-CM

## 2024-06-06 DIAGNOSIS — F17.200 SMOKER: ICD-10-CM

## 2024-06-06 DIAGNOSIS — E11.65 TYPE 2 DIABETES MELLITUS WITH HYPERGLYCEMIA, WITH LONG-TERM CURRENT USE OF INSULIN (H): ICD-10-CM

## 2024-06-06 DIAGNOSIS — Z23 NEED FOR VACCINATION: ICD-10-CM

## 2024-06-06 DIAGNOSIS — F17.200 TOBACCO USE DISORDER: ICD-10-CM

## 2024-06-06 DIAGNOSIS — F33.9 RECURRENT MAJOR DEPRESSIVE EPISODES (H): ICD-10-CM

## 2024-06-06 DIAGNOSIS — Z12.11 COLON CANCER SCREENING: ICD-10-CM

## 2024-06-06 DIAGNOSIS — R80.9 MICROALBUMINURIA: ICD-10-CM

## 2024-06-06 DIAGNOSIS — E78.2 MIXED HYPERLIPIDEMIA: ICD-10-CM

## 2024-06-06 DIAGNOSIS — Z79.4 TYPE 2 DIABETES MELLITUS WITH HYPERGLYCEMIA, WITH LONG-TERM CURRENT USE OF INSULIN (H): ICD-10-CM

## 2024-06-06 DIAGNOSIS — N18.2 CKD STAGE G2/A3, GFR 60-89 AND ALBUMIN CREATININE RATIO >300 MG/G: ICD-10-CM

## 2024-06-06 DIAGNOSIS — Z86.73 HISTORY OF CVA (CEREBROVASCULAR ACCIDENT): ICD-10-CM

## 2024-06-06 DIAGNOSIS — Z00.00 ROUTINE GENERAL MEDICAL EXAMINATION AT A HEALTH CARE FACILITY: ICD-10-CM

## 2024-06-06 DIAGNOSIS — I10 ESSENTIAL HYPERTENSION: ICD-10-CM

## 2024-06-06 DIAGNOSIS — I47.29 VENTRICULAR TACHYCARDIA, NON-SUSTAINED (H): ICD-10-CM

## 2024-06-06 DIAGNOSIS — E11.65 TYPE 2 DIABETES MELLITUS WITH HYPERGLYCEMIA, WITHOUT LONG-TERM CURRENT USE OF INSULIN (H): Primary | ICD-10-CM

## 2024-06-06 DIAGNOSIS — K21.9 GASTROESOPHAGEAL REFLUX DISEASE WITHOUT ESOPHAGITIS: ICD-10-CM

## 2024-06-06 LAB — HBA1C MFR BLD: 6.8 % (ref 0–5.6)

## 2024-06-06 PROCEDURE — 99214 OFFICE O/P EST MOD 30 MIN: CPT | Mod: 25 | Performed by: NURSE PRACTITIONER

## 2024-06-06 PROCEDURE — 82043 UR ALBUMIN QUANTITATIVE: CPT | Performed by: NURSE PRACTITIONER

## 2024-06-06 PROCEDURE — 90471 IMMUNIZATION ADMIN: CPT | Performed by: NURSE PRACTITIONER

## 2024-06-06 PROCEDURE — 36415 COLL VENOUS BLD VENIPUNCTURE: CPT | Performed by: NURSE PRACTITIONER

## 2024-06-06 PROCEDURE — 99396 PREV VISIT EST AGE 40-64: CPT | Mod: 25 | Performed by: NURSE PRACTITIONER

## 2024-06-06 PROCEDURE — 90750 HZV VACC RECOMBINANT IM: CPT | Performed by: NURSE PRACTITIONER

## 2024-06-06 PROCEDURE — 90480 ADMN SARSCOV2 VAC 1/ONLY CMP: CPT | Performed by: NURSE PRACTITIONER

## 2024-06-06 PROCEDURE — 82570 ASSAY OF URINE CREATININE: CPT | Performed by: NURSE PRACTITIONER

## 2024-06-06 PROCEDURE — 83036 HEMOGLOBIN GLYCOSYLATED A1C: CPT | Performed by: NURSE PRACTITIONER

## 2024-06-06 PROCEDURE — 91320 SARSCV2 VAC 30MCG TRS-SUC IM: CPT | Performed by: NURSE PRACTITIONER

## 2024-06-06 PROCEDURE — 90472 IMMUNIZATION ADMIN EACH ADD: CPT | Performed by: NURSE PRACTITIONER

## 2024-06-06 PROCEDURE — 90746 HEPB VACCINE 3 DOSE ADULT IM: CPT | Performed by: NURSE PRACTITIONER

## 2024-06-06 RX ORDER — FAMOTIDINE 20 MG/1
20 TABLET, FILM COATED ORAL 2 TIMES DAILY PRN
Qty: 90 TABLET | Refills: 1 | Status: SHIPPED | OUTPATIENT
Start: 2024-06-06

## 2024-06-06 ASSESSMENT — ANXIETY QUESTIONNAIRES
7. FEELING AFRAID AS IF SOMETHING AWFUL MIGHT HAPPEN: SEVERAL DAYS
6. BECOMING EASILY ANNOYED OR IRRITABLE: MORE THAN HALF THE DAYS
5. BEING SO RESTLESS THAT IT IS HARD TO SIT STILL: SEVERAL DAYS
3. WORRYING TOO MUCH ABOUT DIFFERENT THINGS: MORE THAN HALF THE DAYS
8. IF YOU CHECKED OFF ANY PROBLEMS, HOW DIFFICULT HAVE THESE MADE IT FOR YOU TO DO YOUR WORK, TAKE CARE OF THINGS AT HOME, OR GET ALONG WITH OTHER PEOPLE?: SOMEWHAT DIFFICULT
2. NOT BEING ABLE TO STOP OR CONTROL WORRYING: MORE THAN HALF THE DAYS
4. TROUBLE RELAXING: MORE THAN HALF THE DAYS
IF YOU CHECKED OFF ANY PROBLEMS ON THIS QUESTIONNAIRE, HOW DIFFICULT HAVE THESE PROBLEMS MADE IT FOR YOU TO DO YOUR WORK, TAKE CARE OF THINGS AT HOME, OR GET ALONG WITH OTHER PEOPLE: SOMEWHAT DIFFICULT
GAD7 TOTAL SCORE: 11
GAD7 TOTAL SCORE: 11
1. FEELING NERVOUS, ANXIOUS, OR ON EDGE: SEVERAL DAYS
GAD7 TOTAL SCORE: 11
7. FEELING AFRAID AS IF SOMETHING AWFUL MIGHT HAPPEN: SEVERAL DAYS

## 2024-06-06 NOTE — PATIENT INSTRUCTIONS
"Preventive Care Advice   This is general advice we often give to help people stay healthy. Your care team may have specific advice just for you. Please talk to your care team about your own preventive care needs.  Lifestyle  Exercise at least 150 minutes each week (30 minutes a day, 5 days a week).  Do muscle strengthening activities 2 days a week. These help control your weight and prevent disease.  No smoking.  Wear sunscreen to prevent skin cancer.  Have your home tested for radon every 2 to 5 years. Radon is a colorless, odorless gas that can harm your lungs. To learn more, go to www.health.[facility].mn. and search for \"Radon in Homes.\"  Keep guns unloaded and locked up in a safe place like a safe or gun vault, or, use a gun lock and hide the keys. Always lock away bullets separately. To learn more, visit BioArray.mn.gov and search for \"safe gun storage.\"  Nutrition  Eat 5 or more servings of fruits and vegetables each day.  Try wheat bread, brown rice and whole grain pasta (instead of white bread, rice, and pasta).  Get enough calcium and vitamin D. Check the label on foods and aim for 100% of the RDA (recommended daily allowance).  Regular exams  Have a dental exam and cleaning every 6 months.  See your health care team every year to talk about:  Any changes in your health.  Any medicines your care team has prescribed.  Preventive care, family planning, and ways to prevent chronic diseases.  Shots (vaccines)   HPV shots (up to age 26), if you've never had them before.  Hepatitis B shots (up to age 59), if you've never had them before.  COVID-19 shot: Get this shot when it's due.  Flu shot: Get a flu shot every year.  Tetanus shot: Get a tetanus shot every 10 years.  Pneumococcal, hepatitis A, and RSV shots: Ask your care team if you need these based on your risk.  Shingles shot (for age 50 and up).  General health tests  Diabetes screening:  Starting at age 35, Get screened for diabetes at least every 3 years.  If " you are younger than age 35, ask your care team if you should be screened for diabetes.  Cholesterol test: At age 39, start having a cholesterol test every 5 years, or more often if advised.  Bone density scan (DEXA): At age 50, ask your care team if you should have this scan for osteoporosis (brittle bones).  Hepatitis C: Get tested at least once in your life.  Abdominal aortic aneurysm screening: Talk to your doctor about having this screening if you:  Have ever smoked; and  Are biologically male; and  Are between the ages of 65 and 75.  STIs (sexually transmitted infections)  Before age 24: Ask your care team if you should be screened for STIs.  After age 24: Get screened for STIs if you're at risk. You are at risk for STIs (including HIV) if:  You are sexually active with more than one person.  You don't use condoms every time.  You or a partner was diagnosed with a sexually transmitted infection.  If you are at risk for HIV, ask about PrEP medicine to prevent HIV.  Get tested for HIV at least once in your life, whether you are at risk for HIV or not.  Cancer screening tests  Cervical cancer screening: If you have a cervix, begin getting regular cervical cancer screening tests at age 21. Most people who have regular screenings with normal results can stop after age 65. Talk about this with your provider.  Breast cancer scan (mammogram): If you've ever had breasts, begin having regular mammograms starting at age 40. This is a scan to check for breast cancer.  Colon cancer screening: It is important to start screening for colon cancer at age 45.  Have a colonoscopy test every 10 years (or more often if you're at risk) Or, ask your provider about stool tests like a FIT test every year or Cologuard test every 3 years.  To learn more about your testing options, visit: www.[employer] Gateways/922198.pdf.  For help making a decision, visit: rosario/sv61239.  Prostate cancer screening test: If you have a prostate and are age 55  to 69, ask your provider if you would benefit from a yearly prostate cancer screening test.  Lung cancer screening: If you are a current or former smoker age 50 to 80, ask your care team if ongoing lung cancer screenings are right for you.  For informational purposes only. Not to replace the advice of your health care provider. Copyright   2023 [employer]. All rights reserved. Clinically reviewed by the [facility] Transitions Program. T[employer] 329178 - REV 04/24.    Preventing Falls: Care Instructions  Injuries and health problems such as trouble walking or poor eyesight can increase your risk of falling. So can some medicines. But there are things you can do to help prevent falls. You can exercise to get stronger. You can also arrange your home to make it safer.    Talk to your doctor about the medicines you take. Ask if any of them increase the risk of falls and whether they can be changed or stopped.   Try to exercise regularly. It can help improve your strength and balance. This can help lower your risk of falling.     Practice fall safety and prevention.    Wear low-heeled shoes that fit well and give your feet good support. Talk to your doctor if you have foot problems that make this hard.  Carry a cellphone or wear a medical alert device that you can use to call for help.  Use stepladders instead of chairs to reach high objects. Don't climb if you're at risk for falls. Ask for help, if needed.  Wear the correct eyeglasses, if you need them.    Make your home safer.    Remove rugs, cords, clutter, and furniture from walkways.  Keep your house well lit. Use night-lights in hallways and bathrooms.  Install and use sturdy handrails on stairways.  Wear nonskid footwear, even inside. Don't walk barefoot or in socks without shoes.    Be safe outside.    Use handrails, curb cuts, and ramps whenever possible.  Keep your hands free by using a shoulder bag or backpack.  Try to walk in well-lit  "areas. Watch out for uneven ground, changes in pavement, and debris.  Be careful in the winter. Walk on the grass or gravel when sidewalks are slippery. Use de-icer on steps and walkways. Add non-slip devices to shoes.    Put grab bars and nonskid mats in your shower or tub and near the toilet. Try to use a shower chair or bath bench when bathing.   Get into a tub or shower by putting in your weaker leg first. Get out with your strong side first. Have a phone or medical alert device in the bathroom with you.   Where can you learn more?  Go to https://www.Lomography.net/patiented  Enter G117 in the search box to learn more about \"Preventing Falls: Care Instructions.\"  Current as of: July 17, 2023               Content Version: 14.0    1405-1015 [employer].   Care instructions adapted under license by your healthcare professional. If you have questions about a medical condition or this instruction, always ask your healthcare professional. [employer] disclaims any warranty or liability for your use of this information.      Relationships for Good Health  Relationships are important for our health and happiness. Social isolation, loneliness and lack of support are bad for your health. Studies show that loneliness can harm health and limit your life span as much as high blood pressure and smoking.   Take some time to reflect on your relationships. Then answer these questions:  Are there people in your life that cause you stress or drain your energy? What can you do to set limits?  ________________________________________________________________________________________________________________________________________________________________________________________________________________________________________________________________________________________________________________________________________________  Who do you enjoy spending time with? Who can you go to for " support?  ________________________________________________________________________________________________________________________________________________________________________________________________________________________________________________________________________________________________________________________________________________  What can you do to improve your relationships with others?  __________________________________________________________________________________________________________________________________________________________________________________________________________________  ______________________________________________________________________________________________________________________________  What do you like most about your relationships with others?  ________________________________________________________________________________________________________________________________________________________________________________________________________________________________________________________________________________________________________________________________________________  My goal: ______________________________________________________________________  I will ______________________________________________________________________________________________________________________________________________________________________________________________    For informational purposes only. Not to replace the advice of your health care provider. Copyright   2018 [facility]. All rights reserved. Clinically reviewed by [facility]  Team. SMARTworks 078417 - Rev 04/21.    Learning About Depression Screening  What is depression screening?  Depression screening is a way to see if you have depression symptoms. It may be done by a doctor or counselor. It's often part of a routine checkup. That's because your mental health is just as important as your  "physical health.  Depression is a mental health condition that affects how you feel, think, and act. You may:  Have less energy.  Lose interest in your daily activities.  Feel sad and grouchy for a long time.  Depression is very common. It affects people of all ages.  Many things can lead to depression. Some people become depressed after they have a stroke or find out they have a major illness like cancer or heart disease. The death of a loved one or a breakup may lead to depression. It can run in families. Most experts believe that a combination of inherited genes and stressful life events can cause it.  What happens during screening?  You may be asked to fill out a form about your depression symptoms. You and the doctor will discuss your answers. The doctor may ask you more questions to learn more about how you think, act, and feel.  What happens after screening?  If you have symptoms of depression, your doctor will talk to you about your options.  Doctors usually treat depression with medicines or counseling. Often, combining the two works best. Many people don't get help because they think that they'll get over the depression on their own. But people with depression may not get better unless they get treatment.  The cause of depression is not well understood. There may be many factors involved. But if you have depression, it's not your fault.  A serious symptom of depression is thinking about death or suicide. If you or someone you care about talks about this or about feeling hopeless, get help right away.  It's important to know that depression can be treated. Medicine, counseling, and self-care may help.  Where can you learn more?  Go to https://www.GeekStatus.net/patiented  Enter T185 in the search box to learn more about \"Learning About Depression Screening.\"  Current as of: June 24, 2023               Content Version: 14.0    3198-8550 [employer].   Care instructions adapted under license by " your healthcare professional. If you have questions about a medical condition or this instruction, always ask your healthcare professional. Health[employer] Incorporated disclaims any warranty or liability for your use of this information.

## 2024-06-06 NOTE — PROGRESS NOTES
[facility]  JANY Riggs CNP, Family Medicine  Jun 6, 2024      Assessment & Plan     Routine general medical examination at a health care facility  - PRIMARY CARE FOLLOW-UP SCHEDULING  - PRIMARY CARE FOLLOW-UP SCHEDULING    Type 2 diabetes mellitus with hyperglycemia, without long-term current use of insulin (H)  Has been well controlled on Bydureon Bcise, Aspirin, atorvastatin  - Adult Eye  Referral  - Albumin Random Urine Quantitative with Creat Ratio  - Hemoglobin A1c  - Albumin Random Urine Quantitative with Creat Ratio  - Hemoglobin A1c    Gastroesophageal reflux disease without esophagitis  Chronic, triggered by foods  Avoid foods that trigger it  - famotidine (PEPCID) 20 MG tablet  Dispense: 90 tablet; Refill: 1    Nodule of finger, bilateral  - Orthopedic  Referral    CKD stage G2/A3, GFR 60-89 and albumin creatinine ratio >300 mg/g  Stop NSAIDS,   Keep well control of HTN and DM2  - Albumin Random Urine Quantitative with Creat Ratio  - Adult Nephrology  Referral  - Albumin Random Urine Quantitative with Creat Ratio    Colon cancer screening  - Colonoscopy Screening  Referral    Need for vaccination  - HEPATITIS B, ADULT 20+ (ENGERIX-B/RECOMBIVAX HB)  - COVID-19 12+ (2023-24) (PFIZER)  - ZOSTER RECOMBINANT ADJUVANTED (SHINGRIX)    Smoker  Advise on smoking cessation  - nicotine (NICORETTE) 2 MG gum  Dispense: 50 each; Refill: 1    Essential hypertension  Well controlled  On Coreg and Losartan   - carvedilol (COREG) 25 MG tablet -take 0.5 tablet (12.5mg) daily Dispense: 180 tablet; Refill: 3  - losartan (COZAAR) 25 MG tablet  Dispense: 90 tablet; Refill: 1    Microalbuminuria  - Albumin Random Urine Quantitative with Creat Ratio  - Adult Nephrology  Referral    Recurrent major depressive episodes (H24)  Feels symptoms are well managed   Continue Lexapro    Mixed hyperlipidemia  Stable  On  Atorvastatin    History of electrophysiologic study for VT which was not found on 2019  Ventricular tachycardia, no-sustained  carvedilol (COREG) 25 MG tablet Dispense: 180 tablet; Refill: 3   History CVA  On atorvastatin, aspirin, and plavix  On Coreg and losartan to control BP goal <130/80             Counseling  Appropriate preventive services were discussed with this patient, including applicable screening as appropriate for fall prevention, nutrition, physical activity, Tobacco-use cessation, weight loss and cognition.  Checklist reviewing preventive services available has been given to the patient.  Reviewed patient's diet, addressing concerns and/or questions.   She is at risk for lack of exercise and has been provided with information to increase physical activity for the benefit of her well-being.   Patient is at risk for social isolation and has been provided with information about the benefit of social connection.   The patient was instructed to see the dentist every 6 months.   The patient's PHQ-9 score is consistent with moderate depression. She was provided with information regarding depression.           Manuel Shanks is a 58 year old, presenting for the following:  Annual Visit        2024     2:25 PM   Additional Questions   Roomed by Koko AYALA RN        Health Care Directive  Patient does not have a Health Care Directive or Living Will: Discussed advance care planning with patient; information given to patient to review.    HPI    Concerns:    Right index finger is hurting- pain relieved by tylenol but pain returns- feels a bone when touching it    Gained 6lbs since last check- was eating more fast foods    Has a lot of protein in urine- Five years ago, she ended up drinking a little bit of bleach as she confused the juice cup with the cup of bleach- she spitted out but she ended up swallowing a  little bit.     Smokin packs a week. Smoking since age 15 - light smoker.  Does not  qualify for lung cancer screening - smoking hx <20 pack per year              6/5/2024   General Health   How would you rate your overall physical health? (!) DECLINE   Feel stress (tense, anxious, or unable to sleep) To some extent   (!) STRESS CONCERN      6/5/2024   Nutrition   Three or more servings of calcium each day? (!) NO   Diet: Diabetic   How many servings of fruit and vegetables per day? (!) 2-3   How many sweetened beverages each day? (!) 2         6/5/2024   Exercise   Days per week of moderate/strenous exercise 3 days   Average minutes spent exercising at this level 50 min         6/5/2024   Social Factors   Frequency of gathering with friends or relatives Never   Worry food won't last until get money to buy more No   Food not last or not have enough money for food? No   Do you have housing?  Yes   Are you worried about losing your housing? Patient declined   Lack of transportation? Patient declined   Unable to get utilities (heat,electricity)? No   (!) SOCIAL CONNECTIONS CONCERN      6/6/2024   Fall Risk   Gait Speed Test (Document in seconds) 4.68   Gait Speed Test Interpretation Less than or equal to 5.00 seconds - PASS          6/5/2024   Dental   Dentist two times every year? (!) NO         6/5/2024   TB Screening   Were you born outside of the [place]? No       Today's PHQ-9 Score:       6/5/2024     2:51 PM   PHQ-9 SCORE   PHQ-9 Total Score MyChart 12 (Moderate depression)   PHQ-9 Total Score 12         6/5/2024   Substance Use   If I could quit smoking, I would Completely agree   I want to quit somking, worry about health affects Somewhat disagree   Willing to make a plan to quit smoking Somewhat disagree   Willing to cut down before quitting Completely agree   Alcohol more than 3/day or more than 7/wk No   Do you use any other substances recreationally? No     Social History     Tobacco Use    Smoking status: Every Day     Current packs/day: 0.00     Types: Cigarettes     Last attempt to quit:  "2019     Years since quittin.8    Smokeless tobacco: Never    Tobacco comments:     Has quit multiple times   Vaping Use    Vaping status: Never Used   Substance Use Topics    Alcohol use: Not Currently    Drug use: Not Currently           2024   LAST FHS-7 RESULTS   1st degree relative breast or ovarian cancer No   Any relative bilateral breast cancer No   Any male have breast cancer No   Any ONE woman have BOTH breast AND ovarian cancer No   Any woman with breast cancer before 50yrs No   2 or more relatives with breast AND/OR ovarian cancer No   2 or more relatives with breast AND/OR bowel cancer No                2024   STI Screening   New sexual partner(s) since last STI/HIV test? No     History of abnormal Pap smear: Status post hysterectomy with removal of cervix and no history of CIN2 or greater or cervical cancer. Health Maintenance and Surgical History updated.       ASCVD Risk   The ASCVD Risk score (Gabby JERRY, et al., 2019) failed to calculate for the following reasons:    The patient has a prior MI or stroke diagnosis           Reviewed and updated as needed this visit by Provider   Tobacco  Allergies  Meds  Problems  Med Hx  Surg Hx  Fam Hx                     Objective    Exam  /76 (BP Location: Left arm, Patient Position: Sitting, Cuff Size: Adult Regular)   Pulse 74   Temp 98.5  F (36.9  C) (Tympanic)   Resp 10   Ht 1.626 m (5' 4\")   Wt 98.1 kg (216 lb 3.2 oz)   LMP  (LMP Unknown)   SpO2 98%   BMI 37.11 kg/m     Estimated body mass index is 37.11 kg/m  as calculated from the following:    Height as of this encounter: 1.626 m (5' 4\").    Weight as of this encounter: 98.1 kg (216 lb 3.2 oz).    Physical Exam  GENERAL: alert and no distress  EYES: Eyes grossly normal to inspection, PERRL and conjunctivae and sclerae normal  HENT: ear canals and TM's normal, nose and mouth without ulcers or lesions  NECK: no adenopathy, no asymmetry, masses, or scars  RESP: " lungs clear to auscultation - no rales, rhonchi or wheezes  CV: regular rate and rhythm, normal S1 S2, no S3 or S4, no murmur, click or rub, no peripheral edema  ABDOMEN: soft, nontender, no hepatosplenomegaly, no masses and bowel sounds normal  MS: no gross musculoskeletal defects noted, no edema  SKIN: no suspicious lesions or rashes  NEURO: Normal strength and tone, mentation intact and speech normal  PSYCH: mentation appears normal, affect normal/bright        Signed Electronically by: JANY Riggs CNP    Answers submitted by the patient for this visit:  Patient Health Questionnaire (Submitted on 6/5/2024)  If you checked off any problems, how difficult have these problems made it for you to do your work, take care of things at home, or get along with other people?: Somewhat difficult  PHQ9 TOTAL SCORE: 12  PAMELA-7 (Submitted on 6/6/2024)  PAMELA 7 TOTAL SCORE: 11

## 2024-06-07 ENCOUNTER — TELEPHONE (OUTPATIENT)
Dept: MULTI SPECIALTY CLINIC | Facility: CLINIC | Age: 59
End: 2024-06-07
Payer: COMMERCIAL

## 2024-06-07 LAB
CREAT UR-MCNC: 285 MG/DL
MICROALBUMIN UR-MCNC: >4400 MG/L
MICROALBUMIN/CREAT UR: NORMAL MG/G{CREAT}

## 2024-06-07 NOTE — TELEPHONE ENCOUNTER
[facility] Call Center    Phone Message    May a detailed message be left on voicemail: yes     Reason for Call: Other: Please place lab order per Dr. Goldberg. Thanks.     Action Taken: Other:   [facility] NEPHROLOGY    Travel Screening: Not Applicable     Date of Service:

## 2024-06-11 ENCOUNTER — PATIENT OUTREACH (OUTPATIENT)
Dept: GASTROENTEROLOGY | Facility: CLINIC | Age: 59
End: 2024-06-11
Payer: COMMERCIAL

## 2024-06-18 ENCOUNTER — TELEPHONE (OUTPATIENT)
Dept: GASTROENTEROLOGY | Facility: CLINIC | Age: 59
End: 2024-06-18
Payer: COMMERCIAL

## 2024-06-18 DIAGNOSIS — Z12.11 COLON CANCER SCREENING: Primary | ICD-10-CM

## 2024-06-18 NOTE — TELEPHONE ENCOUNTER
"Endoscopy Scheduling Screen    Have you had a positive Covid test in the last 14 days?  No    What is your communication preference for Instructions and/or Bowel Prep?   MyChart    What insurance is in the chart?  Other:  SILVANA MILLARD    Ordering/Referring Provider: VENICE   (If ordering provider performs procedure, schedule with ordering provider unless otherwise instructed. )    BMI: Estimated body mass index is 37.11 kg/m  as calculated from the following:    Height as of 6/6/24: 1.626 m (5' 4\").    Weight as of 6/6/24: 98.1 kg (216 lb 3.2 oz).     Sedation Ordered  moderate sedation.   If patient BMI > 50 do not schedule in ASC.    If patient BMI > 45 do not schedule at [facility].    Are you taking methadone or Suboxone?  No    Have you had difficulties, pain, or discomfort during past endoscopy procedures?  No    Are you taking any prescription medications for pain 3 or more times per week?   NO, No RN review required.    Do you have a history of malignant hyperthermia?  No    (Females) Are you currently pregnant?   No     Have you been diagnosed or told you have pulmonary hypertension?   No    Do you have an LVAD?  No    Have you been told you have moderate to severe sleep apnea?  Yes (RN Review required for scheduling unless scheduling in Hospital.)    Have you been told you have COPD, asthma, or any other lung disease?  No    Do you have any heart conditions?  No     Have you ever had or are you waiting for an organ transplant?  No. Continue scheduling, no site restrictions.    Have you had a stroke or transient ischemic attack (TIA aka \"mini stroke\" in the last 6 months?   No    Have you been diagnosed with or been told you have cirrhosis of the liver?   No    Are you currently on dialysis?   No    Do you need assistance transferring?   No    BMI: Estimated body mass index is 37.11 kg/m  as calculated from the following:    Height as of 6/6/24: 1.626 m (5' 4\").    Weight as of 6/6/24: 98.1 kg (216 lb 3.2 " oz).     Is patients BMI > 40 and scheduling location [facility]?  No    Do you take an injectable medication for weight loss or diabetes (excluding insulin)?  No    Do you take the medication Naltrexone?  No    Do you take blood thinners?  No       Prep   Are you currently on dialysis or do you have chronic kidney disease?  Yes (Golytely Prep)    Do you have a diagnosis of diabetes?  Yes (Golytely Prep)    Do you have a diagnosis of cystic fibrosis (CF)?  No    On a regular basis do you go 3 -5 days between bowel movements?  No    BMI > 40?  No    Preferred Pharmacy:    [id number] - [place], [place] - [address] [place] [address]  Phone: [phone] Fax: [phone]      Final Scheduling Details     Procedure scheduled  Colonoscopy    Surgeon:  VEE     Date of procedure:  08/12/2024    Pre-OP / PAC:   No - Not required for this site.    Location  [facility] - Per order.    Sedation   Moderate Sedation - Per order.      Patient Reminders:   You will receive a call from a Nurse to review instructions and health history.  This assessment must be completed prior to your procedure.  Failure to complete the Nurse assessment may result in the procedure being cancelled.      On the day of your procedure, please designate an adult(s) who can drive you home stay with you for the next 24 hours. The medicines used in the exam will make you sleepy. You will not be able to drive.      You cannot take public transportation, ride share services, or non-medical taxi service without a responsible caregiver.  Medical transport services are allowed with the requirement that a responsible caregiver will receive you at your destination.  We require that drivers and caregivers are confirmed prior to your procedure.

## 2024-07-10 DIAGNOSIS — L65.9 ALOPECIA: ICD-10-CM

## 2024-07-10 RX ORDER — VITAMIN B COMPLEX
TABLET ORAL
Qty: 120 TABLET | Refills: 2 | Status: SHIPPED | OUTPATIENT
Start: 2024-07-10 | End: 2024-10-01

## 2024-07-17 DIAGNOSIS — F41.9 ANXIETY: ICD-10-CM

## 2024-07-17 RX ORDER — HYDROXYZINE HYDROCHLORIDE 25 MG/1
TABLET, FILM COATED ORAL
Qty: 30 TABLET | Refills: 3 | Status: SHIPPED | OUTPATIENT
Start: 2024-07-17

## 2024-07-22 RX ORDER — BISACODYL 5 MG/1
TABLET, DELAYED RELEASE ORAL
Qty: 4 TABLET | Refills: 0 | Status: SHIPPED | OUTPATIENT
Start: 2024-07-22 | End: 2024-08-02

## 2024-07-22 NOTE — TELEPHONE ENCOUNTER
Standard Golytely Bowel Prep recommended due to CKD noted.  and diabetes.  Instructions were sent via [employer]. Bowel prep was sent 7/22/2024 to [facility] [id number] [facility] - [place], [place] - [address] [place] [address] DB ESTRADA

## 2024-07-30 DIAGNOSIS — N18.2 CKD STAGE G2/A3, GFR 60-89 AND ALBUMIN CREATININE RATIO >300 MG/G: Primary | ICD-10-CM

## 2024-08-02 ENCOUNTER — TELEPHONE (OUTPATIENT)
Dept: GASTROENTEROLOGY | Facility: CLINIC | Age: 59
End: 2024-08-02
Payer: COMMERCIAL

## 2024-08-02 DIAGNOSIS — Z12.11 ENCOUNTER FOR SCREENING COLONOSCOPY: Primary | ICD-10-CM

## 2024-08-02 RX ORDER — POLYETHYLENE GLYCOL 3350 17 G/17G
POWDER, FOR SOLUTION ORAL
Qty: 238 G | Refills: 0 | Status: SHIPPED | OUTPATIENT
Start: 2024-08-02 | End: 2024-10-02

## 2024-08-02 RX ORDER — BISACODYL 5 MG/1
TABLET, DELAYED RELEASE ORAL
Qty: 4 TABLET | Refills: 0 | Status: SHIPPED | OUTPATIENT
Start: 2024-08-02 | End: 2024-10-02

## 2024-08-02 NOTE — TELEPHONE ENCOUNTER
Pre visit planning completed.      Procedure details:    Patient scheduled for Colonoscopy on 8/12/24.     Arrival time: 1025. Procedure time 1110    Facility location: [facility]; [address] [place], [place] [address]e [address] 2nd Floor, [place] [address]. Check in location: 2nd Floor at Surgery desk. -- moderate SUSIE (AHI of 16.6). Doesn't look like RN hard stop was reviewed. Will send for review now.     Sedation type: Conscious sedation -- Has anxiety. Last PAMELA 7 was 11 in June. Last colonoscopy was with MAC. Discuss sedation with the Pt.     Pre op exam needed? No.    Indication for procedure: Screening      Chart review:     Electronic implanted devices? No    Recent diagnosis of diverticulitis within the last 6 weeks? No      Medication review:    Diabetic? Yes. Not on diabetic medication -- verify.     Anticoagulants? Yes Clopidogrel (Plavix): Recommended HOLD 5 days before procedure.  Consult with your managing provider.    Weight loss medication/injectable? No GLP 1 medications per patient's medication list.  RN will verify with pre-assessment call.    NSAIDS? No NSAID medications per patient's medication list.  RN will verify with pre-assessment call.    Other medication HOLDING recommendations:  N/A      Prep for procedure:     Bowel prep recommendation: Standard Golytely. Bowel prep prescription sent to [facility] [id number] [facility] - [place], [place] - [address]  Due to: CKD noted.  and diabetes.     Prep instructions sent via LATTOshantelle Goss RN  Endoscopy Procedure Pre Assessment RN  [phone] option 4

## 2024-08-02 NOTE — TELEPHONE ENCOUNTER
"Pre assessment completed for upcoming procedure.   (Please see previous telephone encounter notes for complete details)    Patient returned call.       Procedure details:    Arrival time and facility location reviewed.    Pre op exam needed? No.    Designated  policy reviewed. Instructed to have someone stay 6 hours post procedure. Discussed sedation in detail with patient. CS vs MAC. Advised patient that prior procedure was done under anesthesia/MAC. Patient verbalized understanding and was fine proceeding with conscious sedation.      Medication review:    Medications reviewed. Please see supporting documentation below. Holding recommendations discussed (if applicable).   Blood thinner/Anti-platelet medication(s):  Clopidogrel (Plavix): Recommended HOLD 5 days before procedure.  Consult with your managing provider.  Injectable diabetic medication(s): Exenatide (Byetta, Bydureon).  Weekly dosing of medication.  Hold 7 days before procedure.  Follow up with managing provider.   Patient reports they take insulin weekly. Asked patient to confirm if it was insulin or something else because typically insulin is not given weekly - patient reassured writer that the injection was insulin. Advised patient to check the label please and let writer know exactly what the name was. Patient then spelled out \"BYDUREON BCISE\". Advised patient this is not insulin and Bydureon injections must be held for 7 days prior to the procedure. Patient verbalized understanding.  Patient also didn't think she was taking a blood thinner - advised that Plavix was on the medication list. Patient stated she doesn't know what she takes, she just takes what is in the pill pack. Advised patient that it appears Plavix was ordered due to history of stroke. Patient then reported that if Plavix was on her med list from this year, then she must be taking it. Advised patient again of the 5 day recommended hold for Plavix. Informed patient that she " must figure out which pill is the Plavix and stop taking that one specifically for 5 days due to the risk for bleeding during the procedure. Patient verbalized understanding that she will figure out which pill is Plavix. Also advised patient that Plavix should not be stopped before checking with prescribing provider, PCP, cardiologist, or whoever manages Plavix for her to ensure it is safe to hold for 5 days. Patient verbalized understanding.       Prep for procedure:     Standard Golytely was previously sent. Noted Bydreon per patient med list. Discussed with patient and they confirmed they do take Bydreon injections weekly (GLP-1 agonist).     Discussed with patient the need to updated to Low Volume Extended Golytely instructions. Advised patient to follow the updated instructions as sent by writer in [employer]. Patient verbalized understanding. Updated procedure prep instructions were reviewed with patient.      Updated prep instructions sent via [employer] (Low Volume Extended Golytely).     Updated prep prescriptions sent to [facility] [id number] [facility] - [place], [place] - [address] (Patient had not picked up the previously sent scripts - writer cancelled those).      Any additional information needed:  FYI patient very unaware of which medications she is taking and unsure if she will follow the holding recommendations as advised by RN.       Patient verbalized understanding and had no questions or concerns at this time.      Lydia Lemos RN  Endoscopy Procedure Pre Assessment   [phone] option 4

## 2024-08-02 NOTE — TELEPHONE ENCOUNTER
Called the Pt to complete Pre-Assessment. First Attempt. No answer, Left message.     Haily Goss RN   Endoscopy Procedure Pre Assessment RN

## 2024-08-05 ENCOUNTER — OFFICE VISIT (OUTPATIENT)
Dept: OPHTHALMOLOGY | Facility: CLINIC | Age: 59
End: 2024-08-05
Payer: COMMERCIAL

## 2024-08-05 DIAGNOSIS — E11.65 TYPE 2 DIABETES MELLITUS WITH HYPERGLYCEMIA, WITH LONG-TERM CURRENT USE OF INSULIN (H): ICD-10-CM

## 2024-08-05 DIAGNOSIS — H52.13 MYOPIA OF BOTH EYES WITH ASTIGMATISM AND PRESBYOPIA: ICD-10-CM

## 2024-08-05 DIAGNOSIS — H52.4 MYOPIA OF BOTH EYES WITH ASTIGMATISM AND PRESBYOPIA: ICD-10-CM

## 2024-08-05 DIAGNOSIS — H25.813 COMBINED FORMS OF AGE-RELATED CATARACT OF BOTH EYES: ICD-10-CM

## 2024-08-05 DIAGNOSIS — E11.9 TYPE 2 DIABETES MELLITUS WITHOUT COMPLICATION, WITHOUT LONG-TERM CURRENT USE OF INSULIN (H): ICD-10-CM

## 2024-08-05 DIAGNOSIS — H52.203 MYOPIA OF BOTH EYES WITH ASTIGMATISM AND PRESBYOPIA: ICD-10-CM

## 2024-08-05 DIAGNOSIS — Z79.4 TYPE 2 DIABETES MELLITUS WITH HYPERGLYCEMIA, WITH LONG-TERM CURRENT USE OF INSULIN (H): ICD-10-CM

## 2024-08-05 DIAGNOSIS — H53.461 RIGHT HOMONYMOUS HEMIANOPSIA: Primary | ICD-10-CM

## 2024-08-05 PROCEDURE — 92015 DETERMINE REFRACTIVE STATE: CPT | Performed by: OPHTHALMOLOGY

## 2024-08-05 PROCEDURE — 92004 COMPRE OPH EXAM NEW PT 1/>: CPT | Performed by: OPHTHALMOLOGY

## 2024-08-05 ASSESSMENT — REFRACTION_MANIFEST
OS_SPHERE: -0.75
OD_SPHERE: -1.75
OS_ADD: +2.50
OD_AXIS: 103
OD_ADD: +2.50
OS_AXIS: 090
OS_CYLINDER: +1.25
OD_CYLINDER: +2.00

## 2024-08-05 ASSESSMENT — REFRACTION_WEARINGRX
OD_AXIS: 105
OS_ADD: +2.50
OS_SPHERE: -1.00
OS_CYLINDER: +1.25
OD_CYLINDER: +2.00
OD_SPHERE: -1.75
OD_ADD: +2.50
OS_AXIS: 079
SPECS_TYPE: BIFOCAL

## 2024-08-05 ASSESSMENT — EXTERNAL EXAM - LEFT EYE: OS_EXAM: NORMAL

## 2024-08-05 ASSESSMENT — VISUAL ACUITY
OS_CC: 20/30
METHOD: SNELLEN - LINEAR
OD_CC: 20/25
OS_CC+: -1
CORRECTION_TYPE: GLASSES

## 2024-08-05 ASSESSMENT — TONOMETRY
OD_IOP_MMHG: 18
IOP_METHOD: TONOPEN
OS_IOP_MMHG: 18

## 2024-08-05 ASSESSMENT — CONF VISUAL FIELD
OS_INFERIOR_NASAL_RESTRICTION: 1
METHOD: COUNTING FINGERS
OD_SUPERIOR_TEMPORAL_RESTRICTION: 1
OD_INFERIOR_TEMPORAL_RESTRICTION: 1
OS_SUPERIOR_NASAL_RESTRICTION: 3

## 2024-08-05 ASSESSMENT — CUP TO DISC RATIO
OD_RATIO: 0.1
OS_RATIO: 0.1

## 2024-08-05 ASSESSMENT — EXTERNAL EXAM - RIGHT EYE: OD_EXAM: NORMAL

## 2024-08-05 ASSESSMENT — SLIT LAMP EXAM - LIDS
COMMENTS: NORMAL
COMMENTS: NORMAL

## 2024-08-05 NOTE — NURSING NOTE
Chief Complaints and History of Present Illnesses   Patient presents with    Diabetic Eye Exam     Chief Complaint(s) and History of Present Illness(es)       Diabetic Eye Exam               Comments    Pt comes to the clinic for a diabetic eye exam. She states that she is more dependent on her glasses than she used to be. She feels her vision has worsened since her last exam. She also states that her lenses keep falling out of the frames, and she is unable to wear them at present. She has been wearing OTC readers for now and is hopeful for an updated prescription today.     Pt feels that her eyelids often stick together. She feels the eyes are dry and she has to frequently rub them. She is not using any drops at present, but would like a recommendation on best drops to use.     Lab Results       Component                Value               Date                       A1C                      6.8                 06/06/2024                 A1C                      6.9                 02/20/2024                 A1C                      7.2                 03/24/2023                 A1C                      6.6                 04/26/2021                 A1C                      6.3                 09/09/2020

## 2024-08-06 NOTE — PROGRESS NOTES
HPI    Pt comes to the clinic for a diabetic eye exam. She states that she is more dependent on her glasses than she used to be. She feels her vision has worsened since her last exam. She also states that her lenses keep falling out of the frames, and she is unable to wear them at present. She has been wearing OTC readers for now and is hopeful for an updated prescription today.     Pt feels that her eyelids often stick together. She feels the eyes are dry and she has to frequently rub them. She is not using any drops at present, but would like a recommendation on best drops to use.     Lab Results       Component                Value               Date                       A1C                      6.8                 06/06/2024                 A1C                      6.9                 02/20/2024                 A1C                      7.2                 03/24/2023                 A1C                      6.6                 04/26/2021                 A1C                      6.3                 09/09/2020             Last edited by Sherlyn Hernández on 8/5/2024  2:16 PM.         Review of systems for the eyes was negative other than the pertinent positives/negatives listed in the HPI.      Assessment & Plan    HPI:  Rimma Sarmiento is a 59 year old female with history of HLD, HTN, T2DM,CVA, right homonymous hemianopia (noted from prior CVA since at least 12/12/13) presents for dilated fundus exam. She is wondering if she will be able to drive again. She feels her vision is decreased since last exam. Also notes eyelids sticking together.       POHx:  right homonymous hemianopia  PMHx: HLD, HTN, T2DM,CVA  Current Medications:   Current Outpatient Medications   Medication Sig Dispense Refill    aspirin 81 MG EC tablet Take 1 tablet (81 mg) by mouth daily 90 tablet 3    atorvastatin (LIPITOR) 80 MG tablet Take 1 tablet (80 mg) by mouth daily 90 tablet 3    bisacodyl (DULCOLAX) 5 MG EC tablet Two days prior to  procedure take two (2) tablets at 10am. One day prior to procedure take two (2) tablets at 10am 4 tablet 0    blood glucose (NO BRAND SPECIFIED) lancets standard Use to test blood sugar 3 times daily or as directed. 100 each 0    blood glucose (NO BRAND SPECIFIED) test strip Use to test blood sugar 3 times daily or as directed. To accompany: Blood Glucose Monitor Brands: per insurance. 300 strip 3    blood glucose (NO BRAND SPECIFIED) test strip Use to test blood sugar 3  times daily or as directed. 100 strip 0    blood glucose monitoring (NO BRAND SPECIFIED) meter device kit Use to test blood sugar  3 times times daily or as directed. 1 kit 0    blood glucose monitoring (SOFTCLIX) lancets USE TO TEST BLOOD SUGAR 3 TIMES DAILY OR AS DIRECTED.      Blood Glucose Monitoring Suppl (FIFTY50 GLUCOSE METER 2.0) w/Device KIT Check blood sugar  twice a day 1 kit 0    carvedilol (COREG) 25 MG tablet Take 0.5 tablets (12.5 mg) by mouth daily 180 tablet 3    clopidogrel (PLAVIX) 75 MG tablet Take 1 tablet (75 mg) by mouth daily 90 tablet 3    diclofenac (VOLTAREN) 1 % topical gel Apply 2 g to painful shoulder four times a day as needed      escitalopram (LEXAPRO) 10 MG tablet TAKE 1 TABLET DAILY BY MOUTH 90 tablet 3    exenatide ER (BYDUREON BCISE) 2 MG/0.85ML auto-injector Inject 2 mg Subcutaneous every 7 days 6 mL 1    famotidine (PEPCID) 20 MG tablet Take 1 tablet (20 mg) by mouth 2 times daily as needed (GERD) 90 tablet 1    fluocinolone acetonide (DERMA SMOOTHE/FS BODY) 0.01 % external oil Apply topically 2 times daily 118.28 mL 4    Fluocinolone Acetonide Scalp (DERMA-SMOOTHE/FS SCALP) 0.01 % OIL oil Apply once daily for 6 weeks to scalp, then drop down to using 3 times weekly 60 mL 3    hydrOXYzine HCl (ATARAX) 25 MG tablet TAKE 1 TABLET(25 MG) BY MOUTH EVERY NIGHT AS NEEDED FOR ANXIETY 30 tablet 3    losartan (COZAAR) 25 MG tablet Take 1 tablet (25 mg) by mouth daily 90 tablet 1    melatonin 3 MG tablet Take 1 tablet  (3 mg) by mouth nightly as needed for sleep 90 tablet 0    nicotine (NICORETTE) 2 MG gum Place 1 each (2 mg) inside cheek every hour as needed for nicotine withdrawal symptoms 50 each 1    nicotine (NICOTROL) 10 MG inhaler       polyethylene glycol (GOLYTELY) 236 g suspension One day prior to procedure at 3 pm fill container with water. Cover and shake until mixed well. Drink an 8 oz. glass of mixture every 10-15 minutes until the 1st half of the jug is gone. Place the remainder of the Golytely in the refrigerator. At 8 pm, drink the 2nd half of the jug of Golytely bowel prep. Drink an 8 oz. glass of Golytely every 10-15 minutes until the container of Golytely is gone. 4000 mL 0    polyethylene glycol (MIRALAX) 17 GM/Dose powder Take 1 capful (17g) of Miralax mixed with 8 oz of a clear liquid twice daily for 7 days prior to your scheduled procedure. 238 g 0    traZODone (DESYREL) 50 MG tablet Take  mg by mouth      vitamin D3 25 mcg (1000 units) tablet TAKE 4 TABS BY MOUTH DAILY 120 tablet 2    carboxymethylcellulose PF (CARBOXYMETHYLCELLULOSE SODIUM) 0.5 % ophthalmic solution Place 1 drop into both eyes 4 times daily as needed for dry eyes 70 each 11     No current facility-administered medications for this visit.     FHx: denies family history of ocular conditions   PSHx: denies history of ocular surgeries       CTA 9/3/19  Chronic infarction left medial occipital lobe and posterior temporal lobe inferiorly in the left posterior cerebral artery vascular distribution     Current Eye Medications:      Assessment & Plan:  (H53.461) Right homonymous hemianopsia  (primary encounter diagnosis)  Due to prior CVA in left medial occipital lobe/inferior temporal lobe  Explained in great detail the reason for patient's inability to drive due to right visual field loss      (E11.65,  Z79.4) Type 2 diabetes mellitus with hyperglycemia, with long-term current use of insulin (H)  Most recent HgBA1c 6.8 on 6/6/24  No  background diabetic retinopathy or neovascularization noted on today's exam.  Discussed ocular and systemic benefits of blood pressure and blood sugar control.  Return in 1 year for full exam with dilation or sooner if changes to vision.       (H52.13,  H52.203,  H52.4) Myopia of both eyes with astigmatism and presbyopia  Patient has minimal change in myopia but a copy of today's glasses prescription was given.  The patient may wish to update the glasses if the lenses are scratched or the frames are too small.  Presbyopia is difficulty seeing up close and is treated with bifocals or over the counter reading glasses    (H25.813) Combined forms of age-related cataract of both eyes  Mild cataracts are present and account for some of the patient's visual complaints. Discussed possible surgical intervention. Patient elects to observe for now. The patient will monitor for vision changes and contact us with any decrease in vision. Recheck next visit       Return in about 1 year (around 8/5/2025) for Annual Visit-v/t/d/MRx, 24-2 VF.        Avtar Rosa MD     Attending Physician Attestation:  Complete documentation of historical and exam elements from today's encounter can be found in the full encounter summary report (not reduplicated in this progress note).  I personally obtained the chief complaint(s) and history of present illness.  I confirmed and edited as necessary the review of systems, past medical/surgical history, family history, social history, and examination findings as documented by others; and I examined the patient myself.  I personally reviewed the relevant tests, images, and reports as documented above.  I formulated and edited as necessary the assessment and plan and discussed the findings and management plan with the patient and family. - Avtar Rosa MD

## 2024-08-11 RX ORDER — NALOXONE HYDROCHLORIDE 0.4 MG/ML
0.4 INJECTION, SOLUTION INTRAMUSCULAR; INTRAVENOUS; SUBCUTANEOUS
Status: CANCELLED | OUTPATIENT
Start: 2024-08-11

## 2024-08-11 RX ORDER — PROCHLORPERAZINE MALEATE 10 MG
10 TABLET ORAL EVERY 6 HOURS PRN
Status: CANCELLED | OUTPATIENT
Start: 2024-08-11

## 2024-08-11 RX ORDER — FLUMAZENIL 0.1 MG/ML
0.2 INJECTION, SOLUTION INTRAVENOUS
Status: CANCELLED | OUTPATIENT
Start: 2024-08-11 | End: 2024-08-12

## 2024-08-11 RX ORDER — NALOXONE HYDROCHLORIDE 0.4 MG/ML
0.2 INJECTION, SOLUTION INTRAMUSCULAR; INTRAVENOUS; SUBCUTANEOUS
Status: CANCELLED | OUTPATIENT
Start: 2024-08-11

## 2024-08-11 RX ORDER — ONDANSETRON 4 MG/1
4 TABLET, ORALLY DISINTEGRATING ORAL EVERY 6 HOURS PRN
Status: CANCELLED | OUTPATIENT
Start: 2024-08-11

## 2024-08-11 RX ORDER — ONDANSETRON 2 MG/ML
4 INJECTION INTRAMUSCULAR; INTRAVENOUS EVERY 6 HOURS PRN
Status: CANCELLED | OUTPATIENT
Start: 2024-08-11

## 2024-08-12 ENCOUNTER — TELEPHONE (OUTPATIENT)
Dept: GASTROENTEROLOGY | Facility: CLINIC | Age: 59
End: 2024-08-12
Payer: COMMERCIAL

## 2024-08-12 NOTE — TELEPHONE ENCOUNTER
Caller: octavia   Reason for Reschedule/Cancellation (please be detailed, any staff messages or encounters to note?):     COVID + -- 08/08/2024      Prior to reschedule please review:  Ordering Provider:Aminata Trujillo APRN CNP   Sedation Determined: Mod   Does patient have any ASC Exclusions, please identify?: n       Notes on Cancelled Procedure:  Procedure:Lower Endoscopy [Colonoscopy]   Date: 08/12/2024  Location:[facility]; [address] Floor, [place] [address]  Surgeon: Gabo         Rescheduled: yes   Procedure: Lower Endoscopy [Colonoscopy]  Date: 09/23/2024   Location: [facility]; [address] Floor, [place] [address]   Surgeon: Mark   Sedation Level Scheduled  mod          Reason for Sedation Level per order   Prep/Instructions updated and sent: mychart     Does patient need PAC or Pre -Op Rescheduled? : n       Send In - basket message to Panc - Jesús Pool if EUS procedure is canceled or rescheduled: [ N/A, YES or NO] n

## 2024-08-15 DIAGNOSIS — I10 ESSENTIAL HYPERTENSION: ICD-10-CM

## 2024-08-15 RX ORDER — LOSARTAN POTASSIUM 25 MG/1
25 TABLET ORAL DAILY
Qty: 90 TABLET | Refills: 1 | Status: SHIPPED | OUTPATIENT
Start: 2024-08-15

## 2024-08-16 ENCOUNTER — LAB (OUTPATIENT)
Dept: LAB | Facility: CLINIC | Age: 59
End: 2024-08-16
Payer: COMMERCIAL

## 2024-08-16 DIAGNOSIS — N18.2 CKD STAGE G2/A3, GFR 60-89 AND ALBUMIN CREATININE RATIO >300 MG/G: ICD-10-CM

## 2024-08-16 LAB
ALBUMIN MFR UR ELPH: 242 MG/DL
ALBUMIN SERPL BCG-MCNC: 3.7 G/DL (ref 3.5–5.2)
ANION GAP SERPL CALCULATED.3IONS-SCNC: 12 MMOL/L (ref 7–15)
BUN SERPL-MCNC: 8.7 MG/DL (ref 8–23)
CALCIUM SERPL-MCNC: 9.1 MG/DL (ref 8.8–10.4)
CHLORIDE SERPL-SCNC: 107 MMOL/L (ref 98–107)
CREAT SERPL-MCNC: 0.94 MG/DL (ref 0.51–0.95)
CREAT UR-MCNC: 259 MG/DL
CREAT UR-MCNC: 259 MG/DL
EGFRCR SERPLBLD CKD-EPI 2021: 69 ML/MIN/1.73M2
ERYTHROCYTE [DISTWIDTH] IN BLOOD BY AUTOMATED COUNT: 12.7 % (ref 10–15)
GLUCOSE SERPL-MCNC: 182 MG/DL (ref 70–99)
HCO3 SERPL-SCNC: 21 MMOL/L (ref 22–29)
HCT VFR BLD AUTO: 42.3 % (ref 35–47)
HGB BLD-MCNC: 13.3 G/DL (ref 11.7–15.7)
MCH RBC QN AUTO: 24.6 PG (ref 26.5–33)
MCHC RBC AUTO-ENTMCNC: 31.4 G/DL (ref 31.5–36.5)
MCV RBC AUTO: 78 FL (ref 78–100)
MICROALBUMIN UR-MCNC: 1657 MG/L
MICROALBUMIN/CREAT UR: 639.77 MG/G CR (ref 0–25)
PHOSPHATE SERPL-MCNC: 2.9 MG/DL (ref 2.5–4.5)
PLATELET # BLD AUTO: 453 10E3/UL (ref 150–450)
POTASSIUM SERPL-SCNC: 3.8 MMOL/L (ref 3.4–5.3)
PROT/CREAT 24H UR: 0.93 MG/MG CR (ref 0–0.2)
RBC # BLD AUTO: 5.4 10E6/UL (ref 3.8–5.2)
SODIUM SERPL-SCNC: 140 MMOL/L (ref 135–145)
WBC # BLD AUTO: 7.1 10E3/UL (ref 4–11)

## 2024-08-16 PROCEDURE — 84156 ASSAY OF PROTEIN URINE: CPT

## 2024-08-16 PROCEDURE — 82570 ASSAY OF URINE CREATININE: CPT

## 2024-08-16 PROCEDURE — 82043 UR ALBUMIN QUANTITATIVE: CPT

## 2024-08-16 PROCEDURE — 36415 COLL VENOUS BLD VENIPUNCTURE: CPT

## 2024-08-16 PROCEDURE — 80069 RENAL FUNCTION PANEL: CPT

## 2024-08-16 PROCEDURE — 85027 COMPLETE CBC AUTOMATED: CPT

## 2024-08-20 ENCOUNTER — OFFICE VISIT (OUTPATIENT)
Dept: NEPHROLOGY | Facility: CLINIC | Age: 59
End: 2024-08-20
Attending: NURSE PRACTITIONER
Payer: COMMERCIAL

## 2024-08-20 VITALS
OXYGEN SATURATION: 95 % | DIASTOLIC BLOOD PRESSURE: 89 MMHG | SYSTOLIC BLOOD PRESSURE: 144 MMHG | HEART RATE: 60 BPM | WEIGHT: 208 LBS | BODY MASS INDEX: 35.7 KG/M2

## 2024-08-20 DIAGNOSIS — N18.2 CKD STAGE G2/A3, GFR 60-89 AND ALBUMIN CREATININE RATIO >300 MG/G: ICD-10-CM

## 2024-08-20 DIAGNOSIS — R80.9 MICROALBUMINURIA: ICD-10-CM

## 2024-08-20 PROCEDURE — 99204 OFFICE O/P NEW MOD 45 MIN: CPT | Performed by: INTERNAL MEDICINE

## 2024-08-20 ASSESSMENT — PAIN SCALES - GENERAL: PAINLEVEL: NO PAIN (0)

## 2024-08-20 NOTE — PATIENT INSTRUCTIONS
It was a pleasure taking care of you today.  I've included a brief summary of our discussion and care plan from today's visit below.      My recommendations are summarized as follows:  - Consider increasing losartan to 50 mg daily  - Consider starting a medication in the family of SGLT-2 inhibitors (such Jardiance of Farxiga)  - Check your blood pressure at home. Goal blood pressure is 130/80. Call my office if your blood pressure readings are running higher than that.    Who do I call with any questions after my visit?  Please be in touch if there are any further questions that arise following today's visit.  There are multiple ways to contact your nephrology care team.      During business hours, you may reach your Nephrology RN Care Coordinator, Jinny Mcdaniels at [phone]. For urgent/emergent questions after business hours, you may reach the on-call Nephrology Fellow by contacting the [facility]  at [phone]. You can always send a secure message through [employer].  [employer] messages are answered by your nurse or doctor typically within 24 hours.  Please allow extra time on weekends and holidays.      How do I schedule appointments?  To schedule or reschedule an appointment, please call [phone]. To schedule imaging please call [phone]. To schedule your lab appointment at [address] lab call [phone].    How will I get the results of any tests ordered?    You will receive all of your results.  If you have signed up for [employer], any tests ordered at your visit will be available to you after your physician reviews them.  Typically this takes 1-2 weeks.  If there are urgent results that require a change in your care plan, your physician or nurse will call you to discuss the next steps.      What is [employer]?  [employer] is a secure way for you to access all of your healthcare records from the [facility].  It is a web based computer program, so you can  sign on to it from any location.  It also allows you to send secure messages to your care team.  I recommend signing up for [employer] access if you have not already done so and are comfortable with using a computer.      Sincerely,    Pineda Goldberg MD  [facility]  Division of Nephrology and Hypertension

## 2024-08-20 NOTE — NURSING NOTE
Chief Complaint   Patient presents with    New Patient     CKD stage G2/A3, GFR 60-89 and albumin creatinine ratio >300 mg/g  Microalbuminuria         Vitals:    08/20/24 1006 08/20/24 1039   BP: (!) 161/87 (!) 144/89   BP Location: Left arm Left arm   Patient Position: Sitting Sitting   Cuff Size: Adult Large Adult Large   Pulse: 60    SpO2: 95%    Weight: 94.3 kg (208 lb)        Body mass index is 35.7 kg/m .      Elgin Lund MA

## 2024-08-20 NOTE — PROGRESS NOTES
Nephrology Outpatient Consult Note (08/20/2024)     Requesting Provider  Aminata Trujillo, JANY CNP  [address] [place] [address]    Chief Complaint/Reason for Visit  Proteinuria/albuminuria likely due to diabetic nephropathy    History of Present Illness  This is a 59-year-old female who was referred to our clinic for further workup and management of proteinuria.  Past medical history is notable for type 2 diabetes mellitus, CVA, hypertension, hyperlipidemia, and obesity.    With regards to her kidney function, we have serum creatinine measurements since 2020 ranging between 0.8 to 1.0 mg/dL.  Her most recent laboratory evaluation was earlier this month.  Serum creatinine was 0.9 mg/dL with an estimated GFR 69 L/min.  Serum sodium, potassium, chloride, calcium, phosphorus were normal.  Urine albumin to creatinine ratio measured 639 mg/g.  CBC showed a hemoglobin of 13.3.  No recent urinalysis.  Urine protein to creatinine ratio 0.9 g/g.  No recent abdominal imaging is available for me to review.    Overall, the patient is doing well today.  She does not have any acute health issues or concerns.  She has been doing well from a health perspective.  Her diabetes is reasonably well-controlled.  Her most recent hemoglobin A1c was 6.8%.  No shortness of breath.  No chest pain.  No abdominal pain.  No lower extremity edema.  No dysuria, gross hematuria.    The following portions of the patient's history were reviewed and updated as appropriate: allergies, current medications, past family history, past medical history, past social history, past surgical history, and problem list.    Subjective   Review of Systems  A comprehensive review of systems was performed. Pertinent positives and negatives are described above.    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), Anxiety, Cerebrovascular accident (CVA) due to bilateral embolism of  posterior cerebral arteries (H) (09/05/2019), CKD stage G2/A3, GFR 60-89 and albumin creatinine ratio >300 mg/g (01/18/2022), Combined forms of age-related cataract of both eyes (02/23/2022), cva (2014), Depression, Dizzy spells, DM (diabetes mellitus) (H), High cholesterol (09/17/2019), History of colonic polyps (09/24/2020), Hypertension, Hypertensive urgency (10/26/2013), Mixed hyperlipidemia (03/10/2010), Morbid obesity (H) (01/27/2004), Sleep apnea, Tobacco use disorder (10/03/2008), Type 2 diabetes mellitus (H) (10/23/2008), and Type 2 diabetes mellitus with hyperglycemia, with long-term current use of insulin (H) (10/23/2008).  Patient  has a past surgical history that includes Hysterectomy total abdominal, bilateral salpingo-oophorectomy, combined; Cholecystectomy; colonoscopy; and shoulder surgery (Bilateral).    Social History  Patient  reports that she has been smoking cigarettes. She has never used smokeless tobacco. She reports that she does not currently use alcohol. She reports that she does not currently use drugs.    Family History  family history includes Alcoholism in her brother and brother; Cerebrovascular Disease in her maternal grandmother and mother; Diabetes in her brother and mother; Hypertension in her mother.     Physical Examination  Blood pressure (!) 161/87, pulse 60, weight 94.3 kg (208 lb), SpO2 95%. Body mass index is 35.7 kg/m .  General:  Well appearing, well nourished in no distress.   Head:  normocephalic, atraumatic    Eyes: conjunctiva clear, EOM intact, PERRL.   Throat:  No erythema, exudates or lesions.   Neck:  neck supple, no masses  Heart:  RRR, no murmur   Lungs:  CTA bilaterally, no wheezes, rhonchi, rales.  Breathing unlabored.   Abdomen:  Soft, NT/ND, no HSM, no masses.   Extremities: No deformities, clubbing, cyanosis, or edema.   Neurologic: A/O x 3. No focal deficits. Gait WNL.     Objective   Pertinent Laboratory and Imaging Results  Most Recent Serum  Chemistries  Lab Results   Component Value Date    WBC 7.1 08/16/2024    HGB 13.3 08/16/2024    HCT 42.3 08/16/2024     (H) 08/16/2024     08/16/2024    POTASSIUM 3.8 08/16/2024    CHLORIDE 107 08/16/2024    CO2 21 (L) 08/16/2024    BUN 8.7 08/16/2024    CR 0.94 08/16/2024     (H) 08/16/2024    AST 21 02/20/2024    ALT 21 02/20/2024    ALKPHOS 82 02/20/2024     Most Recent Urinalysis  Lab Results   Component Value Date    COLOR Yellow 09/09/2020    APPEARANCE Clear 09/09/2020    URINEGLC Negative 09/09/2020    URINEBILI Negative 09/09/2020    URINEKETONE Negative 09/09/2020    SG 1.031 09/09/2020    URINEPH 6.0 09/09/2020    PROTEIN 300 (A) 09/09/2020    NITRITE Negative 09/09/2020    LEUKEST Negative 09/09/2020     Current Outpatient Medications   Medication Sig Dispense Refill    aspirin 81 MG EC tablet Take 1 tablet (81 mg) by mouth daily 90 tablet 3    atorvastatin (LIPITOR) 80 MG tablet Take 1 tablet (80 mg) by mouth daily 90 tablet 3    bisacodyl (DULCOLAX) 5 MG EC tablet Two days prior to procedure take two (2) tablets at 10am. One day prior to procedure take two (2) tablets at 10am 4 tablet 0    blood glucose (NO BRAND SPECIFIED) lancets standard Use to test blood sugar 3 times daily or as directed. 100 each 0    blood glucose (NO BRAND SPECIFIED) test strip Use to test blood sugar 3 times daily or as directed. To accompany: Blood Glucose Monitor Brands: per insurance. 300 strip 3    blood glucose (NO BRAND SPECIFIED) test strip Use to test blood sugar 3  times daily or as directed. 100 strip 0    blood glucose monitoring (NO BRAND SPECIFIED) meter device kit Use to test blood sugar  3 times times daily or as directed. 1 kit 0    blood glucose monitoring (SOFTCLIX) lancets USE TO TEST BLOOD SUGAR 3 TIMES DAILY OR AS DIRECTED.      Blood Glucose Monitoring Suppl (FIFTY50 GLUCOSE METER 2.0) w/Device KIT Check blood sugar  twice a day 1 kit 0    carboxymethylcellulose PF  (CARBOXYMETHYLCELLULOSE SODIUM) 0.5 % ophthalmic solution Place 1 drop into both eyes 4 times daily as needed for dry eyes 70 each 11    carvedilol (COREG) 25 MG tablet Take 0.5 tablets (12.5 mg) by mouth daily 180 tablet 3    clopidogrel (PLAVIX) 75 MG tablet Take 1 tablet (75 mg) by mouth daily 90 tablet 3    diclofenac (VOLTAREN) 1 % topical gel Apply 2 g to painful shoulder four times a day as needed      escitalopram (LEXAPRO) 10 MG tablet TAKE 1 TABLET DAILY BY MOUTH 90 tablet 3    exenatide ER (BYDUREON BCISE) 2 MG/0.85ML auto-injector Inject 2 mg Subcutaneous every 7 days 6 mL 1    famotidine (PEPCID) 20 MG tablet Take 1 tablet (20 mg) by mouth 2 times daily as needed (GERD) 90 tablet 1    fluocinolone acetonide (DERMA SMOOTHE/FS BODY) 0.01 % external oil Apply topically 2 times daily 118.28 mL 4    Fluocinolone Acetonide Scalp (DERMA-SMOOTHE/FS SCALP) 0.01 % OIL oil Apply once daily for 6 weeks to scalp, then drop down to using 3 times weekly 60 mL 3    hydrOXYzine HCl (ATARAX) 25 MG tablet TAKE 1 TABLET(25 MG) BY MOUTH EVERY NIGHT AS NEEDED FOR ANXIETY 30 tablet 3    losartan (COZAAR) 25 MG tablet TAKE 1 TABLET BY MOUTH EVERY DAY 90 tablet 1    melatonin 3 MG tablet Take 1 tablet (3 mg) by mouth nightly as needed for sleep 90 tablet 0    nicotine (NICORETTE) 2 MG gum Place 1 each (2 mg) inside cheek every hour as needed for nicotine withdrawal symptoms 50 each 1    nicotine (NICOTROL) 10 MG inhaler       polyethylene glycol (GOLYTELY) 236 g suspension One day prior to procedure at 3 pm fill container with water. Cover and shake until mixed well. Drink an 8 oz. glass of mixture every 10-15 minutes until the 1st half of the jug is gone. Place the remainder of the Golytely in the refrigerator. At 8 pm, drink the 2nd half of the jug of Golytely bowel prep. Drink an 8 oz. glass of Golytely every 10-15 minutes until the container of Golytely is gone. 4000 mL 0    polyethylene glycol (MIRALAX) 17 GM/Dose powder  Take 1 capful (17g) of Miralax mixed with 8 oz of a clear liquid twice daily for 7 days prior to your scheduled procedure. 238 g 0    traZODone (DESYREL) 50 MG tablet Take  mg by mouth      vitamin D3 25 mcg (1000 units) tablet TAKE 4 TABS BY MOUTH DAILY 120 tablet 2     No current facility-administered medications for this visit.        Assessment & Plan   # CKD stage II with albuminuria due to diabetes mellitus  # Type 2 diabetes mellitus  # Hypertension  # Tobacco use disorder  # History of CVA    The patient has CKD stage II manifesting with microalbuminuria as a result of longstanding type 2 diabetes mellitus.  Her creatinine measurements are normal.    With regards to her diabetes, I emphasized to the patient the importance of controlling the diabetes in order to slow down the progression of chronic kidney disease.  Her most recent hemoglobin A1c back in June was 6.8%.  I suggested to the patient initiation of is an SGLT 2 inhibitor such as Jardiance or Farxiga.  However, the patient is reluctant to start new medications.    With regards to her hypertension, her blood pressure is elevated in our office today.  First reading was 161/87 and a repeat reading was 144/89.  I indicated to the patient the importance of good blood pressure control in order to slow down the progression of chronic kidney disease.  I suggested increasing losartan from 25 to 50 mg orally daily.  However, the patient is reluctant to make the change.  I instructed the patient to buy blood pressure cuff and to start to monitor blood pressure at home.  Goal blood pressure should be 130/80 or lower than that.    Multiple times throughout the conversation, the patient pointed out to me that she would not want to change her medical regimen, add new medication, or change her pre-existing therapy. I educated the patient with regards to the diagnosis of chronic kidney disease, and the importance of controlling her diabetes and hypertension.   I also counseled her regarding smoking cessation.    My recommendations are the following:  - Consider increasing losartan to 50 mg daily  - Consider starting a medication in the family of SGLT-2 inhibitors (such Jardiance of Farxiga)  - Check your blood pressure at home. Goal blood pressure is 130/80. Call my office if your blood pressure readings are running higher than that.    Total time was of this encounter on the date of service was 40 minutes. All questions were answered to the patient's satisfaction.  Chart documentation was completed, in part, with [employer] voice-recognition software. Even though reviewed, some grammatical, spelling, and word errors may remain.    Orders placed today:  No orders of the defined types were placed in this encounter.    Pineda Goldberg MD  Division of Nephrology and Hypertension  [facility] ([facility])   Vocera Web Console ([facility])

## 2024-08-22 ENCOUNTER — APPOINTMENT (OUTPATIENT)
Dept: OPTOMETRY | Facility: CLINIC | Age: 59
End: 2024-08-22
Payer: COMMERCIAL

## 2024-08-22 PROCEDURE — 92341 FIT SPECTACLES BIFOCAL: CPT | Performed by: OPTOMETRIST

## 2024-09-14 NOTE — TELEPHONE ENCOUNTER
Rescheduled Colonoscopy  Due to  positive COVID 8/8/24      Pre visit planning completed.      Procedure details:    Patient scheduled for Colonoscopy on 9/23/24.     Arrival time: 0920. Procedure time 1000    Facility location: [facility]; [address], [place] [address]. Check in location: 2nd Floor at Surgery desk.    Sedation type: Conscious sedation  - moderate SUSIE approved by anesthesia Patient also aware of sedation expectation (see previous notes below)    Pre op exam needed? No.    Indication for procedure: screening       Chart review:     Electronic implanted devices? No    Recent diagnosis of diverticulitis within the last 6 weeks? No      Medication review:    Diabetic? Yes. Diabetic medication HOLDING recommendations: Diabetic injectables: Exenatide (Byetta, Bydureon).  Weekly dosing of medication.  Hold 7 days before procedure.  Follow up with managing provider.     Anticoagulants? Yes Clopidogrel (Plavix): Recommended HOLD 5 days before procedure.  Consult with your managing provider.    Weight loss medication/injectable? No. Patient is on GLP-1 medication but for DM (see above).    Other medication HOLDING recommendations:  N/A      Prep for procedure:     Bowel prep recommendation: Low Volume Extended Golytely. Previous prescribed. Did patient pick this up? If not, should switch to extended Golytely prep per new policy.   Due to: GLP-1 agonist medication noted in chart.     Prep instructions sent via [employer] - not sent. Awaiting to see if patient picked up prep.     Sherlyn Dejesus RN  Endoscopy Procedure Pre Assessment   [phone] option 2

## 2024-09-16 ENCOUNTER — TELEPHONE (OUTPATIENT)
Dept: GASTROENTEROLOGY | Facility: CLINIC | Age: 59
End: 2024-09-16
Payer: COMMERCIAL

## 2024-09-16 NOTE — TELEPHONE ENCOUNTER
"Please note:   \"Bowel prep recommendation: Low Volume Extended Golytely. Previous prescribed. Did patient pick this up? If not, should switch to extended Golytely prep per new policy.   Due to: GLP-1 agonist medication noted in chart.      Prep instructions sent via [employer] - not sent. Awaiting to see if patient picked up prep\"    --------------------------------------------------------------------------------------------------------------------      Attempted to contact patient in order to complete pre assessment questions.     No answer. Left message to return call to [phone] option 2.    Callback communication sent via [employer].    Corinne Kliber, RN  Endoscopy Procedure Pre Assessment RN  [phone] option 2    "

## 2024-09-16 NOTE — TELEPHONE ENCOUNTER
Caller: Rimma    Reason for Reschedule/Cancellation   (please be detailed, any staff messages or encounters to note?): Patient does not have a ride.      Prior to reschedule please review:  Ordering Provider:     Aminata Trujillo APRN CNP in [facility]/OB     Sedation Determined: moderate  Does patient have any ASC Exclusions, please identify?: n      Notes on Cancelled Procedure:  Procedure: Lower Endoscopy [Colonoscopy]   Date: 09/23/2024  Location: [facility]; [address], [place] [address]   Surgeon: Marshall      Rescheduled: Yes,   Procedure: Lower Endoscopy [Colonoscopy]    Date: 10/14/2024   Location: [facility]; [address], [place] [address]    Surgeon: Marshall   Sedation Level Scheduled  moderate ,  Reason for Sedation Level per order   Instructions updated and sent: y     Does patient need PAC or Pre -Op Rescheduled? : no       Did you cancel or rescheduled an EUS procedure? No.

## 2024-09-30 DIAGNOSIS — L65.9 ALOPECIA: ICD-10-CM

## 2024-10-01 ENCOUNTER — TELEPHONE (OUTPATIENT)
Dept: DERMATOLOGY | Facility: CLINIC | Age: 59
End: 2024-10-01
Payer: COMMERCIAL

## 2024-10-01 ENCOUNTER — MYC MEDICAL ADVICE (OUTPATIENT)
Dept: NURSING | Facility: CLINIC | Age: 59
End: 2024-10-01
Payer: COMMERCIAL

## 2024-10-01 RX ORDER — VITAMIN B COMPLEX
TABLET ORAL
Qty: 90 TABLET | Refills: 1 | Status: SHIPPED | OUTPATIENT
Start: 2024-10-01

## 2024-10-01 NOTE — TELEPHONE ENCOUNTER
Refill request for Fluocinolone 0.01% body oil from [facility] pharmacy #[id number] on [address] in [place].  [facility] Rx: [id number]  Tel: [phone]  Fax: [phone]    Ebenezer Warner on 10/1/2024 at 9:15 AM

## 2024-10-01 NOTE — TELEPHONE ENCOUNTER
"Medication Requested:  fluocinolone acetonide (DERMA SMOOTHE/FS BODY) 0.01 % external oil 118.28 mL 4 9/13/2023 -- No   Sig - Route: Apply topically 2 times daily - Topical     ----------------------  Last Office Visit : 3/6/2024  [facility]    Future Office visit:  0  ----------------------  Per last visit note:  \"Follow-up: 3 month(s) in-person \"    Refill decision: Refill pended and routed to the provider for review/determination due to the following criteria not met:     Medication unable to be refilled by RN due to criteria not met as indicated.     - Pt not seen within past 6 months, no future appointment  - Scheduling has been notified to contact the pt for appointment.    Pass/Fail Protocol Criteria:  fluocinolone acetonide (Dermasmoothe, Synalar)  Derm-Allergy Process #2    Refill Process #2     -Refill qty to 6 months from last clinic visit   Refuse if over 6 months.           "

## 2024-10-02 RX ORDER — BISACODYL 5 MG/1
TABLET, DELAYED RELEASE ORAL
Qty: 4 TABLET | Refills: 0 | Status: SHIPPED | OUTPATIENT
Start: 2024-10-02

## 2024-10-02 NOTE — TELEPHONE ENCOUNTER
Attempted to contact patient in order to complete pre assessment questions.     No answer. Left message to return call to [phone] option 2.    Callback communication sent via [employer].    Corinne Kliber, RN

## 2024-10-02 NOTE — TELEPHONE ENCOUNTER
Rescheduled Colonoscopy  Due to patient requested another day/time.    Pre visit planning completed.      Procedure details:    Patient scheduled for Colonoscopy on 10/14/24.     Arrival time: 1245. Procedure time 1330    Facility location: [facility] [place]; [address], [place] [address]. Check in location: 2nd Floor at Surgery desk.    Sedation type: Conscious sedation     Pre op exam needed? No.    Indication for procedure: screening      Chart review:     Electronic implanted devices? No    Recent diagnosis of diverticulitis within the last 6 weeks? No      Medication review:    Diabetic? Yes. Diabetic injectables: Exenatide (Byetta, Bydureon).  Weekly dosing of medication.  HOLD 7 days before procedure.  Follow up with managing provider.     Anticoagulants? Yes Clopidogrel (Plavix): Recommended HOLD 5 days before procedure.  Consult with your managing provider.    Weight loss medication/injectable? No. Patient is on GLP-1 medication but for DM (see above).    Other medication HOLDING recommendations:  N/A      Prep for procedure:     Bowel prep recommendation: Extended Golytely. Bowel prep prescription sent to [id number] - [place], [place] - [address]   Due to: GLP-1 agonist medication noted in chart.     Prep instructions sent via [employer]  **Low volume extended prep sent prior, however, due to policy the patient should complete Extended prep.  Writer will send Extended prep instructions and prep to patient's pharmacy.         Corinne Kliber, RN  Endoscopy Procedure Pre Assessment   [phone] option 2

## 2024-10-03 NOTE — TELEPHONE ENCOUNTER
Pre assessment completed for upcoming procedure.   (Please see previous telephone encounter notes for complete details)    Patient  returned call.       Procedure details:    Arrival time and facility location reviewed.    Pre op exam needed? No.    Designated  policy reviewed. Instructed to have someone stay 6  hours post procedure.       Medication review:    Medications reviewed. Please see supporting documentation below. Holding recommendations discussed (if applicable).       Prep for procedure:     Procedure prep instructions reviewed.        Any additional information needed:  Patient reports she does not take Plavix      Patient  verbalized understanding and had no questions or concerns at this time.      Sherlyn Hurst RN  Endoscopy Procedure Pre Assessment   [phone] option 2

## 2024-10-14 ENCOUNTER — HOSPITAL ENCOUNTER (OUTPATIENT)
Facility: AMBULATORY SURGERY CENTER | Age: 59
Discharge: HOME | End: 2024-10-14
Attending: INTERNAL MEDICINE | Admitting: INTERNAL MEDICINE
Payer: COMMERCIAL

## 2024-10-14 ENCOUNTER — MYC MEDICAL ADVICE (OUTPATIENT)
Dept: GASTROENTEROLOGY | Facility: CLINIC | Age: 59
End: 2024-10-14
Payer: COMMERCIAL

## 2024-10-14 VITALS
DIASTOLIC BLOOD PRESSURE: 81 MMHG | RESPIRATION RATE: 18 BRPM | OXYGEN SATURATION: 97 % | TEMPERATURE: 98.6 F | SYSTOLIC BLOOD PRESSURE: 117 MMHG | HEART RATE: 69 BPM

## 2024-10-14 DIAGNOSIS — Z86.0100 HISTORY OF COLONIC POLYPS: Primary | ICD-10-CM

## 2024-10-14 LAB
COLONOSCOPY: NORMAL
GLUCOSE BLDC GLUCOMTR-MCNC: 111 MG/DL (ref 70–99)

## 2024-10-14 PROCEDURE — 45378 DIAGNOSTIC COLONOSCOPY: CPT

## 2024-10-14 PROCEDURE — G8918 PT W/O PREOP ORDER IV AB PRO: HCPCS

## 2024-10-14 PROCEDURE — G8907 PT DOC NO EVENTS ON DISCHARG: HCPCS

## 2024-10-14 RX ORDER — ONDANSETRON 2 MG/ML
4 INJECTION INTRAMUSCULAR; INTRAVENOUS
Status: DISCONTINUED | OUTPATIENT
Start: 2024-10-14 | End: 2024-10-15 | Stop reason: HOSPADM

## 2024-10-14 RX ORDER — LIDOCAINE 40 MG/G
CREAM TOPICAL
Status: DISCONTINUED | OUTPATIENT
Start: 2024-10-14 | End: 2024-10-15 | Stop reason: HOSPADM

## 2024-10-14 RX ORDER — FENTANYL CITRATE 50 UG/ML
INJECTION, SOLUTION INTRAMUSCULAR; INTRAVENOUS PRN
Status: DISCONTINUED | OUTPATIENT
Start: 2024-10-14 | End: 2024-10-14 | Stop reason: HOSPADM

## 2024-10-14 NOTE — OR NURSING
Patient's sister is supposed to give patient a ride home today but staff and patient are unable to get a hold of her. Patient is scheduling a medical cab home and has a neighbor who will check on her throughout the evening.

## 2024-10-14 NOTE — H&P
[facility] Anesthesia Pre-op History and Physical    Rimma Sarmiento MRN# [id number]   Age: 59 year old YOB: 1965     Date of Exam 10/14/2024         Primary care provider: Aminata Trujillo         Chief Complaint and/or Reason for Procedure:     History of adenomatous polyps         Active problem list:     Patient Active Problem List    Diagnosis Date Noted    Sensorineural hearing loss, bilateral 03/24/2022     Priority: Medium    Combined forms of age-related cataract of both eyes 02/23/2022     Priority: Medium     Last Assessment & Plan:   Formatting of this note might be different from the original.  Mild bilateral cataracts. Not visually significant.  - Monitor      Dry eyes, bilateral 02/23/2022     Priority: Medium     Last Assessment & Plan:   Formatting of this note might be different from the original.  Bilateral dry eyes. Causing discomfort.  - Begin artificial tears QID both eyes  - Return if symptoms worsen or do not improve.      Myopia of both eyes with astigmatism and presbyopia 02/23/2022     Priority: Medium     Last Assessment & Plan:   Formatting of this note might be different from the original.  The results of the refraction were discussed and a new glasses Rx was dispensed.  - Adaptation period to new Rx discussed      CKD stage G2/A3, GFR 60-89 and albumin creatinine ratio >300 mg/g 01/18/2022     Priority: Medium    Fatigue, unspecified type 12/14/2020     Priority: Medium    History of electrophysiologic study for VT which was not found on 9/20/2019 10/12/2020     Priority: Medium    History of CVA (cerebrovascular accident) 10/12/2020     Priority: Medium    Chronic diastolic heart failure (H) 10/12/2020     Priority: Medium    Tobacco abuse counseling 10/12/2020     Priority: Medium    History of colonic polyps 09/24/2020     Priority: Medium     4/2/2019--hyperplastic x 1--repeat colonoscopy 5 years  6/29/2016--6 polyps, mix of tubular adenoma and hyperplastic  polyps.      Nontraumatic complete tear of right rotator cuff 09/08/2020     Priority: Medium    Morbid obesity with BMI of 40.0-44.9, adult (H) 09/18/2019     Priority: Medium    Ventricular tachycardia, non-sustained (H) 09/18/2019     Priority: Medium     History of NSVT run of 27 beats on ACT   Refusing the idc   EP study done 9/20/19, unable to induce VT despite aggressive stim per EP    TTE Normal LV size and systolic function.     Normal RV size and function.     Severe LA dilatation.     Trace to mild aortic regurgitation.     Negative bubble study.       Left Ventricular Ejection Fraction: 60 %    Coronary CT arteries done and ruled out subclinical CAD      Cerebrovascular accident (CVA) due to bilateral embolism of posterior cerebral arteries (H) 09/05/2019     Priority: Medium     Formatting of this note might be different from the original.  MRI IMPRESSION:  1.  No acute intracranial abnormality.  2.  Normal MRI of the internal auditory canals and labyrinthine structures.  3.  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  Overview:   IMO Update      Blurring of visual image 10/26/2013     Priority: Medium    SUSIE (obstructive sleep apnea) 10/26/2013     Priority: Medium     SLEEP STUDY INTERPRETATION  DIAGNOSTIC POLYSOMNOGRAPHY REPORT        Patient: CHARLINE DE LEON  YOB: 1965  Study Date: 2/24/2021  MRN: [id number]  Referring Provider: Melida Turner  Ordering Provider: Garfield Suero     Indications for  Polysomnography: The patient is a 55 year old Female. Her BMI is 41, Duke sleepiness scale 0 and neck circumference is - cm. Relevant medical history includes HTN, DM II, CVA, depression, anxiety. A diagnostic polysomnogram was performed to evaluate for sleep apnea/PLMS/RLS/RBD/S-S/CSA/hypoventilation/hypoxemia.     Polysomnogram Data: A full night polysomnogram recorded the standard physiologic parameters including EEG, EOG, EMG, ECG, nasal and oral airflow. Respiratory parameters of chest and abdominal movements were recorded with respiratory inductance plethysmography. Oxygen saturation was recorded by pulse oximetry. Hypopnea scoring rule used: 1B 4%.     Sleep Architecture: Quite fragmented, no supine REM observed  The total recording time of the polysomnogram was 520.4 minutes. The total sleep time was 368.5 minutes. Sleep latency was normal at 19.9 minutes without the use of a sleep aid. REM latency was 144.0 minutes. Arousal index was increased at 20.4 arousals per hour. Sleep efficiency was decreased at 70.8%. Wake after sleep onset was 127.5 minutes. The patient spent 11.7% of total sleep time in Stage N1, 66.1% in Stage N2, 9.1% in Stage N3, and 13.2% in REM. Time in REM supine was - minutes.     Respiration: Moderate SUSIE (AHI 16.6) without sleep-associated hypoxemia and strong positional component (supine AHI 49.7, lateral AHI 11.4).  Potential that severity under-reported given no supine REM observed.  Events ? The polysomnogram revealed a presence of 100 obstructive, - central, and - mixed apneas resulting in an apnea index of 16.3 events per hour. There were 2 obstructive hypopneas and - central hypopneas resulting in an obstructive hypopnea index of 0.3 and central hypopnea index of - events per hour. The combined apnea/hypopnea index was 16.6 events per hour (central apnea/hypopnea index was - events per hour). The REM AHI was 37.1 events per hour. The supine AHI was 49.7 events per hour. The  RERA index was - events per hour.  The RDI was 16.6 events per hour.  Snoring - was reported as moderate.  Respiratory rate and pattern - was notable for normal respiratory rate and pattern.  Sustained Sleep Associated Hypoventilation - Transcutaneous carbon dioxide monitoring was not used, however significant hypoventilation was not suggested by oximetry.  Sleep Associated Hypoxemia - (Greater than 5 minutes O2 sat at or below 88%) was not present. Baseline oxygen saturation was 94.1%. Lowest oxygen saturation was 85.3%. Time spent less than or equal to 88% was 0.9 minutes. Time spent less than or equal to 89% was 1.3 minutes.     Movement Activity: Nothing of note  Periodic Limb Activity - There were - PLMs during the entire study. The PLM index was - movements per hour. The PLM Arousal Index was - per hour.  REM EMG Activity - Excessive transient/sustained muscle activity was not present.  Nocturnal Behavior - Abnormal sleep related behaviors were not noted during/arising out of NREM / REM sleep.   Bruxism - None apparent.     Cardiac Summary: Appears NSR.  The average pulse rate was 56.4 bpm. The minimum pulse rate was 46.9 bpm while the maximum pulse rate was 81.1 bpm.                Assessment:   Moderate SUSIE (AHI 16.6) without sleep-associated hypoxemia and strong positional component (supine AHI 49.7, lateral AHI 11.4).   Potential that severity under-reported given no supine REM observed.     Recommendations:  Consider repeat polysomnography with full night titration of positive airway pressure therapy for the control of sleep disordered breathing.  Based on the presence of moderate obstructive sleep apnea and excessive daytime sleepiness, treatment could be empirically initiated with Auto?titrating PAP therapy with a range of 5 to 15 cmH2O. Recommend clinical follow up with sleep management team.  Patient may be a candidate for dental appliance through referral to Sleep Dentistry for the treatment of  obstructive sleep apnea and/or socially disruptive snoring.  Advice regarding the risks of drowsy driving.  Suggest optimizing sleep schedule and avoiding sleep deprivation.  Weight management (if BMI > 30).     Diagnostic Codes:   Obstructive Sleep Apnea G47.33     _____________________________________   Electronically Signed By: Garfield Suero MD (2/25/2021)           Other acute postprocedural pain 10/26/2013     Priority: Medium    Vitamin D deficiency 01/04/2013     Priority: Medium     IMO Update      Mixed hyperlipidemia 03/10/2010     Priority: Medium    Essential hypertension 03/10/2010     Priority: Medium    Alopecia 07/14/2009     Priority: Medium     Scarring lichen planopilaris bx proven  Scarring lichen planopilaris bx proven      Microalbuminuria 11/27/2008     Priority: Medium    Type 2 diabetes mellitus with hyperglycemia, without long-term current use of insulin (H) 10/23/2008     Priority: Medium     Diabetes dx'd:       Last A1c: HGA1C      7.0   1/4/2013      Last Chol: CHOL      199   10/16/2012 TRIG      193   10/16/2012 HDL       39   10/16/2012 LDL      171   6/9/2011      Microalbumin:  ALBCR     2832   2/8/2012        GFR      >60   10/16/2012   CREAT     0.93   10/16/2012     Smoker:  YES.  Aspirin:     Yes      Last eye exam:  Diabetes dx'd:       Last A1c: HGA1C      7.0   1/4/2013      Last Chol: CHOL      199   10/16/2012 TRIG      193   10/16/2012 HDL       39   10/16/2012 LDL      171   6/9/2011      Microalbumin:  ALBCR     2832   2/8/2012        GFR      >60   10/16/2012   CREAT     0.93   10/16/2012     Smoker:  YES.  Aspirin:     Yes      Last eye exam:      Tobacco use disorder 10/03/2008     Priority: Medium    Undersocialized conduct disorder, aggressive type 05/10/2006     Priority: Medium     IMO Update      Pain of lower extremity 01/13/2006     Priority: Medium    Gastroesophageal reflux disease 10/15/2004     Priority: Medium    Recurrent major depressive episodes  (H) 01/27/2004     Priority: Medium     Enrolled in [place] 3/35/2010 - Inactive in [place] 7/6/2010 due to no pt contact   5/1/2013: [facility] chart review completed.  Last PHQ-9 was 20 on 4/20/13.  Consider [facility] if 9 or above/angelique; letter mailed  Enrolled in [place] 3/35/2010 - Inactive in [place] 7/6/2010 due to no pt contact   5/1/2013: [facility] chart review completed.  Last PHQ-9 was 20 on 4/20/13.  Consider [facility] if 9 or above/angelique; letter mailed              Medications (include herbals and vitamins):   Any Plavix use in the last 7 days? No     Current Outpatient Medications   Medication Sig Dispense Refill    aspirin 81 MG EC tablet Take 1 tablet (81 mg) by mouth daily 90 tablet 3    atorvastatin (LIPITOR) 80 MG tablet Take 1 tablet (80 mg) by mouth daily 90 tablet 3    carvedilol (COREG) 25 MG tablet Take 0.5 tablets (12.5 mg) by mouth daily 180 tablet 3    clopidogrel (PLAVIX) 75 MG tablet Take 1 tablet (75 mg) by mouth daily 90 tablet 3    escitalopram (LEXAPRO) 10 MG tablet TAKE 1 TABLET DAILY BY MOUTH 90 tablet 3    famotidine (PEPCID) 20 MG tablet Take 1 tablet (20 mg) by mouth 2 times daily as needed (GERD) 90 tablet 1    hydrOXYzine HCl (ATARAX) 25 MG tablet TAKE 1 TABLET(25 MG) BY MOUTH EVERY NIGHT AS NEEDED FOR ANXIETY 30 tablet 3    losartan (COZAAR) 25 MG tablet TAKE 1 TABLET BY MOUTH EVERY DAY 90 tablet 1    melatonin 3 MG tablet Take 1 tablet (3 mg) by mouth nightly as needed for sleep 90 tablet 0    traZODone (DESYREL) 50 MG tablet Take  mg by mouth      vitamin D3 25 mcg (1000 units) tablet TAKE 4 TABS BY MOUTH DAILY 90 tablet 1    bisacodyl (DULCOLAX) 5 MG EC tablet Two days prior to exam take two (2) tablets at 4pm. One day prior to exam take two (2) tablets at 4pm 4 tablet 0    blood glucose (NO BRAND SPECIFIED) lancets standard Use to test blood sugar 3 times daily or as directed. 100 each 0    blood glucose (NO BRAND SPECIFIED) test strip Use to test blood sugar 3 times daily or  as directed. To accompany: Blood Glucose Monitor Brands: per insurance. 300 strip 3    blood glucose (NO BRAND SPECIFIED) test strip Use to test blood sugar 3  times daily or as directed. 100 strip 0    blood glucose monitoring (NO BRAND SPECIFIED) meter device kit Use to test blood sugar  3 times times daily or as directed. 1 kit 0    blood glucose monitoring (SOFTCLIX) lancets USE TO TEST BLOOD SUGAR 3 TIMES DAILY OR AS DIRECTED.      Blood Glucose Monitoring Suppl (FIFTY50 GLUCOSE METER 2.0) w/Device KIT Check blood sugar  twice a day 1 kit 0    carboxymethylcellulose PF (CARBOXYMETHYLCELLULOSE SODIUM) 0.5 % ophthalmic solution Place 1 drop into both eyes 4 times daily as needed for dry eyes 70 each 11    diclofenac (VOLTAREN) 1 % topical gel Apply 2 g to painful shoulder four times a day as needed      exenatide ER (BYDUREON BCISE) 2 MG/0.85ML auto-injector Inject 2 mg Subcutaneous every 7 days 6 mL 1    fluocinolone acetonide (DERMA SMOOTHE/FS BODY) 0.01 % external oil Apply topically 2 times daily 118.28 mL 4    Fluocinolone Acetonide Scalp (DERMA-SMOOTHE/FS SCALP) 0.01 % OIL oil Apply once daily for 6 weeks to scalp, then drop down to using 3 times weekly 60 mL 3    nicotine (NICORETTE) 2 MG gum Place 1 each (2 mg) inside cheek every hour as needed for nicotine withdrawal symptoms 50 each 1    nicotine (NICOTROL) 10 MG inhaler       polyethylene glycol (GOLYTELY) 236 g suspension Two days before procedure at 5PM fill first container with water. Mix and drink an 8 oz glass every 15 minutes until HALF of the container is gone. Place the remainder in the refrigerator. One day before procedure at 5PM drink second half of bowel prep. Drink an 8 oz glass every 15 minutes until it is gone. Day of procedure 6 hours before arrival time fill the 2nd container with water. Mix and drink an 8 oz glass every 15 minutes until HALF of the container is gone. Discard the remaining solution. 8000 mL 0     Current  Facility-Administered Medications   Medication Dose Route Frequency Provider Last Rate Last Admin    lidocaine (LMX4) kit   Topical Q1H PRN Blake Ayon MD        lidocaine 1 % 0.1-1 mL  0.1-1 mL Other Q1H PRN Blake Ayon MD        ondansetron (ZOFRAN) injection 4 mg  4 mg Intravenous Once PRN Blake Ayon MD        sodium chloride (PF) 0.9% PF flush 3 mL  3 mL Intracatheter Q8H Blake Ayon MD        sodium chloride (PF) 0.9% PF flush 3 mL  3 mL Intracatheter q1 min prn Blake Ayon MD                 Allergies:      Allergies   Allergen Reactions    Penicillins Angioedema, Other (See Comments), Hives and Rash    Codeine Nausea and Vomiting and Other (See Comments)     sweats    Etodolac Nausea and Vomiting    Hydrocodone     Metformin Diarrhea     Severe diarrhea    Trazodone Other (See Comments)     Felt suicidal while on Trazodone    Venlafaxine Nausea and Vomiting    Lisinopril Cough     Allergy to Latex? No  Allergy to tape?   No  Intolerances:             Physical Exam:   All vitals have been reviewed  Patient Vitals for the past 8 hrs:   BP Temp Temp src Resp SpO2   10/14/24 1215 (!) 148/106 99.4  F (37.4  C) Temporal 16 98 %     No intake/output data recorded.  Lungs:   no increased work of breathing     Cardiovascular:   RRR             Lab / Radiology Results:            Anesthetic risk and/or ASA classification:   2    Aleshia Olivares DO

## 2024-10-17 ENCOUNTER — DOCUMENTATION ONLY (OUTPATIENT)
Dept: GASTROENTEROLOGY | Facility: CLINIC | Age: 59
End: 2024-10-17
Payer: COMMERCIAL

## 2024-10-17 NOTE — PROGRESS NOTES
"Pre Assessment RN Review    Focused Assessments      SUSIE Hx  Estimated body mass index is 35.7 kg/m  as calculated from the following:    Height as of 6/6/24: 1.626 m (5' 4\").    Weight as of 8/20/24: 94.3 kg (208 lb).     Patient has reported / documented history SUSIE and reports she does use a a device for sleep.     Device: CPAP    Severity Assessment    Sleep Study:   Diagnosis/Severity: Moderate    AHI: 16.6          Scheduling Status & Recommendations    Location Type: Hospital - Per exclusion criteria. (Or [facility])    I advised  that she also needs MAC due to moderate anxiety.    Lisa Pryor RN Colorectal Cancer   Division of Gastroenterology at [facility]      "

## 2024-10-17 NOTE — TELEPHONE ENCOUNTER
"Pt not scheduled. Pt needs hospital and [facility]. Message sent to Dr. Olivares to confirm pt can be seen by different provider.    Endoscopy Scheduling Screen    Have you had any respiratory illness or flu-like symptoms in the last 10 days?  No    What is your communication preference for Instructions and/or Bowel Prep?   MyChart    What insurance is in the chart?  Other:  [facility]    Ordering/Referring Provider: Aleshia Olivares,    (If ordering provider performs procedure, schedule with ordering provider unless otherwise instructed. )    BMI: Estimated body mass index is 35.7 kg/m  as calculated from the following:    Height as of 6/6/24: 1.626 m (5' 4\").    Weight as of 8/20/24: 94.3 kg (208 lb).     Sedation Ordered  moderate sedation.   If patient BMI > 50 do not schedule in [facility].    If patient BMI > 45 do not schedule at [facility].    Are you taking methadone or Suboxone?  NO, No RN review required.    Have you been diagnosed and are being treated for severe PTSD or severe anxiety?  NO, No RN review required.    Are you taking any prescription medications for pain 3 or more times per week?   NO, No RN review required.    Do you have a history of malignant hyperthermia?  No    (Females) Are you currently pregnant?   No     Have you been diagnosed or told you have pulmonary hypertension?   No    Do you have an LVAD?  No    Have you been told you have moderate to severe sleep apnea?  Yes. Do you use a CPAP? Yes. (RN Review required for scheduling unless scheduling in Hospital.)     Have you been told you have COPD, asthma, or any other lung disease?  No    Do you have any heart conditions?  No     Have you ever had or are you waiting for an organ transplant?  No. Continue scheduling, no site restrictions.    Have you had a stroke or transient ischemic attack (TIA aka \"mini stroke\" in the last 6 months?   No    Have you been diagnosed with or been told you have cirrhosis of the liver?   No.    Are you currently on " "dialysis?   No    Do you need assistance transferring?   No    BMI: Estimated body mass index is 35.7 kg/m  as calculated from the following:    Height as of 6/6/24: 1.626 m (5' 4\").    Weight as of 8/20/24: 94.3 kg (208 lb).     Is patients BMI > 40 and scheduling location UP?  No    Do you take an injectable or oral medication for weight loss or diabetes (excluding insulin)?  No    Do you take the medication Naltrexone?  No    Do you take blood thinners?  No       Prep   Are you currently on dialysis or do you have chronic kidney disease?  No    Do you have a diagnosis of diabetes?  Yes (Golytely Prep)    Do you have a diagnosis of cystic fibrosis (CF)?  No    On a regular basis do you go 3 -5 days between bowel movements?  No    BMI > 40?  No    Preferred Pharmacy:    [id number] - [place], [place] - [address] [place] [address]  Phone: [phone] Fax: [phone]      Final Scheduling Details     Procedure scheduled  Colonoscopy          Patient Reminders:   You will receive a call from a Nurse to review instructions and health history.  This assessment must be completed prior to your procedure.  Failure to complete the Nurse assessment may result in the procedure being cancelled.      On the day of your procedure, please designate an adult(s) who can drive you home stay with you for the next 24 hours. The medicines used in the exam will make you sleepy. You will not be able to drive.      You cannot take public transportation, ride share services, or non-medical taxi service without a responsible caregiver.  Medical transport services are allowed with the requirement that a responsible caregiver will receive you at your destination.  We require that drivers and caregivers are confirmed prior to your procedure.  "

## 2024-11-15 NOTE — TELEPHONE ENCOUNTER
Called pt to offer 11/21/2024. [facility] VM. Informed pt we will hold appt until end of day. Please note that the writer is unsure as to why MAC was decided. Please review with pt.

## 2024-11-26 ENCOUNTER — OFFICE VISIT (OUTPATIENT)
Dept: FAMILY MEDICINE | Facility: CLINIC | Age: 59
End: 2024-11-26
Payer: COMMERCIAL

## 2024-11-26 ENCOUNTER — HOSPITAL ENCOUNTER (OUTPATIENT)
Facility: CLINIC | Age: 59
End: 2024-11-26
Attending: INTERNAL MEDICINE | Admitting: INTERNAL MEDICINE
Payer: COMMERCIAL

## 2024-11-26 VITALS
HEIGHT: 64 IN | WEIGHT: 211.2 LBS | RESPIRATION RATE: 15 BRPM | OXYGEN SATURATION: 99 % | DIASTOLIC BLOOD PRESSURE: 70 MMHG | HEART RATE: 70 BPM | BODY MASS INDEX: 36.06 KG/M2 | TEMPERATURE: 98.8 F | SYSTOLIC BLOOD PRESSURE: 130 MMHG

## 2024-11-26 DIAGNOSIS — F17.200 SMOKER: ICD-10-CM

## 2024-11-26 DIAGNOSIS — E11.69 TYPE 2 DIABETES MELLITUS WITH OTHER SPECIFIED COMPLICATION, WITH LONG-TERM CURRENT USE OF INSULIN (H): ICD-10-CM

## 2024-11-26 DIAGNOSIS — I50.32 CHRONIC DIASTOLIC HEART FAILURE (H): ICD-10-CM

## 2024-11-26 DIAGNOSIS — E78.2 MIXED HYPERLIPIDEMIA: ICD-10-CM

## 2024-11-26 DIAGNOSIS — E11.65 TYPE 2 DIABETES MELLITUS WITH HYPERGLYCEMIA, WITHOUT LONG-TERM CURRENT USE OF INSULIN (H): ICD-10-CM

## 2024-11-26 DIAGNOSIS — Q07.8 ANOMALOUS OPTIC NERVE (H): ICD-10-CM

## 2024-11-26 DIAGNOSIS — Z79.4 TYPE 2 DIABETES MELLITUS WITH OTHER SPECIFIED COMPLICATION, WITH LONG-TERM CURRENT USE OF INSULIN (H): ICD-10-CM

## 2024-11-26 LAB
EST. AVERAGE GLUCOSE BLD GHB EST-MCNC: 131 MG/DL
HBA1C MFR BLD: 6.2 % (ref 0–5.6)

## 2024-11-26 PROCEDURE — 36415 COLL VENOUS BLD VENIPUNCTURE: CPT | Performed by: NURSE PRACTITIONER

## 2024-11-26 PROCEDURE — 80061 LIPID PANEL: CPT | Performed by: NURSE PRACTITIONER

## 2024-11-26 PROCEDURE — 99214 OFFICE O/P EST MOD 30 MIN: CPT | Performed by: NURSE PRACTITIONER

## 2024-11-26 PROCEDURE — G2211 COMPLEX E/M VISIT ADD ON: HCPCS | Performed by: NURSE PRACTITIONER

## 2024-11-26 PROCEDURE — 83036 HEMOGLOBIN GLYCOSYLATED A1C: CPT | Performed by: NURSE PRACTITIONER

## 2024-11-26 RX ORDER — POLYETHYLENE GLYCOL 3350 17 G
2 POWDER IN PACKET (EA) ORAL
Qty: 27 LOZENGE | Refills: 3 | Status: SHIPPED | OUTPATIENT
Start: 2024-11-26

## 2024-11-26 ASSESSMENT — ANXIETY QUESTIONNAIRES
IF YOU CHECKED OFF ANY PROBLEMS ON THIS QUESTIONNAIRE, HOW DIFFICULT HAVE THESE PROBLEMS MADE IT FOR YOU TO DO YOUR WORK, TAKE CARE OF THINGS AT HOME, OR GET ALONG WITH OTHER PEOPLE: VERY DIFFICULT
7. FEELING AFRAID AS IF SOMETHING AWFUL MIGHT HAPPEN: NOT AT ALL
1. FEELING NERVOUS, ANXIOUS, OR ON EDGE: NOT AT ALL
4. TROUBLE RELAXING: NOT AT ALL
GAD7 TOTAL SCORE: 12
6. BECOMING EASILY ANNOYED OR IRRITABLE: NEARLY EVERY DAY
2. NOT BEING ABLE TO STOP OR CONTROL WORRYING: NEARLY EVERY DAY
3. WORRYING TOO MUCH ABOUT DIFFERENT THINGS: NEARLY EVERY DAY
GAD7 TOTAL SCORE: 12
5. BEING SO RESTLESS THAT IT IS HARD TO SIT STILL: NEARLY EVERY DAY
8. IF YOU CHECKED OFF ANY PROBLEMS, HOW DIFFICULT HAVE THESE MADE IT FOR YOU TO DO YOUR WORK, TAKE CARE OF THINGS AT HOME, OR GET ALONG WITH OTHER PEOPLE?: VERY DIFFICULT

## 2024-11-26 NOTE — PROGRESS NOTES
"  {PROVIDER CHARTING PREFERENCE:275627}    Manuel Shanks is a 59 year old, presenting for the following health issues:  Letter Request (Emotional support letter for cat ), Smoking Cessation (Would like to switch to lozenges, as she is unable to chew the gum.), and Recheck Medication (Questions about a blood thinner she reports is on her med list but she does not take.)      11/26/2024     3:00 PM   Additional Questions   Roomed by LY Magallanes BSN         11/26/2024   Forms   Any forms needing to be completed Yes        Smoking: cut down on smoking- 2 packs last 2 months.  Wants to switch to lozanges instead of nicotine gum    Needs emotional support letter for her cat. She benefits emotionally and mentally from cat .       History of Present Illness       Reason for visit:  Doctors letter   She is taking medications regularly.       {MA/LPN/RN Pre-Provider Visit Orders- hCG/UA/Strep (Optional):621909}  {SUPERLIST (Optional):261932}  {additonal problems for provider to add (Optional):035172}    {ROS Picklists (Optional):982831}      Objective    /70 (BP Location: Left arm, Patient Position: Sitting, Cuff Size: Adult Regular)   Pulse 70   Temp 98.8  F (37.1  C) (Tympanic)   Resp 15   Ht 1.626 m (5' 4\")   Wt 95.8 kg (211 lb 3.2 oz)   LMP  (LMP Unknown)   SpO2 99%   BMI 36.25 kg/m    Body mass index is 36.25 kg/m .  Physical Exam   {Exam List (Optional):857477}    {Diagnostic Test Results (Optional):261253}        Signed Electronically by: JANY Riggs CNP  {Email feedback regarding this note to [email or web address]   :766014}  " "Height as of this encounter: 1.626 m (5' 4\").    Weight as of this encounter: 95.8 kg (211 lb 3.2 oz).             Manuel Shanks is a 59 year old, presenting for the following health issues:  Letter Request (Emotional support letter for cat ), Smoking Cessation (Would like to switch to lozenges, as she is unable to chew the gum.), and Recheck Medication (Questions about a blood thinner she reports is on her med list but she does not take.)        11/26/2024     3:00 PM   Additional Questions   Roomed by LY Magallanes BSN         11/26/2024   Forms   Any forms needing to be completed Yes      Smoking: cut down on smoking- 2 packs are lasting 2 months.  Wants to switch to lozanges instead of nicotine gum    Needs emotional support letter for her cat. She benefits emotionally and mentally from cat .     Not taking Plavix, has not taken for many years- Reports of stroke was 10 years ago. Taking daily aspirin. On statin.    History of Present Illness       Reason for visit:  Doctors letter   She is taking medications regularly.                     Objective    /70 (BP Location: Left arm, Patient Position: Sitting, Cuff Size: Adult Regular)   Pulse 70   Temp 98.8  F (37.1  C) (Tympanic)   Resp 15   Ht 1.626 m (5' 4\")   Wt 95.8 kg (211 lb 3.2 oz)   LMP  (LMP Unknown)   SpO2 99%   BMI 36.25 kg/m    Body mass index is 36.25 kg/m .  Physical Exam               Signed Electronically by: JANY Riggs CNP    "

## 2024-11-26 NOTE — LETTER
November 26, 2024      Rimma Sarmiento  [address]  [place] [address]        To Whom It May Concern:    Rimma Sarmiento  was seen on 11/26/24.  Please allow her to house her emotional support animal (cat). She emotionally and mentally benefits from her cat .  Let me know if you have any questions.        Sincerely,        JANY Riggs CNP

## 2024-11-27 LAB
CHOLEST SERPL-MCNC: 263 MG/DL
FASTING STATUS PATIENT QL REPORTED: NO
HDLC SERPL-MCNC: 61 MG/DL
LDLC SERPL CALC-MCNC: 166 MG/DL
NONHDLC SERPL-MCNC: 202 MG/DL
TRIGL SERPL-MCNC: 178 MG/DL

## 2024-11-29 RX ORDER — EZETIMIBE 10 MG/1
10 TABLET ORAL DAILY
Qty: 90 TABLET | Refills: 3 | Status: SHIPPED | OUTPATIENT
Start: 2024-11-29

## 2024-12-02 ENCOUNTER — TELEPHONE (OUTPATIENT)
Dept: NURSING | Facility: CLINIC | Age: 59
End: 2024-12-02
Payer: COMMERCIAL

## 2024-12-02 ENCOUNTER — TELEPHONE (OUTPATIENT)
Dept: GASTROENTEROLOGY | Facility: CLINIC | Age: 59
End: 2024-12-02
Payer: COMMERCIAL

## 2024-12-02 NOTE — TELEPHONE ENCOUNTER
Caller: No call made    Reason for Reschedule/Cancellation   (please be detailed, any staff messages or encounters to note?): per PH RN pt did not get prep in time to complete it. Pt cannot prep. Will need to reschedule. Pt does not wish to reschedule at this time      Prior to reschedule please review:  Ordering Provider: Aleshia Olivares DO  Sedation Determined: MAC  Does patient have any ASC Exclusions, please identify?: No      Notes on Cancelled Procedure:  Procedure: Lower Endoscopy [Colonoscopy]   Date: 12/03/2024  Location: [facility]; [address] [place] [address] , [place] [address]  Surgeon: LONG      Rescheduled: No,         Did you cancel or rescheduled an EUS procedure? No.

## 2024-12-05 ENCOUNTER — VIRTUAL VISIT (OUTPATIENT)
Dept: PULMONOLOGY | Facility: OTHER | Age: 59
End: 2024-12-05
Attending: FAMILY MEDICINE
Payer: COMMERCIAL

## 2024-12-05 VITALS — BODY MASS INDEX: 36.83 KG/M2 | HEIGHT: 64 IN

## 2024-12-05 DIAGNOSIS — I10 ESSENTIAL HYPERTENSION: ICD-10-CM

## 2024-12-05 DIAGNOSIS — G47.33 OSA (OBSTRUCTIVE SLEEP APNEA): Primary | ICD-10-CM

## 2024-12-05 DIAGNOSIS — E11.65 TYPE 2 DIABETES MELLITUS WITH HYPERGLYCEMIA, WITHOUT LONG-TERM CURRENT USE OF INSULIN (H): ICD-10-CM

## 2024-12-05 DIAGNOSIS — I63.433 CEREBROVASCULAR ACCIDENT (CVA) DUE TO BILATERAL EMBOLISM OF POSTERIOR CEREBRAL ARTERIES (H): ICD-10-CM

## 2024-12-05 ASSESSMENT — PAIN SCALES - GENERAL: PAINLEVEL_OUTOF10: NO PAIN (0)

## 2024-12-05 NOTE — PATIENT INSTRUCTIONS
Hello,    It was a pleasure to meet you today.  Here is a summary of our plan:    I entered the prescription for the new CPAP machine.  You should hear from one of our team regarding the CPAP in the next few weeks.  If not, please contact me.      Garfield Suero MD    Sleep Medicine  [place], [place]  Main Office: [phone]  [facility] [address], [address]  Schedule visits: [phone]  Main Office: [phone]  Fax: [phone]

## 2024-12-05 NOTE — PROGRESS NOTES
"Rimma Sarmiento is a 59 year old female who is being evaluated via a billable video visit.       The patient has been notified of following:      \"This video visit will be conducted via a call between you and your physician/provider. We have found that certain health care needs can be provided without the need for an in-person physical exam.  This service lets us provide the care you need with a video conversation.  If a prescription is necessary we can send it directly to your pharmacy.  If lab work is needed we can place an order for that and you can then stop by our lab to have the test done at a later time.     Video visits are billed at different rates depending on your insurance coverage.  Please reach out to your insurance provider with any questions.     If during the course of the call the physician/provider feels a video visit is not appropriate, you will not be charged for this service.\"     Patient has given verbal consent for Video visit? Yes  How would you like to obtain your AVS? Mail a copy  If you are dropped from the video visit, the video invite should be resent to: Text to cell phone: -  Will anyone else be joining your video visit? No  If patient encounters technical issues they should call [phone]      Video-Visit Details     Type of service:  Video Visit     Start Time:  4pm  End Time:  4:20pm    Originating Location (pt. Location): Home     Distant Location (provider location):  Off-site, [facility]       Platform used for Video Visit: Signaturit    Virtual visit for re-establishing care for SUSIE and need for replacement equipment.     Assessment:  -Moderate obstructive sleep apnea without sleep associated hypoxemia  -Significant cardiac comorbidities including hypertension, HFpEF, prior cerebral infarct.  - Current CPAP is non-functional     Plan:  -Previously, she felt that she slept better with improved quality of life with using CPAP.  Given moderate " obstructive sleep apnea with above cardiovascular co-morbidities, I feel that it is medically necessary to proceed with replacement CPAP.  - I have placed orders for replacement CPAP auto-titrate 5-10 cm H2O.  - She would like to receive her AVS via physical mail and prefers text messaging if possible for communication.    SUBJECTIVE:  Rimma Sarmiento is a 59 year old female.    Pertinent past medical history of cerebral infarction with bilateral embolism of posterior cerebral arteries, SUSIE, GERD, obesity, vitamin D deficiency, type 2 diabetes, hypertension, heart failure with preserved ejection fraction, chronic kidney disease stage G2/A3, major depressive disorder.    Prior sleep testin2021 - PSG with BMI 41.  Moderate SUSIE (AHI 16.6) without sleep-associated hypoxemia and strong positional component (supine AHI 49.7, lateral AHI 11.4). Potential that severity under-reported given no supine REM observed.     3/2/2021 - Virtual visit to review PSG results.  Given prior positive response and cardiovascular risk factors, restarted treatment with CPAP auto-titrate 5-15 cm H2O.     2021 -she feels that restarting the CPAP is made a significant improvement in her sleep.  She feels she is sleeping better and feels she has more energy during the daytime.  While she does not directly perceive excessive mask leak, she has noted that her mouth is felt very dry in the morning.  No known residual snoring or observed apnea.     CPAP download reviewed from 2021 through 2021 on auto titrate 5-15 cm H2O.  Used 21 out of 30 days.  Average daily usage 4 hours and 25 minutes, average usage on days used 6 hours and 18 minutes.  Use greater than or equal to 4 hours on 60% of nights.  Pressure median 5.7 cm H2O, 95th percentile 7.7 cm H2O.  AHI 12.5, with unknown apnea index of 11.3.  Leak median 72.1 L/min, 95th percentile 118 L/min.    Plan to adjust her CPAP to auto titrate 5-8 cm H2O, monitor PAP  download and if doing well follow-up in 1 year.    3/22/2022 - Visit with Dr. Peng with [facility] sleep clinic.  I did not have patient authorization to review records at this time.    Today - She presents today with desire to restart CPAP.  It is unclear what prompted follow-up at this time.  She has not been using CPAP for more than a 1 year due to the CPAP machine no longer functioning.  She reports that it will not turn on.    Most recent BMI 36.3.    There is no data on CPAP download since June 2023.    Past medical history:    Patient Active Problem List    Diagnosis Date Noted    Sensorineural hearing loss, bilateral 03/24/2022     Priority: Medium    Combined forms of age-related cataract of both eyes 02/23/2022     Priority: Medium     Last Assessment & Plan:   Formatting of this note might be different from the original.  Mild bilateral cataracts. Not visually significant.  - Monitor      Dry eyes, bilateral 02/23/2022     Priority: Medium     Last Assessment & Plan:   Formatting of this note might be different from the original.  Bilateral dry eyes. Causing discomfort.  - Begin artificial tears QID both eyes  - Return if symptoms worsen or do not improve.      Myopia of both eyes with astigmatism and presbyopia 02/23/2022     Priority: Medium     Last Assessment & Plan:   Formatting of this note might be different from the original.  The results of the refraction were discussed and a new glasses Rx was dispensed.  - Adaptation period to new Rx discussed      CKD stage G2/A3, GFR 60-89 and albumin creatinine ratio >300 mg/g 01/18/2022     Priority: Medium    Fatigue, unspecified type 12/14/2020     Priority: Medium    History of electrophysiologic study for VT which was not found on 9/20/2019 10/12/2020     Priority: Medium    History of CVA (cerebrovascular accident) 10/12/2020     Priority: Medium    Chronic diastolic heart failure (H) 10/12/2020     Priority: Medium    Tobacco abuse counseling 10/12/2020      Priority: Medium    History of colonic polyps 09/24/2020     Priority: Medium     4/2/2019--hyperplastic x 1--repeat colonoscopy 5 years  6/29/2016--6 polyps, mix of tubular adenoma and hyperplastic polyps.      Nontraumatic complete tear of right rotator cuff 09/08/2020     Priority: Medium    Morbid obesity with BMI of 40.0-44.9, adult (H) 09/18/2019     Priority: Medium    Ventricular tachycardia, non-sustained (H) 09/18/2019     Priority: Medium     History of NSVT run of 27 beats on ACT   Refusing the idc   EP study done 9/20/19, unable to induce VT despite aggressive stim per EP    TTE Normal LV size and systolic function.     Normal RV size and function.     Severe LA dilatation.     Trace to mild aortic regurgitation.     Negative bubble study.       Left Ventricular Ejection Fraction: 60 %    Coronary CT arteries done and ruled out subclinical CAD      Cerebrovascular accident (CVA) due to bilateral embolism of posterior cerebral arteries (H) 09/05/2019     Priority: Medium     Formatting of this note might be different from the original.  MRI IMPRESSION:  1.  No acute intracranial abnormality.  2.  Normal MRI of the internal auditory canals and labyrinthine structures.  3.  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  Overview:   IMO Update      Blurring of visual image 10/26/2013     Priority: Medium    SUSIE (obstructive sleep apnea) 10/26/2013     Priority: Medium     SLEEP STUDY INTERPRETATION  DIAGNOSTIC  POLYSOMNOGRAPHY REPORT        Patient: CHARLINE DE LEON  YOB: 1965  Study Date: 2/24/2021  MRN: [id number]  Referring Provider: Melida Turner  Ordering Provider: Garfield Suero     Indications for Polysomnography: The patient is a 55 year old Female. Her BMI is 41, [place] sleepiness scale 0 and neck circumference is - cm. Relevant medical history includes HTN, DM II, CVA, depression, anxiety. A diagnostic polysomnogram was performed to evaluate for sleep apnea/PLMS/RLS/RBD/S-S/CSA/hypoventilation/hypoxemia.     Polysomnogram Data: A full night polysomnogram recorded the standard physiologic parameters including EEG, EOG, EMG, ECG, nasal and oral airflow. Respiratory parameters of chest and abdominal movements were recorded with respiratory inductance plethysmography. Oxygen saturation was recorded by pulse oximetry. Hypopnea scoring rule used: 1B 4%.     Sleep Architecture: Quite fragmented, no supine REM observed  The total recording time of the polysomnogram was 520.4 minutes. The total sleep time was 368.5 minutes. Sleep latency was normal at 19.9 minutes without the use of a sleep aid. REM latency was 144.0 minutes. Arousal index was increased at 20.4 arousals per hour. Sleep efficiency was decreased at 70.8%. Wake after sleep onset was 127.5 minutes. The patient spent 11.7% of total sleep time in Stage N1, 66.1% in Stage N2, 9.1% in Stage N3, and 13.2% in REM. Time in REM supine was - minutes.     Respiration: Moderate SUSIE (AHI 16.6) without sleep-associated hypoxemia and strong positional component (supine AHI 49.7, lateral AHI 11.4).  Potential that severity under-reported given no supine REM observed.  Events ? The polysomnogram revealed a presence of 100 obstructive, - central, and - mixed apneas resulting in an apnea index of 16.3 events per hour. There were 2 obstructive hypopneas and - central hypopneas resulting in an obstructive hypopnea index of 0.3 and central hypopnea index of -  events per hour. The combined apnea/hypopnea index was 16.6 events per hour (central apnea/hypopnea index was - events per hour). The REM AHI was 37.1 events per hour. The supine AHI was 49.7 events per hour. The RERA index was - events per hour.  The RDI was 16.6 events per hour.  Snoring - was reported as moderate.  Respiratory rate and pattern - was notable for normal respiratory rate and pattern.  Sustained Sleep Associated Hypoventilation - Transcutaneous carbon dioxide monitoring was not used, however significant hypoventilation was not suggested by oximetry.  Sleep Associated Hypoxemia - (Greater than 5 minutes O2 sat at or below 88%) was not present. Baseline oxygen saturation was 94.1%. Lowest oxygen saturation was 85.3%. Time spent less than or equal to 88% was 0.9 minutes. Time spent less than or equal to 89% was 1.3 minutes.     Movement Activity: Nothing of note  Periodic Limb Activity - There were - PLMs during the entire study. The PLM index was - movements per hour. The PLM Arousal Index was - per hour.  REM EMG Activity - Excessive transient/sustained muscle activity was not present.  Nocturnal Behavior - Abnormal sleep related behaviors were not noted during/arising out of NREM / REM sleep.   Bruxism - None apparent.     Cardiac Summary: Appears NSR.  The average pulse rate was 56.4 bpm. The minimum pulse rate was 46.9 bpm while the maximum pulse rate was 81.1 bpm.                Assessment:   Moderate SUSIE (AHI 16.6) without sleep-associated hypoxemia and strong positional component (supine AHI 49.7, lateral AHI 11.4).   Potential that severity under-reported given no supine REM observed.     Recommendations:  Consider repeat polysomnography with full night titration of positive airway pressure therapy for the control of sleep disordered breathing.  Based on the presence of moderate obstructive sleep apnea and excessive daytime sleepiness, treatment could be empirically initiated with  Auto?titrating PAP therapy with a range of 5 to 15 cmH2O. Recommend clinical follow up with sleep management team.  Patient may be a candidate for dental appliance through referral to Sleep Dentistry for the treatment of obstructive sleep apnea and/or socially disruptive snoring.  Advice regarding the risks of drowsy driving.  Suggest optimizing sleep schedule and avoiding sleep deprivation.  Weight management (if BMI > 30).     Diagnostic Codes:   Obstructive Sleep Apnea G47.33     _____________________________________   Electronically Signed By: Garfield Suero MD (2/25/2021)           Other acute postprocedural pain 10/26/2013     Priority: Medium    Vitamin D deficiency 01/04/2013     Priority: Medium     IMO Update      Mixed hyperlipidemia 03/10/2010     Priority: Medium    Essential hypertension 03/10/2010     Priority: Medium    Alopecia 07/14/2009     Priority: Medium     Scarring lichen planopilaris bx proven  Scarring lichen planopilaris bx proven      Microalbuminuria 11/27/2008     Priority: Medium    Type 2 diabetes mellitus with hyperglycemia, without long-term current use of insulin (H) 10/23/2008     Priority: Medium     Diabetes dx'd:       Last A1c: HGA1C      7.0   1/4/2013      Last Chol: CHOL      199   10/16/2012 TRIG      193   10/16/2012 HDL       39   10/16/2012 LDL      171   6/9/2011      Microalbumin:  ALBCR     2832   2/8/2012        GFR      >60   10/16/2012   CREAT     0.93   10/16/2012     Smoker:  YES.  Aspirin:     Yes      Last eye exam:  Diabetes dx'd:       Last A1c: HGA1C      7.0   1/4/2013      Last Chol: CHOL      199   10/16/2012 TRIG      193   10/16/2012 HDL       39   10/16/2012 LDL      171   6/9/2011      Microalbumin:  ALBCR     2832   2/8/2012        GFR      >60   10/16/2012   CREAT     0.93   10/16/2012     Smoker:  YES.  Aspirin:     Yes      Last eye exam:      Tobacco use disorder 10/03/2008     Priority: Medium    Undersocialized conduct disorder, aggressive  type 05/10/2006     Priority: Medium     IMO Update      Pain of lower extremity 01/13/2006     Priority: Medium    Gastroesophageal reflux disease 10/15/2004     Priority: Medium    Recurrent major depressive episodes (H) 01/27/2004     Priority: Medium     Enrolled in [employer] 3/35/2010 - Inactive in [place] 7/6/2010 due to no pt contact   5/1/2013: [facility] chart review completed.  Last PHQ-9 was 20 on 4/20/13.  Consider [facility] if 9 or above/angelique; letter mailed  Enrolled in [employer] 3/35/2010 - Inactive in [place] 7/6/2010 due to no pt contact   5/1/2013: [facility] chart review completed.  Last PHQ-9 was 20 on 4/20/13.  Consider [facility] if 9 or above/angelique; letter mailed         10 point ROS of systems including Constitutional, Eyes, Respiratory, Cardiovascular, Gastroenterology, Genitourinary, Integumentary, Muscularskeletal, Psychiatric were all negative except for pertinent positives noted in my HPI.    Current Outpatient Medications   Medication Sig Dispense Refill    aspirin 81 MG EC tablet Take 1 tablet (81 mg) by mouth daily 90 tablet 3    atorvastatin (LIPITOR) 80 MG tablet Take 1 tablet (80 mg) by mouth daily 90 tablet 3    bisacodyl (DULCOLAX) 5 MG EC tablet Two days prior to exam take two (2) tablets at 4pm. One day prior to exam take two (2) tablets at 4pm 4 tablet 0    blood glucose (NO BRAND SPECIFIED) lancets standard Use to test blood sugar 3 times daily or as directed. 100 each 0    blood glucose (NO BRAND SPECIFIED) test strip Use to test blood sugar 3 times daily or as directed. To accompany: Blood Glucose Monitor Brands: per insurance. 300 strip 3    blood glucose (NO BRAND SPECIFIED) test strip Use to test blood sugar 3  times daily or as directed. 100 strip 0    blood glucose monitoring (NO BRAND SPECIFIED) meter device kit Use to test blood sugar  3 times times daily or as directed. 1 kit 0    blood glucose monitoring (SOFTCLIX) lancets USE TO TEST BLOOD SUGAR 3 TIMES DAILY OR AS DIRECTED.       Blood Glucose Monitoring Suppl (FIFTY50 GLUCOSE METER 2.0) w/Device KIT Check blood sugar  twice a day 1 kit 0    carboxymethylcellulose PF (CARBOXYMETHYLCELLULOSE SODIUM) 0.5 % ophthalmic solution Place 1 drop into both eyes 4 times daily as needed for dry eyes 70 each 11    carvedilol (COREG) 25 MG tablet Take 0.5 tablets (12.5 mg) by mouth daily 180 tablet 3    diclofenac (VOLTAREN) 1 % topical gel Apply 2 g to painful shoulder four times a day as needed      escitalopram (LEXAPRO) 10 MG tablet TAKE 1 TABLET DAILY BY MOUTH 90 tablet 3    exenatide ER (BYDUREON BCISE) 2 MG/0.85ML auto-injector Inject 2 mg subcutaneously every 7 days. 6 mL 4    ezetimibe (ZETIA) 10 MG tablet Take 1 tablet (10 mg) by mouth daily. 90 tablet 3    famotidine (PEPCID) 20 MG tablet Take 1 tablet (20 mg) by mouth 2 times daily as needed (GERD) 90 tablet 1    fluocinolone acetonide (DERMA SMOOTHE/FS BODY) 0.01 % external oil Apply topically 2 times daily 118.28 mL 4    Fluocinolone Acetonide Scalp (DERMA-SMOOTHE/FS SCALP) 0.01 % OIL oil Apply once daily for 6 weeks to scalp, then drop down to using 3 times weekly 60 mL 3    hydrOXYzine HCl (ATARAX) 25 MG tablet TAKE 1 TABLET(25 MG) BY MOUTH EVERY NIGHT AS NEEDED FOR ANXIETY 30 tablet 3    melatonin 3 MG tablet Take 1 tablet (3 mg) by mouth nightly as needed for sleep 90 tablet 0    nicotine (COMMIT) 2 MG lozenge Place 1 lozenge (2 mg) inside cheek every hour as needed for nicotine withdrawal symptoms. 27 lozenge 3    nicotine (NICOTROL) 10 MG inhaler       polyethylene glycol (GOLYTELY) 236 g suspension Two days before procedure at 5PM fill first container with water. Mix and drink an 8 oz glass every 15 minutes until HALF of the container is gone. Place the remainder in the refrigerator. One day before procedure at 5PM drink second half of bowel prep. Drink an 8 oz glass every 15 minutes until it is gone. Day of procedure 6 hours before arrival time fill the 2nd container with water. Mix  and drink an 8 oz glass every 15 minutes until HALF of the container is gone. Discard the remaining solution. 8000 mL 0    traZODone (DESYREL) 50 MG tablet Take  mg by mouth      vitamin D3 25 mcg (1000 units) tablet TAKE 4 TABS BY MOUTH DAILY 90 tablet 1       OBJECTIVE:  LMP  (LMP Unknown)     Physical Exam     ---  This note was written with the assistance of the Dragon voice-dictation technology software. The final document, although reviewed, may contain errors. For corrections, please contact the office.    Total time spent preparing to see the patient, review of chart, obtaining history and physical examination, review of sleep testing, review of treatment options, education, discussion with patient and documenting in Epic / EMR was 25 minutes.  All time involved was spent on the day of service for the patient (the same day as the patient's appointment).    Garfield Suero MD    Sleep Medicine  [place], [place]  Main Office: [phone]  [facility] [address], [address]  Schedule visits: [phone]  Main Office: [phone]  Fax: [phone]

## 2024-12-05 NOTE — PROGRESS NOTES
"Virtual Visit Details    Type of service:  Video Visit   Video Start Time: {video visit start/end time for provider to select:671474}  Video End Time:{video visit start/end time for provider to select:585078}    Originating Location (pt. Location): {video visit patient location:529844::\"Home\"}  {PROVIDER LOCATION On-site should be selected for visits conducted from your clinic location or adjoining [facility] hospital, academic office, or other nearby [facility] building. Off-site should be selected for all other provider locations, including home:900055}  Distant Location (provider location):  {virtual location provider:884691}  Platform used for Video Visit: {Virtual Visit Platforms:871989::\"[employer]\"}                  CPAP Compliance Visit:       Name: Rimma Sarmiento MRN# [id number]   Age: 59 year old YOB: 1965     Date of Consultation: December 5, 2024  Primary care provider: Aminata Trujillo    Compliance:   7 % of days with >4 hours of use.   29days/30 with no use   Average use: 6hours 52minutes per day   95%ile leak 66.6 L/min   CPAP 95% pressure 9.7 cm   AHI 3.5 events/hour   THANIA 0.1  PB 0%     Impression:   Patient is {CPAP Compliance :610686}    "

## 2024-12-05 NOTE — NURSING NOTE
Current patient location: [address] [place] [address] [address]  [place] [address]    Is the patient currently in the state of [place]? YES    Visit mode:VIDEO    If the visit is dropped, the patient can be reconnected by:VIDEO VISIT: Text to cell phone:   Telephone Information:   Mobile [phone]       Will anyone else be joining the visit? NO  (If patient encounters technical issues they should call [phone])    Are changes needed to the allergy or medication list? No, Pt stated no changes to allergies, and Pt stated no med changes    Are refills needed on medications prescribed by this physician? NO    Rooming Documentation:  Questionnaire(s) completed    Reason for visit: RECHECK (Follow-up Compliance)    Sarah VILLA

## 2024-12-09 ENCOUNTER — OFFICE VISIT (OUTPATIENT)
Dept: CARDIOLOGY | Facility: CLINIC | Age: 59
End: 2024-12-09
Attending: NURSE PRACTITIONER
Payer: COMMERCIAL

## 2024-12-09 VITALS
HEIGHT: 64 IN | WEIGHT: 207.1 LBS | RESPIRATION RATE: 20 BRPM | SYSTOLIC BLOOD PRESSURE: 112 MMHG | BODY MASS INDEX: 35.36 KG/M2 | OXYGEN SATURATION: 92 % | HEART RATE: 67 BPM | DIASTOLIC BLOOD PRESSURE: 86 MMHG

## 2024-12-09 DIAGNOSIS — E11.65 TYPE 2 DIABETES MELLITUS WITH HYPERGLYCEMIA, WITHOUT LONG-TERM CURRENT USE OF INSULIN (H): ICD-10-CM

## 2024-12-09 DIAGNOSIS — I50.32 CHRONIC DIASTOLIC HEART FAILURE (H): ICD-10-CM

## 2024-12-09 DIAGNOSIS — R01.1 SYSTOLIC MURMUR: Primary | ICD-10-CM

## 2024-12-09 PROCEDURE — G2211 COMPLEX E/M VISIT ADD ON: HCPCS | Performed by: INTERNAL MEDICINE

## 2024-12-09 PROCEDURE — 99204 OFFICE O/P NEW MOD 45 MIN: CPT | Performed by: INTERNAL MEDICINE

## 2024-12-09 NOTE — PROGRESS NOTES
"  HEART CARE ENCOUNTER CONSULTATON NOTE      [facility]  [phone]      Assessment/Recommendations   Assessment:   New systolic murmur  Diabetes mellitus type 2, A1c 6.2  Cerebrovascular disease, posterior circulation CVA  Hyperlipidemia, uncontrolled  Hypertension  Obstructive sleep apnea  Obesity, BMI 36    Plan:    Complete echocardiogram to check heart valves.     Make sure to take atorvastatin and ezetimibe for cholesterol lowering.     Continue aspirin    No evidence of significant congestive heart failure based on symptomatology or examination.         History of Present Illness/Subjective    HPI: Rimma Sarmiento is a 59 year old female diabetes, obesity, hypertension, CVA who presents to cardiology clinic in consultation for possible congestive heart failure and abnormal heart sound.    According to the patient she denies any dyspnea on exertion, orthopnea or PND symptoms.  She denies any chest pain.  Review of lipids demonstrated significant rise in her LDL recently.  She states she was off her medications which is likely the cause.  Discussed the importance of maintaining adequate lipid control and she will resume her a atorvastatin and Zetia.  She continues to smoke but has decreased it down to 3 cigarettes/day.  She has a plan to discontinue smoking altogether.    Outlined that I did hear a systolic murmur.  Likely aortic sclerosis.  Will obtain echo to further evaluate    Echocardiogram Results: No recent       Physical Examination  Review of Systems   Vitals: /86 (BP Location: Right arm, Patient Position: Sitting, Cuff Size: Adult Large)   Pulse 67   Resp 20   Ht 1.613 m (5' 3.5\")   Wt 93.9 kg (207 lb 1.6 oz)   LMP  (LMP Unknown)   SpO2 92%   BMI 36.11 kg/m    BMI= Body mass index is 36.11 kg/m .  Wt Readings from Last 3 Encounters:   12/09/24 93.9 kg (207 lb 1.6 oz)   11/26/24 95.8 kg (211 lb 3.2 oz)   08/20/24 94.3 kg (208 lb)        Pleasant, obesity noted "   ENT/Mouth: membranes moist, no oral lesions or bleeding gums.      EYES:  no scleral icterus, normal conjunctivae       Chest/Lungs:   lungs are clear to auscultation, no rales or wheezing, no sternal scar, equal chest wall expansion    Cardiovascular:   Regular. Normal first and second heart sounds with 2 out of 6 early peaking systolic murmurs, rubs, or gallops; the carotid, radial and posterior tibial pulses are intact, Jugular venous pressure normal, no pitting edema bilaterally    Abdomen:  no tenderness; bowel sounds are present   Extremities: no cyanosis or clubbing   Skin: no xanthelasma, warm.    Neurologic: normal  bilateral, no tremors     Psychiatric: alert and oriented x3, calm        Please refer above for cardiac ROS details.        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 MRI IMPRESSION: 1.  No acute intracranial abnormality. 2.  Normal MRI of the internal auditory canals and labyrinthine structures. 3.  Old infarctions in the left occipital lobe and left frontal centrum [place]    CKD stage G2/A3, GFR 60-89 and albumin creatinine ratio >300 mg/g 01/18/2022    Combined forms of age-related cataract of both eyes 02/23/2022    Last Assessment & Plan:  Formatting of this note might be different from the original. Mild bilateral cataracts. Not visually significant. - Monitor    cva 2014    right sided hemiparesis, resolved    Depression     Dizzy spells     hospitalized at [facility] in the past    DM (diabetes mellitus) (H)     High cholesterol 09/17/2019    History of colonic polyps 09/24/2020 4/2/2019--hyperplastic x 1--repeat colonoscopy 5 years 6/29/2016--6 polyps, mix of tubular adenoma and hyperplastic  polyps.    Hypertension     Hypertensive urgency 10/26/2013    Mixed hyperlipidemia 03/10/2010    Morbid obesity (H) 2004    Sleep apnea     uses CPAP    Tobacco use disorder 10/03/2008    Type 2 diabetes mellitus (H) 10/23/2008    Type 2 diabetes mellitus with hyperglycemia, with long-term current use of insulin (H) 10/23/2008    Diabetes dx'd:      Last A1c: HGA1C      7.0   2013     Last Chol: CHOL      199   10/16/2012 TRIG      193   10/16/2012 HDL       39   10/16/2012 LDL      171   2011     Microalbumin:  ALBCR     2832   2012        GFR      >60   10/16/2012   CREAT     0.93   10/16/2012    Smoker:  YES.  Aspirin:     Yes     Last eye exam: Diabetes dx'd:      Last A1c: HGA1C      7.0   2013     [place]     Past Surgical History:   Procedure Laterality Date    CHOLECYSTECTOMY      COLONOSCOPY      2019--hyperplastic x 1--repeat colonoscopy 5 years    COLONOSCOPY N/A 10/14/2024    COLONOSCOPY WITH CO2 INSUFFLATION N/A 10/14/2024    Procedure: Colonoscopy with CO2 insufflation;  Surgeon: Aleshia Olivares DO;  Location: [facility] OR    HYSTERECTOMY TOTAL ABDOMINAL, BILATERAL SALPINGO-OOPHORECTOMY, COMBINED      SHOULDER SURGERY Bilateral      Family History   Problem Relation Age of Onset    Diabetes Mother     Hypertension Mother     Cerebrovascular Disease Mother     Cerebrovascular Disease Maternal Grandmother     Alcoholism Brother     Diabetes Brother     Alcoholism Brother     Glaucoma No family hx of     Macular Degeneration No family hx of         Social History     Socioeconomic History    Marital status:      Spouse name: Not on file    Number of children: Not on file    Years of education: Not on file    Highest education level: Not on file   Occupational History    Not on file   Tobacco Use    Smoking status: Every Day     Current packs/day: 0.00     Types: Cigarettes     Last attempt to quit: 2019     Years since quittin.3    Smokeless tobacco: Never    Tobacco  comments:     12/5/24-Smokes 3-4 cigarettes per day.      Has quit multiple times   Vaping Use    Vaping status: Never Used   Substance and Sexual Activity    Alcohol use: Not Currently    Drug use: Not Currently    Sexual activity: Not on file   Other Topics Concern    Not on file   Social History Narrative    3/2023        Lives by herself    On SSDI    Has 3 girls, 3 boys and 11 grands     Social Drivers of Health     Financial Resource Strain: Low Risk  (6/5/2024)    Financial Resource Strain     Within the past 12 months, have you or your family members you live with been unable to get utilities (heat, electricity) when it was really needed?: No   Food Insecurity: Low Risk  (6/5/2024)    Food Insecurity     Within the past 12 months, did you worry that your food would run out before you got money to buy more?: No     Within the past 12 months, did the food you bought just not last and you didn t have money to get more?: No   Transportation Needs: Unknown (6/5/2024)    Transportation Needs     Within the past 12 months, has lack of transportation kept you from medical appointments, getting your medicines, non-medical meetings or appointments, work, or from getting things that you need?: Patient declined   Physical Activity: Sufficiently Active (6/5/2024)    Exercise Vital Sign     Days of Exercise per Week: 3 days     Minutes of Exercise per Session: 50 min   Stress: Stress Concern Present (6/5/2024)    [employer] - Occupational Stress Questionnaire     Feeling of Stress : To some extent   Social Connections: Unknown (6/5/2024)    Social Connection and Isolation Panel [NHANES]     Frequency of Communication with Friends and Family: Not on file     Frequency of Social Gatherings with Friends and Family: Never     Attends [employer] Services: Not on file     Active Member of Clubs or Organizations: Not on file     Attends Club or Organization Meetings: Not on file     Marital Status:  Not on file   Interpersonal Safety: Low Risk  (10/14/2024)    Interpersonal Safety     Do you feel physically and emotionally safe where you currently live?: Yes     Within the past 12 months, have you been hit, slapped, kicked or otherwise physically hurt by someone?: No     Within the past 12 months, have you been humiliated or emotionally abused in other ways by your partner or ex-partner?: No   Housing Stability: Low Risk  (6/5/2024)    Housing Stability     Do you have housing? : Yes     Are you worried about losing your housing?: Patient declined           Medications  Allergies   Current Outpatient Medications   Medication Sig Dispense Refill    aspirin 81 MG EC tablet Take 1 tablet (81 mg) by mouth daily 90 tablet 3    atorvastatin (LIPITOR) 80 MG tablet Take 1 tablet (80 mg) by mouth daily 90 tablet 3    blood glucose (NO BRAND SPECIFIED) lancets standard Use to test blood sugar 3 times daily or as directed. 100 each 0    blood glucose (NO BRAND SPECIFIED) test strip Use to test blood sugar 3 times daily or as directed. To accompany: Blood Glucose Monitor Brands: per insurance. 300 strip 3    blood glucose (NO BRAND SPECIFIED) test strip Use to test blood sugar 3  times daily or as directed. 100 strip 0    blood glucose monitoring (NO BRAND SPECIFIED) meter device kit Use to test blood sugar  3 times times daily or as directed. 1 kit 0    blood glucose monitoring (SOFTCLIX) lancets USE TO TEST BLOOD SUGAR 3 TIMES DAILY OR AS DIRECTED.      Blood Glucose Monitoring Suppl (FIFTY50 GLUCOSE METER 2.0) w/Device KIT Check blood sugar  twice a day 1 kit 0    diclofenac (VOLTAREN) 1 % topical gel Apply 2 g to painful shoulder four times a day as needed      escitalopram (LEXAPRO) 10 MG tablet TAKE 1 TABLET DAILY BY MOUTH 90 tablet 3    exenatide ER (BYDUREON BCISE) 2 MG/0.85ML auto-injector Inject 2 mg subcutaneously every 7 days. 6 mL 4    famotidine (PEPCID) 20 MG tablet Take 1 tablet (20 mg) by mouth 2 times  daily as needed (GERD) 90 tablet 1    Fluocinolone Acetonide Scalp (DERMA-SMOOTHE/FS SCALP) 0.01 % OIL oil Apply once daily for 6 weeks to scalp, then drop down to using 3 times weekly 60 mL 3    hydrOXYzine HCl (ATARAX) 25 MG tablet TAKE 1 TABLET(25 MG) BY MOUTH EVERY NIGHT AS NEEDED FOR ANXIETY 30 tablet 3    nicotine (COMMIT) 2 MG lozenge Place 1 lozenge (2 mg) inside cheek every hour as needed for nicotine withdrawal symptoms. 27 lozenge 3    traZODone (DESYREL) 50 MG tablet Take  mg by mouth      vitamin D3 25 mcg (1000 units) tablet TAKE 4 TABS BY MOUTH DAILY 90 tablet 1    bisacodyl (DULCOLAX) 5 MG EC tablet Two days prior to exam take two (2) tablets at 4pm. One day prior to exam take two (2) tablets at 4pm (Patient not taking: Reported on 12/9/2024) 4 tablet 0    carvedilol (COREG) 25 MG tablet Take 0.5 tablets (12.5 mg) by mouth daily (Patient not taking: Reported on 12/9/2024) 180 tablet 3    ezetimibe (ZETIA) 10 MG tablet Take 1 tablet (10 mg) by mouth daily. (Patient not taking: Reported on 12/9/2024) 90 tablet 3    fluocinolone acetonide (DERMA SMOOTHE/FS BODY) 0.01 % external oil Apply topically 2 times daily (Patient not taking: Reported on 12/9/2024) 118.28 mL 4    melatonin 3 MG tablet Take 1 tablet (3 mg) by mouth nightly as needed for sleep (Patient not taking: Reported on 12/9/2024) 90 tablet 0       Allergies   Allergen Reactions    Penicillins Other (See Comments), Angioedema, Hives and Rash          Lab Results    Chemistry/lipid CBC Cardiac Enzymes/BNP/TSH/INR   Recent Labs   Lab Test 11/26/24  1613   CHOL 263*   HDL 61   *   TRIG 178*     Recent Labs   Lab Test 11/26/24  1613 02/20/24  1142 09/09/20  1102   * 155* 73     Recent Labs   Lab Test 10/14/24  1229 08/16/24  1413   NA  --  140   POTASSIUM  --  3.8   CHLORIDE  --  107   CO2  --  21*   * 182*   BUN  --  8.7   CR  --  0.94   GFRESTIMATED  --  69   NATY  --  9.1     Recent Labs   Lab Test 08/16/24  1971  "02/20/24  1142 03/24/23  1458   CR 0.94 0.90 0.89     Recent Labs   Lab Test 11/26/24  1613 06/06/24  1544 02/20/24  1142   A1C 6.2* 6.8* 6.9*          Recent Labs   Lab Test 08/16/24  1413   WBC 7.1   HGB 13.3   HCT 42.3   MCV 78   *     Recent Labs   Lab Test 08/16/24  1413 02/20/24  1142 06/28/22  1132   HGB 13.3 12.8 12.7    No results for input(s): \"TROPONINI\" in the last 46227 hours.  No results for input(s): \"BNP\", \"NTBNPI\", \"NTBNP\" in the last 99014 hours.  Recent Labs   Lab Test 06/28/22  1132   TSH 1.11     No results for input(s): \"INR\" in the last 85808 hours.     Timmy Kumari DO  Cardiologist     The longitudinal plan of care for the diagnosis(es)/condition(s) as documented were addressed during this visit. Due to the added complexity in care, I will continue to support Rimma in the subsequent management and with ongoing continuity of care.  Will follow-up on echocardiogram results.  May need further testing.                         "

## 2024-12-09 NOTE — LETTER
"12/9/2024    Aminata Trujillo, APRN CNP  [address] [place] [address]    RE: Rimma Sarmiento       Dear Colleague,     I had the pleasure of seeing Rimma Sarmiento in the [facility].    HEART CARE ENCOUNTER CONSULTATON NOTE      [facility]  [phone]      Assessment/Recommendations   Assessment:   New systolic murmur  Diabetes mellitus type 2, A1c 6.2  Cerebrovascular disease, posterior circulation CVA  Hyperlipidemia, uncontrolled  Hypertension  Obstructive sleep apnea  Obesity, BMI 36    Plan:    Complete echocardiogram to check heart valves.     Make sure to take atorvastatin and ezetimibe for cholesterol lowering.     Continue aspirin    No evidence of significant congestive heart failure based on symptomatology or examination.         History of Present Illness/Subjective    HPI: Rimma Sarmiento is a 59 year old female diabetes, obesity, hypertension, CVA who presents to cardiology clinic in consultation for possible congestive heart failure and abnormal heart sound.    According to the patient she denies any dyspnea on exertion, orthopnea or PND symptoms.  She denies any chest pain.  Review of lipids demonstrated significant rise in her LDL recently.  She states she was off her medications which is likely the cause.  Discussed the importance of maintaining adequate lipid control and she will resume her a atorvastatin and Zetia.  She continues to smoke but has decreased it down to 3 cigarettes/day.  She has a plan to discontinue smoking altogether.    Outlined that I did hear a systolic murmur.  Likely aortic sclerosis.  Will obtain echo to further evaluate    Echocardiogram Results: No recent       Physical Examination  Review of Systems   Vitals: /86 (BP Location: Right arm, Patient Position: Sitting, Cuff Size: Adult Large)   Pulse 67   Resp 20   Ht 1.613 m (5' 3.5\")   Wt 93.9 kg (207 lb 1.6 oz)   LMP  (LMP Unknown)   SpO2 92%   BMI " 36.11 kg/m    BMI= Body mass index is 36.11 kg/m .  Wt Readings from Last 3 Encounters:   12/09/24 93.9 kg (207 lb 1.6 oz)   11/26/24 95.8 kg (211 lb 3.2 oz)   08/20/24 94.3 kg (208 lb)        Pleasant, obesity noted   ENT/Mouth: membranes moist, no oral lesions or bleeding gums.      EYES:  no scleral icterus, normal conjunctivae       Chest/Lungs:   lungs are clear to auscultation, no rales or wheezing, no sternal scar, equal chest wall expansion    Cardiovascular:   Regular. Normal first and second heart sounds with 2 out of 6 early peaking systolic murmurs, rubs, or gallops; the carotid, radial and posterior tibial pulses are intact, Jugular venous pressure normal, no pitting edema bilaterally    Abdomen:  no tenderness; bowel sounds are present   Extremities: no cyanosis or clubbing   Skin: no xanthelasma, warm.    Neurologic: normal  bilateral, no tremors     Psychiatric: alert and oriented x3, calm        Please refer above for cardiac ROS details.        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 MRI IMPRESSION: 1.  No acute intracranial abnormality. 2.  Normal MRI of the internal auditory canals and labyrinthine structures. 3.  Old infarctions in the left occipital lobe and left frontal centrum [place]     CKD stage G2/A3, GFR 60-89 and albumin creatinine ratio >300 mg/g 01/18/2022     Combined forms of age-related cataract of both eyes 02/23/2022    Last Assessment & Plan:  Formatting of this note might be different from the original. Mild bilateral cataracts. Not visually significant. - Monitor     cva 2014    right sided hemiparesis, resolved     Depression      Dizzy spells     hospitalized at [facility] in the past      DM (diabetes mellitus) (H)      High cholesterol 09/17/2019     History of colonic polyps 09/24/2020 4/2/2019--hyperplastic x 1--repeat colonoscopy 5 years 6/29/2016--6 polyps, mix of tubular adenoma and hyperplastic polyps.     Hypertension      Hypertensive urgency 10/26/2013     Mixed hyperlipidemia 03/10/2010     Morbid obesity (H) 01/27/2004     Sleep apnea     uses CPAP     Tobacco use disorder 10/03/2008     Type 2 diabetes mellitus (H) 10/23/2008     Type 2 diabetes mellitus with hyperglycemia, with long-term current use of insulin (H) 10/23/2008    Diabetes dx'd:      Last A1c: HGA1C      7.0   1/4/2013     Last Chol: CHOL      199   10/16/2012 TRIG      193   10/16/2012 HDL       39   10/16/2012 LDL      171   6/9/2011     Microalbumin:  ALBCR     2832   2/8/2012        GFR      >60   10/16/2012   CREAT     0.93   10/16/2012    Smoker:  YES.  Aspirin:     Yes     Last eye exam: Diabetes dx'd:      Last A1c: HGA1C      7.0   1/4/2013     Las     Past Surgical History:   Procedure Laterality Date     CHOLECYSTECTOMY       COLONOSCOPY      4/2/2019--hyperplastic x 1--repeat colonoscopy 5 years     COLONOSCOPY N/A 10/14/2024     COLONOSCOPY WITH CO2 INSUFFLATION N/A 10/14/2024    Procedure: Colonoscopy with CO2 insufflation;  Surgeon: Aleshia Olivares DO;  Location: [facility] OR     HYSTERECTOMY TOTAL ABDOMINAL, BILATERAL SALPINGO-OOPHORECTOMY, COMBINED       SHOULDER SURGERY Bilateral      Family History   Problem Relation Age of Onset     Diabetes Mother      Hypertension Mother      Cerebrovascular Disease Mother      Cerebrovascular Disease Maternal Grandmother      Alcoholism Brother      Diabetes Brother      Alcoholism Brother      Glaucoma No family hx of      Macular Degeneration No family hx of         Social History     Socioeconomic History     Marital status:      Spouse name: Not on file     Number of children: Not on file     Years of education: Not on file     Highest education level:  Not on file   Occupational History     Not on file   Tobacco Use     Smoking status: Every Day     Current packs/day: 0.00     Types: Cigarettes     Last attempt to quit: 2019     Years since quittin.3     Smokeless tobacco: Never     Tobacco comments:     24-Smokes 3-4 cigarettes per day.      Has quit multiple times   Vaping Use     Vaping status: Never Used   Substance and Sexual Activity     Alcohol use: Not Currently     Drug use: Not Currently     Sexual activity: Not on file   Other Topics Concern     Not on file   Social History Narrative    3/2023        Lives by herself    On SSDI    Has 3 girls, 3 boys and 11 grands     Social Drivers of Health     Financial Resource Strain: Low Risk  (2024)    Financial Resource Strain      Within the past 12 months, have you or your family members you live with been unable to get utilities (heat, electricity) when it was really needed?: No   Food Insecurity: Low Risk  (2024)    Food Insecurity      Within the past 12 months, did you worry that your food would run out before you got money to buy more?: No      Within the past 12 months, did the food you bought just not last and you didn t have money to get more?: No   Transportation Needs: Unknown (2024)    Transportation Needs      Within the past 12 months, has lack of transportation kept you from medical appointments, getting your medicines, non-medical meetings or appointments, work, or from getting things that you need?: Patient declined   Physical Activity: Sufficiently Active (2024)    Exercise Vital Sign      Days of Exercise per Week: 3 days      Minutes of Exercise per Session: 50 min   Stress: Stress Concern Present (2024)    [employer] - Occupational Stress Questionnaire      Feeling of Stress : To some extent   Social Connections: Unknown (2024)    Social Connection and Isolation Panel [NHANES]      Frequency of Communication with Friends  and Family: Not on file      Frequency of Social Gatherings with Friends and Family: Never      Attends Baptism Services: Not on file      Active Member of Clubs or Organizations: Not on file      Attends Club or Organization Meetings: Not on file      Marital Status: Not on file   Interpersonal Safety: Low Risk  (10/14/2024)    Interpersonal Safety      Do you feel physically and emotionally safe where you currently live?: Yes      Within the past 12 months, have you been hit, slapped, kicked or otherwise physically hurt by someone?: No      Within the past 12 months, have you been humiliated or emotionally abused in other ways by your partner or ex-partner?: No   Housing Stability: Low Risk  (6/5/2024)    Housing Stability      Do you have housing? : Yes      Are you worried about losing your housing?: Patient declined           Medications  Allergies   Current Outpatient Medications   Medication Sig Dispense Refill     aspirin 81 MG EC tablet Take 1 tablet (81 mg) by mouth daily 90 tablet 3     atorvastatin (LIPITOR) 80 MG tablet Take 1 tablet (80 mg) by mouth daily 90 tablet 3     blood glucose (NO BRAND SPECIFIED) lancets standard Use to test blood sugar 3 times daily or as directed. 100 each 0     blood glucose (NO BRAND SPECIFIED) test strip Use to test blood sugar 3 times daily or as directed. To accompany: Blood Glucose Monitor Brands: per insurance. 300 strip 3     blood glucose (NO BRAND SPECIFIED) test strip Use to test blood sugar 3  times daily or as directed. 100 strip 0     blood glucose monitoring (NO BRAND SPECIFIED) meter device kit Use to test blood sugar  3 times times daily or as directed. 1 kit 0     blood glucose monitoring (SOFTCLIX) lancets USE TO TEST BLOOD SUGAR 3 TIMES DAILY OR AS DIRECTED.       Blood Glucose Monitoring Suppl (FIFTY50 GLUCOSE METER 2.0) w/Device KIT Check blood sugar  twice a day 1 kit 0     diclofenac (VOLTAREN) 1 % topical gel Apply 2 g to painful shoulder four  times a day as needed       escitalopram (LEXAPRO) 10 MG tablet TAKE 1 TABLET DAILY BY MOUTH 90 tablet 3     exenatide ER (BYDUREON BCISE) 2 MG/0.85ML auto-injector Inject 2 mg subcutaneously every 7 days. 6 mL 4     famotidine (PEPCID) 20 MG tablet Take 1 tablet (20 mg) by mouth 2 times daily as needed (GERD) 90 tablet 1     Fluocinolone Acetonide Scalp (DERMA-SMOOTHE/FS SCALP) 0.01 % OIL oil Apply once daily for 6 weeks to scalp, then drop down to using 3 times weekly 60 mL 3     hydrOXYzine HCl (ATARAX) 25 MG tablet TAKE 1 TABLET(25 MG) BY MOUTH EVERY NIGHT AS NEEDED FOR ANXIETY 30 tablet 3     nicotine (COMMIT) 2 MG lozenge Place 1 lozenge (2 mg) inside cheek every hour as needed for nicotine withdrawal symptoms. 27 lozenge 3     traZODone (DESYREL) 50 MG tablet Take  mg by mouth       vitamin D3 25 mcg (1000 units) tablet TAKE 4 TABS BY MOUTH DAILY 90 tablet 1     bisacodyl (DULCOLAX) 5 MG EC tablet Two days prior to exam take two (2) tablets at 4pm. One day prior to exam take two (2) tablets at 4pm (Patient not taking: Reported on 12/9/2024) 4 tablet 0     carvedilol (COREG) 25 MG tablet Take 0.5 tablets (12.5 mg) by mouth daily (Patient not taking: Reported on 12/9/2024) 180 tablet 3     ezetimibe (ZETIA) 10 MG tablet Take 1 tablet (10 mg) by mouth daily. (Patient not taking: Reported on 12/9/2024) 90 tablet 3     fluocinolone acetonide (DERMA SMOOTHE/FS BODY) 0.01 % external oil Apply topically 2 times daily (Patient not taking: Reported on 12/9/2024) 118.28 mL 4     melatonin 3 MG tablet Take 1 tablet (3 mg) by mouth nightly as needed for sleep (Patient not taking: Reported on 12/9/2024) 90 tablet 0       Allergies   Allergen Reactions     Penicillins Other (See Comments), Angioedema, Hives and Rash          Lab Results    Chemistry/lipid CBC Cardiac Enzymes/BNP/TSH/INR   Recent Labs   Lab Test 11/26/24  1613   CHOL 263*   HDL 61   *   TRIG 178*     Recent Labs   Lab Test 11/26/24  1610  "02/20/24  1142 09/09/20  1102   * 155* 73     Recent Labs   Lab Test 10/14/24  1229 08/16/24  1413   NA  --  140   POTASSIUM  --  3.8   CHLORIDE  --  107   CO2  --  21*   * 182*   BUN  --  8.7   CR  --  0.94   GFRESTIMATED  --  69   NATY  --  9.1     Recent Labs   Lab Test 08/16/24  1413 02/20/24  1142 03/24/23  1458   CR 0.94 0.90 0.89     Recent Labs   Lab Test 11/26/24  1613 06/06/24  1544 02/20/24  1142   A1C 6.2* 6.8* 6.9*          Recent Labs   Lab Test 08/16/24  1413   WBC 7.1   HGB 13.3   HCT 42.3   MCV 78   *     Recent Labs   Lab Test 08/16/24  1413 02/20/24  1142 06/28/22  1132   HGB 13.3 12.8 12.7    No results for input(s): \"TROPONINI\" in the last 42642 hours.  No results for input(s): \"BNP\", \"NTBNPI\", \"NTBNP\" in the last 23964 hours.  Recent Labs   Lab Test 06/28/22  1132   TSH 1.11     No results for input(s): \"INR\" in the last 42991 hours.     Timmy Kumari DO  Cardiologist     The longitudinal plan of care for the diagnosis(es)/condition(s) as documented were addressed during this visit. Due to the added complexity in care, I will continue to support Rimma in the subsequent management and with ongoing continuity of care.  Will follow-up on echocardiogram results.  May need further testing.                             Thank you for allowing me to participate in the care of your patient.      Sincerely,     Timmy Kumari DO     [facility]  cc:   Aminata Trujillo, APRN CNP  [address] [place] [address]      "

## 2024-12-09 NOTE — PATIENT INSTRUCTIONS
Please contact direct nurses line Monday through Friday 8 AM to 5 PM @ [phone]    After-hours contact cardiology office at [phone].    Plan:    Complete echocardiogram to check heart valves.     Make sure to take atorvastatin and ezetimibe for cholesterol lowering.    Continue aspirin

## 2024-12-30 ENCOUNTER — HOSPITAL ENCOUNTER (OUTPATIENT)
Dept: CARDIOLOGY | Facility: CLINIC | Age: 59
Discharge: HOME | End: 2024-12-30
Attending: INTERNAL MEDICINE | Admitting: INTERNAL MEDICINE
Payer: COMMERCIAL

## 2024-12-30 DIAGNOSIS — E11.65 TYPE 2 DIABETES MELLITUS WITH HYPERGLYCEMIA, WITHOUT LONG-TERM CURRENT USE OF INSULIN (H): ICD-10-CM

## 2024-12-30 DIAGNOSIS — R01.1 SYSTOLIC MURMUR: ICD-10-CM

## 2024-12-30 PROCEDURE — 93306 TTE W/DOPPLER COMPLETE: CPT

## 2024-12-30 PROCEDURE — 93306 TTE W/DOPPLER COMPLETE: CPT | Mod: 26 | Performed by: INTERNAL MEDICINE

## 2025-01-19 DIAGNOSIS — K21.9 GASTROESOPHAGEAL REFLUX DISEASE WITHOUT ESOPHAGITIS: ICD-10-CM

## 2025-01-20 RX ORDER — FAMOTIDINE 20 MG/1
20 TABLET, FILM COATED ORAL 2 TIMES DAILY PRN
Qty: 90 TABLET | Refills: 1 | Status: SHIPPED | OUTPATIENT
Start: 2025-01-20

## 2025-01-31 ENCOUNTER — HOSPITAL ENCOUNTER (INPATIENT)
Facility: CLINIC | Age: 60
LOS: 1 days | Discharge: HOME | End: 2025-02-01
Attending: STUDENT IN AN ORGANIZED HEALTH CARE EDUCATION/TRAINING PROGRAM | Admitting: STUDENT IN AN ORGANIZED HEALTH CARE EDUCATION/TRAINING PROGRAM
Payer: COMMERCIAL

## 2025-01-31 ENCOUNTER — APPOINTMENT (OUTPATIENT)
Dept: CT IMAGING | Facility: CLINIC | Age: 60
End: 2025-01-31
Attending: STUDENT IN AN ORGANIZED HEALTH CARE EDUCATION/TRAINING PROGRAM
Payer: COMMERCIAL

## 2025-01-31 ENCOUNTER — APPOINTMENT (OUTPATIENT)
Dept: MRI IMAGING | Facility: CLINIC | Age: 60
End: 2025-01-31
Attending: STUDENT IN AN ORGANIZED HEALTH CARE EDUCATION/TRAINING PROGRAM
Payer: COMMERCIAL

## 2025-01-31 DIAGNOSIS — Z98.890 HISTORY OF ELECTROPHYSIOLOGIC STUDY: ICD-10-CM

## 2025-01-31 DIAGNOSIS — G45.9 TIA (TRANSIENT ISCHEMIC ATTACK): ICD-10-CM

## 2025-01-31 DIAGNOSIS — E78.2 MIXED HYPERLIPIDEMIA: ICD-10-CM

## 2025-01-31 DIAGNOSIS — I47.29 VENTRICULAR TACHYCARDIA, NON-SUSTAINED (H): ICD-10-CM

## 2025-01-31 DIAGNOSIS — I10 ESSENTIAL HYPERTENSION: ICD-10-CM

## 2025-01-31 DIAGNOSIS — Z71.6 TOBACCO ABUSE COUNSELING: ICD-10-CM

## 2025-01-31 DIAGNOSIS — E11.69 TYPE 2 DIABETES MELLITUS WITH OTHER SPECIFIED COMPLICATION, WITH LONG-TERM CURRENT USE OF INSULIN (H): ICD-10-CM

## 2025-01-31 DIAGNOSIS — Z79.4 TYPE 2 DIABETES MELLITUS WITH OTHER SPECIFIED COMPLICATION, WITH LONG-TERM CURRENT USE OF INSULIN (H): ICD-10-CM

## 2025-01-31 DIAGNOSIS — I63.433 CEREBROVASCULAR ACCIDENT (CVA) DUE TO BILATERAL EMBOLISM OF POSTERIOR CEREBRAL ARTERIES (H): Primary | ICD-10-CM

## 2025-01-31 LAB
ALBUMIN UR-MCNC: 300 MG/DL
ANION GAP SERPL CALCULATED.3IONS-SCNC: 11 MMOL/L (ref 7–15)
APPEARANCE UR: CLEAR
APTT PPP: 31 SECONDS (ref 22–38)
BASOPHILS # BLD AUTO: 0.1 10E3/UL (ref 0–0.2)
BASOPHILS NFR BLD AUTO: 1 %
BILIRUB UR QL STRIP: NEGATIVE
BUN SERPL-MCNC: 15.7 MG/DL (ref 8–23)
CALCIUM SERPL-MCNC: 10.7 MG/DL (ref 8.8–10.4)
CHLORIDE SERPL-SCNC: 108 MMOL/L (ref 98–107)
CHOLEST SERPL-MCNC: 268 MG/DL
COLOR UR AUTO: YELLOW
CREAT BLD-MCNC: 1 MG/DL (ref 0.5–1)
CREAT SERPL-MCNC: 1.08 MG/DL (ref 0.51–0.95)
EGFRCR SERPLBLD CKD-EPI 2021: 59 ML/MIN/1.73M2
EGFRCR SERPLBLD CKD-EPI 2021: >60 ML/MIN/1.73M2
EOSINOPHIL # BLD AUTO: 0.3 10E3/UL (ref 0–0.7)
EOSINOPHIL NFR BLD AUTO: 3 %
ERYTHROCYTE [DISTWIDTH] IN BLOOD BY AUTOMATED COUNT: 13.7 % (ref 10–15)
EST. AVERAGE GLUCOSE BLD GHB EST-MCNC: 123 MG/DL
GLUCOSE BLDC GLUCOMTR-MCNC: 85 MG/DL (ref 70–99)
GLUCOSE BLDC GLUCOMTR-MCNC: 92 MG/DL (ref 70–99)
GLUCOSE SERPL-MCNC: 103 MG/DL (ref 70–99)
GLUCOSE UR STRIP-MCNC: NEGATIVE MG/DL
HBA1C MFR BLD: 5.9 %
HCO3 SERPL-SCNC: 23 MMOL/L (ref 22–29)
HCT VFR BLD AUTO: 41.4 % (ref 35–47)
HDLC SERPL-MCNC: 56 MG/DL
HGB BLD-MCNC: 13.4 G/DL (ref 11.7–15.7)
HGB UR QL STRIP: NEGATIVE
HOLD SPECIMEN: NORMAL
HOLD SPECIMEN: NORMAL
HYALINE CASTS: 1 /LPF
IMM GRANULOCYTES # BLD: 0 10E3/UL
IMM GRANULOCYTES NFR BLD: 0 %
INR BLD: 1
INR PPP: 0.96 (ref 0.85–1.15)
KETONES UR STRIP-MCNC: NEGATIVE MG/DL
LDLC SERPL CALC-MCNC: 180 MG/DL
LEUKOCYTE ESTERASE UR QL STRIP: NEGATIVE
LYMPHOCYTES # BLD AUTO: 2.3 10E3/UL (ref 0.8–5.3)
LYMPHOCYTES NFR BLD AUTO: 24 %
MAGNESIUM SERPL-MCNC: 2.1 MG/DL (ref 1.7–2.3)
MCH RBC QN AUTO: 25.8 PG (ref 26.5–33)
MCHC RBC AUTO-ENTMCNC: 32.4 G/DL (ref 31.5–36.5)
MCV RBC AUTO: 80 FL (ref 78–100)
MONOCYTES # BLD AUTO: 0.6 10E3/UL (ref 0–1.3)
MONOCYTES NFR BLD AUTO: 6 %
MUCOUS THREADS #/AREA URNS LPF: PRESENT /LPF
NEUTROPHILS # BLD AUTO: 6.3 10E3/UL (ref 1.6–8.3)
NEUTROPHILS NFR BLD AUTO: 66 %
NITRATE UR QL: NEGATIVE
NONHDLC SERPL-MCNC: 212 MG/DL
NRBC # BLD AUTO: 0 10E3/UL
NRBC BLD AUTO-RTO: 0 /100
PH UR STRIP: 6 [PH] (ref 5–7)
PHOSPHATE SERPL-MCNC: 4.1 MG/DL (ref 2.5–4.5)
PLATELET # BLD AUTO: 368 10E3/UL (ref 150–450)
POTASSIUM SERPL-SCNC: 4.4 MMOL/L (ref 3.4–5.3)
RADIOLOGIST FLAGS: ABNORMAL
RBC # BLD AUTO: 5.19 10E6/UL (ref 3.8–5.2)
RBC URINE: <1 /HPF
SODIUM SERPL-SCNC: 142 MMOL/L (ref 135–145)
SP GR UR STRIP: 1.03 (ref 1–1.03)
SQUAMOUS EPITHELIAL: 2 /HPF
TRIGL SERPL-MCNC: 161 MG/DL
TROPONIN T SERPL HS-MCNC: 11 NG/L
UROBILINOGEN UR STRIP-MCNC: NORMAL MG/DL
WBC # BLD AUTO: 9.7 10E3/UL (ref 4–11)
WBC URINE: <1 /HPF

## 2025-01-31 PROCEDURE — 70553 MRI BRAIN STEM W/O & W/DYE: CPT | Mod: 26 | Performed by: RADIOLOGY

## 2025-01-31 PROCEDURE — 82962 GLUCOSE BLOOD TEST: CPT

## 2025-01-31 PROCEDURE — 80061 LIPID PANEL: CPT | Performed by: STUDENT IN AN ORGANIZED HEALTH CARE EDUCATION/TRAINING PROGRAM

## 2025-01-31 PROCEDURE — 84484 ASSAY OF TROPONIN QUANT: CPT | Performed by: STUDENT IN AN ORGANIZED HEALTH CARE EDUCATION/TRAINING PROGRAM

## 2025-01-31 PROCEDURE — A9585 GADOBUTROL INJECTION: HCPCS | Performed by: STUDENT IN AN ORGANIZED HEALTH CARE EDUCATION/TRAINING PROGRAM

## 2025-01-31 PROCEDURE — 84100 ASSAY OF PHOSPHORUS: CPT | Performed by: STUDENT IN AN ORGANIZED HEALTH CARE EDUCATION/TRAINING PROGRAM

## 2025-01-31 PROCEDURE — 85730 THROMBOPLASTIN TIME PARTIAL: CPT | Performed by: STUDENT IN AN ORGANIZED HEALTH CARE EDUCATION/TRAINING PROGRAM

## 2025-01-31 PROCEDURE — 82565 ASSAY OF CREATININE: CPT

## 2025-01-31 PROCEDURE — 250N000013 HC RX MED GY IP 250 OP 250 PS 637: Performed by: STUDENT IN AN ORGANIZED HEALTH CARE EDUCATION/TRAINING PROGRAM

## 2025-01-31 PROCEDURE — 83735 ASSAY OF MAGNESIUM: CPT | Performed by: STUDENT IN AN ORGANIZED HEALTH CARE EDUCATION/TRAINING PROGRAM

## 2025-01-31 PROCEDURE — 70496 CT ANGIOGRAPHY HEAD: CPT

## 2025-01-31 PROCEDURE — 250N000013 HC RX MED GY IP 250 OP 250 PS 637

## 2025-01-31 PROCEDURE — 70450 CT HEAD/BRAIN W/O DYE: CPT

## 2025-01-31 PROCEDURE — 85004 AUTOMATED DIFF WBC COUNT: CPT | Performed by: STUDENT IN AN ORGANIZED HEALTH CARE EDUCATION/TRAINING PROGRAM

## 2025-01-31 PROCEDURE — 36415 COLL VENOUS BLD VENIPUNCTURE: CPT | Performed by: STUDENT IN AN ORGANIZED HEALTH CARE EDUCATION/TRAINING PROGRAM

## 2025-01-31 PROCEDURE — 999N000175 HC STATISTIC STROKE CODE W/ ACCESS

## 2025-01-31 PROCEDURE — 80048 BASIC METABOLIC PNL TOTAL CA: CPT | Performed by: STUDENT IN AN ORGANIZED HEALTH CARE EDUCATION/TRAINING PROGRAM

## 2025-01-31 PROCEDURE — 250N000011 HC RX IP 250 OP 636: Performed by: STUDENT IN AN ORGANIZED HEALTH CARE EDUCATION/TRAINING PROGRAM

## 2025-01-31 PROCEDURE — 70496 CT ANGIOGRAPHY HEAD: CPT | Mod: 26 | Performed by: RADIOLOGY

## 2025-01-31 PROCEDURE — 99291 CRITICAL CARE FIRST HOUR: CPT | Performed by: STUDENT IN AN ORGANIZED HEALTH CARE EDUCATION/TRAINING PROGRAM

## 2025-01-31 PROCEDURE — 81001 URINALYSIS AUTO W/SCOPE: CPT | Performed by: STUDENT IN AN ORGANIZED HEALTH CARE EDUCATION/TRAINING PROGRAM

## 2025-01-31 PROCEDURE — 85610 PROTHROMBIN TIME: CPT | Performed by: STUDENT IN AN ORGANIZED HEALTH CARE EDUCATION/TRAINING PROGRAM

## 2025-01-31 PROCEDURE — 85610 PROTHROMBIN TIME: CPT

## 2025-01-31 PROCEDURE — 255N000002 HC RX 255 OP 636: Performed by: STUDENT IN AN ORGANIZED HEALTH CARE EDUCATION/TRAINING PROGRAM

## 2025-01-31 PROCEDURE — 70553 MRI BRAIN STEM W/O & W/DYE: CPT

## 2025-01-31 PROCEDURE — 120N000002 HC R&B MED SURG/OB UMMC

## 2025-01-31 PROCEDURE — 70498 CT ANGIOGRAPHY NECK: CPT | Mod: 26 | Performed by: RADIOLOGY

## 2025-01-31 PROCEDURE — 83036 HEMOGLOBIN GLYCOSYLATED A1C: CPT | Performed by: STUDENT IN AN ORGANIZED HEALTH CARE EDUCATION/TRAINING PROGRAM

## 2025-01-31 PROCEDURE — 82374 ASSAY BLOOD CARBON DIOXIDE: CPT | Performed by: STUDENT IN AN ORGANIZED HEALTH CARE EDUCATION/TRAINING PROGRAM

## 2025-01-31 RX ORDER — ASPIRIN 325 MG
325 TABLET ORAL ONCE
Status: COMPLETED | OUTPATIENT
Start: 2025-01-31 | End: 2025-01-31

## 2025-01-31 RX ORDER — LORAZEPAM 2 MG/ML
2 INJECTION INTRAMUSCULAR
Status: COMPLETED | OUTPATIENT
Start: 2025-01-31 | End: 2025-01-31

## 2025-01-31 RX ORDER — ACETAMINOPHEN 650 MG/1
650 SUPPOSITORY RECTAL EVERY 4 HOURS PRN
Status: DISCONTINUED | OUTPATIENT
Start: 2025-01-31 | End: 2025-02-01 | Stop reason: HOSPADM

## 2025-01-31 RX ORDER — ESCITALOPRAM OXALATE 10 MG/1
10 TABLET ORAL DAILY
Status: DISCONTINUED | OUTPATIENT
Start: 2025-02-01 | End: 2025-02-01 | Stop reason: HOSPADM

## 2025-01-31 RX ORDER — CLOPIDOGREL BISULFATE 75 MG/1
300 TABLET ORAL ONCE
Status: COMPLETED | OUTPATIENT
Start: 2025-01-31 | End: 2025-01-31

## 2025-01-31 RX ORDER — ACETAMINOPHEN 325 MG/10.15ML
650 LIQUID ORAL EVERY 4 HOURS PRN
Status: DISCONTINUED | OUTPATIENT
Start: 2025-01-31 | End: 2025-01-31

## 2025-01-31 RX ORDER — LIDOCAINE 40 MG/G
CREAM TOPICAL
Status: DISCONTINUED | OUTPATIENT
Start: 2025-01-31 | End: 2025-02-01 | Stop reason: HOSPADM

## 2025-01-31 RX ORDER — IOPAMIDOL 755 MG/ML
117 INJECTION, SOLUTION INTRAVASCULAR ONCE
Status: COMPLETED | OUTPATIENT
Start: 2025-01-31 | End: 2025-01-31

## 2025-01-31 RX ORDER — ENOXAPARIN SODIUM 100 MG/ML
40 INJECTION SUBCUTANEOUS EVERY 24 HOURS
Status: DISCONTINUED | OUTPATIENT
Start: 2025-02-01 | End: 2025-02-01 | Stop reason: HOSPADM

## 2025-01-31 RX ORDER — ONDANSETRON 4 MG/1
4 TABLET, ORALLY DISINTEGRATING ORAL EVERY 6 HOURS PRN
Status: DISCONTINUED | OUTPATIENT
Start: 2025-01-31 | End: 2025-02-01 | Stop reason: HOSPADM

## 2025-01-31 RX ORDER — CLOPIDOGREL BISULFATE 75 MG/1
300 TABLET ORAL ONCE
Status: DISCONTINUED | OUTPATIENT
Start: 2025-01-31 | End: 2025-01-31

## 2025-01-31 RX ORDER — ATORVASTATIN CALCIUM 40 MG/1
80 TABLET, FILM COATED ORAL EVERY EVENING
Status: DISCONTINUED | OUTPATIENT
Start: 2025-01-31 | End: 2025-02-01 | Stop reason: HOSPADM

## 2025-01-31 RX ORDER — EZETIMIBE 10 MG/1
10 TABLET ORAL DAILY
Status: DISCONTINUED | OUTPATIENT
Start: 2025-02-01 | End: 2025-02-01 | Stop reason: HOSPADM

## 2025-01-31 RX ORDER — GADOBUTROL 604.72 MG/ML
10 INJECTION INTRAVENOUS ONCE
Status: COMPLETED | OUTPATIENT
Start: 2025-01-31 | End: 2025-01-31

## 2025-01-31 RX ORDER — ONDANSETRON 2 MG/ML
4 INJECTION INTRAMUSCULAR; INTRAVENOUS EVERY 6 HOURS PRN
Status: DISCONTINUED | OUTPATIENT
Start: 2025-01-31 | End: 2025-02-01 | Stop reason: HOSPADM

## 2025-01-31 RX ORDER — ACETAMINOPHEN 325 MG/1
650 TABLET ORAL EVERY 4 HOURS PRN
Status: DISCONTINUED | OUTPATIENT
Start: 2025-01-31 | End: 2025-02-01 | Stop reason: HOSPADM

## 2025-01-31 RX ORDER — ASPIRIN 81 MG/1
81 TABLET, CHEWABLE ORAL DAILY
Status: DISCONTINUED | OUTPATIENT
Start: 2025-02-01 | End: 2025-02-01 | Stop reason: HOSPADM

## 2025-01-31 RX ORDER — CARVEDILOL 12.5 MG/1
12.5 TABLET ORAL DAILY
Status: DISCONTINUED | OUTPATIENT
Start: 2025-01-31 | End: 2025-02-01 | Stop reason: HOSPADM

## 2025-01-31 RX ORDER — FAMOTIDINE 20 MG/1
20 TABLET, FILM COATED ORAL 2 TIMES DAILY PRN
Status: DISCONTINUED | OUTPATIENT
Start: 2025-01-31 | End: 2025-02-01 | Stop reason: HOSPADM

## 2025-01-31 RX ADMIN — ASPIRIN 325 MG ORAL TABLET 325 MG: 325 PILL ORAL at 17:11

## 2025-01-31 RX ADMIN — ATORVASTATIN CALCIUM 80 MG: 40 TABLET, FILM COATED ORAL at 20:16

## 2025-01-31 RX ADMIN — GADOBUTROL 10 ML: 604.72 INJECTION INTRAVENOUS at 18:41

## 2025-01-31 RX ADMIN — IOPAMIDOL 67 ML: 755 INJECTION, SOLUTION INTRAVENOUS at 14:34

## 2025-01-31 RX ADMIN — ACETAMINOPHEN 650 MG: 325 TABLET, FILM COATED ORAL at 17:11

## 2025-01-31 RX ADMIN — CLOPIDOGREL BISULFATE 300 MG: 75 TABLET ORAL at 17:11

## 2025-01-31 RX ADMIN — LORAZEPAM 2 MG: 2 INJECTION INTRAMUSCULAR; INTRAVENOUS at 17:51

## 2025-01-31 ASSESSMENT — ACTIVITIES OF DAILY LIVING (ADL)
ADLS_ACUITY_SCORE: 41
ADLS_ACUITY_SCORE: 54

## 2025-01-31 NOTE — PROGRESS NOTES
Stroke Code Nurse-Responder Note    Arrival Time to Stroke Code: 1425    Stroke Code Team interventions:   - De-escalated at 1501 by Deejay Hall    ED/Bedside Nurse providing handoff: Shaggy Myles    Time left for CT: 1428    Time arrived to next location (ED): 1445    ED/Bedside Nurse given handoff (name/time): Shaggy 1500    Alexia Stahl RN

## 2025-01-31 NOTE — ED PROVIDER NOTES
[place] EMERGENCY DEPARTMENT ([facility])    1/31/25       ED PROVIDER NOTE       History     Chief Complaint   Patient presents with    Stroke Symptoms     HPI  Rimma Sarmiento is a 59 year old female with a history of type 2 diabetes, CKD, with a history cerebral infarction with bilateral embolism of posterior cerebral arteries, hypertension, heart failure with preserved ejection fraction who presents to the emergency department via EMS from home with stroke symptoms.    Around 1:45 PM, patient developed right side weakness, numbness, and dysphagia difficulty speaking.  She tried to get up to walk, took a few steps, and then fell forward.  Her symptoms resolved within the 10 minutes it took EMS to get there. In route, patient still seemed off, was sluggish and had some dysphagia.  Upon arrival to the ED, EMS noted a right-sided facial droop, asked the patient to squeeze their hand and she was unable to move her right upper extremity.  Blood sugar was 117.    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 MRI IMPRESSION: 1.  No acute intracranial abnormality. 2.  Normal MRI of the internal auditory canals and labyrinthine structures. 3.  Old infarctions in the left occipital lobe and left frontal centrum [place]    CKD stage G2/A3, GFR 60-89 and albumin creatinine ratio >300 mg/g 01/18/2022    Combined forms of age-related cataract of both eyes 02/23/2022    Last Assessment & Plan:  Formatting of this note might be different from the original. Mild bilateral cataracts. Not visually significant. - Monitor    cva 2014    right sided hemiparesis, resolved    Depression     Dizzy spells     hospitalized at [place] in the past    DM (diabetes mellitus) (H)     High  cholesterol 09/17/2019    History of colonic polyps 09/24/2020 4/2/2019--hyperplastic x 1--repeat colonoscopy 5 years 6/29/2016--6 polyps, mix of tubular adenoma and hyperplastic polyps.    Hypertension     Hypertensive urgency 10/26/2013    Mixed hyperlipidemia 03/10/2010    Morbid obesity (H) 01/27/2004    Sleep apnea     uses CPAP    Tobacco use disorder 10/03/2008    Type 2 diabetes mellitus (H) 10/23/2008    Type 2 diabetes mellitus with hyperglycemia, with long-term current use of insulin (H) 10/23/2008    Diabetes dx'd:      Last A1c: HGA1C      7.0   1/4/2013     Last Chol: CHOL      199   10/16/2012 TRIG      193   10/16/2012 HDL       39   10/16/2012 LDL      171   6/9/2011     Microalbumin:  ALBCR     2832   2/8/2012        GFR      >60   10/16/2012   CREAT     0.93   10/16/2012    Smoker:  YES.  Aspirin:     Yes     Last eye exam: Diabetes dx'd:      Last A1c: HGA1C      7.0   1/4/2013     Las     Past Surgical History:   Procedure Laterality Date    CHOLECYSTECTOMY      COLONOSCOPY      4/2/2019--hyperplastic x 1--repeat colonoscopy 5 years    COLONOSCOPY N/A 10/14/2024    COLONOSCOPY WITH CO2 INSUFFLATION N/A 10/14/2024    Procedure: Colonoscopy with CO2 insufflation;  Surgeon: Aleshia Olivares DO;  Location: [facility] OR    HYSTERECTOMY TOTAL ABDOMINAL, BILATERAL SALPINGO-OOPHORECTOMY, COMBINED      SHOULDER SURGERY Bilateral      aspirin 81 MG EC tablet  atorvastatin (LIPITOR) 80 MG tablet  bisacodyl (DULCOLAX) 5 MG EC tablet  blood glucose (NO BRAND SPECIFIED) lancets standard  blood glucose (NO BRAND SPECIFIED) test strip  blood glucose (NO BRAND SPECIFIED) test strip  blood glucose monitoring (NO BRAND SPECIFIED) meter device kit  blood glucose monitoring (SOFTCLIX) lancets  Blood Glucose Monitoring Suppl (FIFTY50 GLUCOSE METER 2.0) w/Device KIT  carvedilol (COREG) 25 MG tablet  diclofenac (VOLTAREN) 1 % topical gel  escitalopram (LEXAPRO) 10 MG tablet  exenatide ER (BYDUREON BCISE) 2 MG/0.85ML  auto-injector  ezetimibe (ZETIA) 10 MG tablet  famotidine (PEPCID) 20 MG tablet  fluocinolone acetonide (DERMA SMOOTHE/FS BODY) 0.01 % external oil  Fluocinolone Acetonide Scalp (DERMA-SMOOTHE/FS SCALP) 0.01 % OIL oil  hydrOXYzine HCl (ATARAX) 25 MG tablet  melatonin 3 MG tablet  nicotine (COMMIT) 2 MG lozenge  traZODone (DESYREL) 50 MG tablet  vitamin D3 25 mcg (1000 units) tablet      Allergies   Allergen Reactions    Penicillins Other (See Comments), Angioedema, Hives and Rash     Family History  Family History   Problem Relation Age of Onset    Diabetes Mother     Hypertension Mother     Cerebrovascular Disease Mother     Cerebrovascular Disease Maternal Grandmother     Alcoholism Brother     Diabetes Brother     Alcoholism Brother     Glaucoma No family hx of     Macular Degeneration No family hx of      Social History   Social History     Tobacco Use    Smoking status: Every Day     Current packs/day: 0.00     Types: Cigarettes     Last attempt to quit: 2019     Years since quittin.4    Smokeless tobacco: Never    Tobacco comments:     24-Smokes 3-4 cigarettes per day.      Has quit multiple times   Vaping Use    Vaping status: Never Used   Substance Use Topics    Alcohol use: Not Currently    Drug use: Not Currently      A medically appropriate review of systems was performed with pertinent positives and negatives noted in the HPI, and all other systems negative.    Physical Exam      Physical Exam  Vitals and nursing note reviewed.   Constitutional:       General: She is not in acute distress.     Appearance: Normal appearance. She is not diaphoretic.   HENT:      Head: Atraumatic.      Mouth/Throat:      Mouth: Mucous membranes are moist.   Eyes:      General: No scleral icterus.     Extraocular Movements: Extraocular movements intact.      Conjunctiva/sclera: Conjunctivae normal.      Pupils: Pupils are equal, round, and reactive to light.   Cardiovascular:      Rate and Rhythm: Normal rate.       Heart sounds: Normal heart sounds.   Pulmonary:      Effort: No respiratory distress.      Breath sounds: Normal breath sounds.   Abdominal:      General: Abdomen is flat.   Musculoskeletal:      Cervical back: Neck supple.   Skin:     General: Skin is warm.      Findings: No rash.   Neurological:      Mental Status: She is alert.      Comments: Initial physical exam showed right sided upper and lower extremity deficits and patient reported no sensory loss, slurred speech with no facial droop, no inattention, extraocular movements intact no pain, patient appears alert and oriented, but appeared initially unable to speak.  On repeat exam patient without any symptoms           ED Course, Procedures, & Data      Procedures            EKG Interpretation:      Interpreted by Emmanuel Lindsay MD  Time reviewed: 14:46:54  Symptoms at time of EKG: Stroke symptoms   Rhythm: Sinus rhythm with 1st degree AV block  Rate: 74 bpm  Axis: Normal  Ectopy: None  Conduction: Normal  ST Segments/ T Waves: No ST-T wave changes and No acute ischemic changes  Q Waves: None    Clinical Impression: Sinus rhythm with 1st degree AV block with no acute ischemia             No results found for any visits on 01/31/25.  Medications - No data to display  Labs Ordered and Resulted from Time of ED Arrival to Time of ED Departure - No data to display  CT Head w/o Contrast    (Results Pending)   CTA Head Neck with Contrast    (Results Pending)   CT Head Perfusion w Contrast - For Tier 2 Stroke    (Results Pending)          Critical Care Addendum  My initial assessment, based on my review of prehospital provider report, review of nursing observations, review of vital signs, focused history, physical exam, review of cardiac rhythm monitor, 12 lead ECG analysis, and discussion with neurology , established a high suspicion that Rimma Sarmiento has altered mental status and concern for stroke or TIA , which requires immediate intervention, and  therefore she is critically ill.     After the initial assessment, the care team initiated multiple lab tests, initiated IV fluid administration, and consulted with neurology  to provide stabilization care. Due to the critical nature of this patient, I reassessed nursing observations, vital signs, physical exam, review of cardiac rhythm monitor, 12 lead ECG analysis, interpretation of labs and imaging, and mental status multiple times prior to her disposition.     Time also spent performing documentation, reviewing test results, discussion with consultants, and coordination of care.     Critical care time (excluding teaching time and procedures): 30 minutes.       Assessment & Plan    Here in the emergency room patient's labs reviewed and interpreted by me are reassuring  And patient arrived, called tier 1 stroke based on symptoms, but when patient was returned back from CT scan, patient symptoms were gone  Her CT head and CTA head and neck are also read is reassuring, but not able to fully evaluate for stroke so radiology recommends MRI  Discussed case with neurology attending Dr. Hall who recommends admission to neurology for TIA.  Patient admitted to neurology    I have reviewed the nursing notes. I have reviewed the findings, diagnosis, plan and need for follow up with the patient.    New Prescriptions    No medications on file       Final diagnoses:   None       Emmanuel Lindsay MD  [facility] EMERGENCY DEPARTMENT  1/31/2025     Emmanuel Lindsay MD  01/31/25 1536

## 2025-01-31 NOTE — H&P
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     LKW 1:45 PM when she had sit down to watch TV with her friend. Ten minutes later, she got up from chair and she states that her right leg gave out. Her friend states that she had a controlled fall to the ground and fell on her butt. He sat her down and she was noted to have right arm weakness and slurry speech. Her friend called EMS and her symptoms resolved by the time of their arrival. Right sided weakness and speech difficulties recurred en route to the hospital.     On my initial exam, she had stuttering speech and could speak/repeat words correctly with coaching. She did not spontaneously lift her right arm up on command. When I lifted her arm up and with coaching, she was able to keep her arm up for 10 seconds without drift. Initial NIHSS 3 for mild loss of fluency and chronic right hemianopia. After completing CT scans, her exam returned to baseline with clear/fluent speech and strength 5/5 on all extremities.     Patient states that she had right sided weakness with her prior PCA stroke (? 2010 based on notes, no records/images available in system). Her motor deficits resolved leaving her with right hemianopia and she does not drive.          Past Medical History     Stroke risk factors: Left PCA stroke, Right basal ganglia stroke, HTN, T2DM, HLD, SUSIE, Obesity  Preadmission antithrombotic regimen: Aspirin 81 mg daily     Modified Nidhi Score (Pre-morbid)  2-Slight disability; unable to carry out all previous activities, but able to look after own affairs         Imaging/Labs   (personally reviewed images below)    CT head: Chronic left PCA stroke and right basal ganglia stroke  CTA head/neck: Chronic left P1 near occlusion with diminutive distal branches    Intravenous Thrombolysis  Not given due to:   - minor/isolated/quickly resolving symptoms     Endovascular Treatment  Proximal vessel occlusion present, but endovascular treatment not initiated due to chronic left P1 occlusion                 Assessment and Plan       Rimma Sarmiento is a 59 year old female with history of HTN, T2DM, uncontrolled HLD, 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 Symptoms resolved on arrival. Chronic left PCA stroke, right basal ganglia stroke and chronic left PCA steno-occlusive disease noted on imaging.     Presentation concerning for TIA (ABCD2 score 5), will admit for further evaluation.      Transient Ischemic Attack   Chronic left PCA stroke & right BG stroke  - Admit to Stroke service for further evaluation  - Daily aspirin 81 mg for secondary stroke prevention  - Plavix (clopidogrel) 300 mg PO loading dose x 1  - Plavix (clopidogrel) 75 mg PO Daily x 21 days  - Statin: Lipitor 80 mg nightly   - MRI Brain with and without contrast   - TTE (with Bubble Study if age 60 yrs or less)  - Will consider need for KATHIA/Hypercoagulable workup pending MRI  - Neurochecks and Vital Signs every 4 hrs   - Permissive HTN; goal SBP < 180 mmHg  - Telemetry, EKG  - Bedside Glucose Monitoring  - A1c, Lipid Panel, Troponin x 3  - PT/OT/SLP  - Stroke Education  - Euthermia, Euglycemia  - Zio patch on discharge  - Stroke clinic follow up in 6-8 weeks    Hypertension  - Allow permissive hypertension w/ SBP goal < 180  - Hold home Coreg, may resume tomorrow    Type 2 Diabetes (A1c 11/2024 - 6.2)  - Sliding scale Insulin while inpatient  - Takes home  "Exenatide SQ    Uncontrolled Hyperlipidemia (LDL 11/2024 - 166)  - Continue Lipitor 80 mg nightly and Zetia 10 mg daily  - Will need to consider PCSK9 inhibitors if compliant with above medications    Tobacco use  - Smoking cessation counseling  - Nicotine patches if needed for cravings    SUSIE  - Continue home CPAP   ___________________________________________________________________    Deejay Hall MD       Department of Neurology       Securely message with the Vocera Web Console (learn more here)   To page me or covering stroke neurology team member, click here: AMCOM  Choose \"On Call\" tab at top, then select \"NEUROLOGY/ALL SITES\" from middle drop-down box, press Enter, then look for \"stroke\" or \"telestroke\" for your site.  ___________________________________________________________________           Physical Examination     BP: (!) 145/77   Pulse: 78   Resp: 18           SpO2: 100 %       Weight: 96.5 kg (212 lb 11.9 oz)    Wt Readings from Last 2 Encounters:   01/31/25 96.5 kg (212 lb 11.9 oz)   12/09/24 93.9 kg (207 lb 1.6 oz)       Neurologic  Mental Status:  alert, oriented x 3, follows commands, speech clear and fluent, naming and repetition normal  Cranial Nerves:  Dense right homonymous hemianopia, PERRL, EOMI with normal smooth pursuit, facial sensation intact and symmetric, facial movements symmetric, no dysarthria  Motor:  normal muscle tone and bulk, no abnormal movements, able to move all limbs spontaneously, strength 5/5 throughout upper and lower extremities, no pronator drift  Sensory:  light touch sensation intact and symmetric throughout upper and lower extremities, no extinction on double simultaneous stimulation   Coordination:  normal finger-to-nose and heel-to-shin bilaterally without dysmetria  Station/Gait:  deferred        Stroke Scales       NIHSS  1a. Level of Consciousness 0-->Alert, keenly responsive   1b. LOC Questions 0-->Answers both questions correctly "   1c. LOC Commands 0-->Performs both tasks correctly   2.   Best Gaze 0-->Normal   3.   Visual 2-->Homonymous hemianopia   4.   Facial Palsy 0-->Normal symmetrical movements   5a. Motor Arm, Left 0-->No drift, limb holds 90 (or 45) degrees for full 10 secs   5b. Motor Arm, Right 0-->No drift, limb holds 90 (or 45) degrees for full 10 secs   6a. Motor Leg, Left 0-->No drift, leg holds 30 degree position for full 5 secs   6b. Motor Leg, right 0-->No drift, leg holds 30 degree position for full 5 secs   7.   Limb Ataxia 0-->Absent   8.   Sensory 0-->Normal, no sensory loss   9.   Best Language 1-->Mild-to-moderate aphasia, some obvious loss of fluency or facility of comprehension, without significant limitation on ideas expressed or form of expression. Reduction of speech and/or comprehension, however, makes conversation. . . (see row details)   10. Dysarthria 0-->Normal   11. Extinction and Inattention  0-->No abnormality   Total 3 (01/31/25 1435)            Labs     CBC  Lab Results   Component Value Date    HGB 13.4 01/31/2025    HCT 41.4 01/31/2025    WBC 9.7 01/31/2025     01/31/2025       BMP  Lab Results   Component Value Date     01/31/2025    POTASSIUM 4.4 01/31/2025    CHLORIDE 108 (H) 01/31/2025    CO2 23 01/31/2025    BUN 15.7 01/31/2025    CR 1.08 (H) 01/31/2025    CR 1.0 01/31/2025     (H) 01/31/2025    NATY 10.7 (H) 01/31/2025       INR  INR   Date Value Ref Range Status   01/31/2025 0.96 0.85 - 1.15 Final     INR POCT   Date Value Ref Range Status   01/31/2025 1.0 0.9 - 1.3 Final       Data   Stroke Code Data  (for stroke code without tele)  Stroke code activated 01/31/25  1422   First stroke provider response 01/31/25  1426   Last known normal 01/31/25  1345   Time of discovery (or onset of symptoms) 01/31/25  1345   Head CT read by Stroke Neuro Provider 01/31/25  1437   Was stroke code de-escalated? Yes  01/31/25  1501         # Hyperchloremia: Highest Cl = 108 mmol/L in last 2  days, will monitor as appropriate       # Hypercalcemia: Highest Ca = 10.7 mg/dL in last 2 days, will monitor as appropriate      # Drug Induced Platelet Defect: home medication list includes an antiplatelet medication   # Hypertension: Noted on problem list                      Time Spent on this Encounter   Billing: I personally examined and evaluated the patient today. At the time of my evaluation and management the patient was critical condition today due to acute stroke vs TIA. Key decisions made today included thrombolysis eligibility. I spent a total of 60 minutes providing critical care services, evaluating the patient, directing care and reviewing laboratory values and radiologic reports.

## 2025-01-31 NOTE — PHARMACY-CONSULT NOTE
Pharmacy Stroke Code Response    Pharmacist responded as part of the Stroke Code Team activation to patient care area ED2. The Stroke Team determined that the patient was not a candidate for IV thrombolytic therapy and the pharmacy team was dismissed at 1449.    Oscar Pedroza PharmD, BCPS

## 2025-02-01 ENCOUNTER — APPOINTMENT (OUTPATIENT)
Dept: CARDIOLOGY | Facility: CLINIC | Age: 60
End: 2025-02-01
Attending: STUDENT IN AN ORGANIZED HEALTH CARE EDUCATION/TRAINING PROGRAM
Payer: COMMERCIAL

## 2025-02-01 VITALS
RESPIRATION RATE: 14 BRPM | DIASTOLIC BLOOD PRESSURE: 82 MMHG | OXYGEN SATURATION: 98 % | HEART RATE: 75 BPM | BODY MASS INDEX: 35.85 KG/M2 | HEIGHT: 64 IN | SYSTOLIC BLOOD PRESSURE: 145 MMHG | WEIGHT: 210 LBS | TEMPERATURE: 99.4 F

## 2025-02-01 LAB
ANION GAP SERPL CALCULATED.3IONS-SCNC: 9 MMOL/L (ref 7–15)
ATRIAL RATE - MUSE: 74 BPM
BUN SERPL-MCNC: 13.9 MG/DL (ref 8–23)
CALCIUM SERPL-MCNC: 9 MG/DL (ref 8.8–10.4)
CHLORIDE SERPL-SCNC: 111 MMOL/L (ref 98–107)
CREAT SERPL-MCNC: 0.94 MG/DL (ref 0.51–0.95)
DIASTOLIC BLOOD PRESSURE - MUSE: NORMAL MMHG
EGFRCR SERPLBLD CKD-EPI 2021: 69 ML/MIN/1.73M2
ERYTHROCYTE [DISTWIDTH] IN BLOOD BY AUTOMATED COUNT: 13.8 % (ref 10–15)
GLUCOSE SERPL-MCNC: 105 MG/DL (ref 70–99)
HCO3 SERPL-SCNC: 21 MMOL/L (ref 22–29)
HCT VFR BLD AUTO: 39.5 % (ref 35–47)
HGB BLD-MCNC: 12.8 G/DL (ref 11.7–15.7)
INTERPRETATION ECG - MUSE: NORMAL
LVEF ECHO: NORMAL
MAGNESIUM SERPL-MCNC: 2.1 MG/DL (ref 1.7–2.3)
MCH RBC QN AUTO: 25.6 PG (ref 26.5–33)
MCHC RBC AUTO-ENTMCNC: 32.4 G/DL (ref 31.5–36.5)
MCV RBC AUTO: 79 FL (ref 78–100)
P AXIS - MUSE: 25 DEGREES
PHOSPHATE SERPL-MCNC: 3.7 MG/DL (ref 2.5–4.5)
PLATELET # BLD AUTO: 384 10E3/UL (ref 150–450)
POTASSIUM SERPL-SCNC: 4 MMOL/L (ref 3.4–5.3)
PR INTERVAL - MUSE: 214 MS
QRS DURATION - MUSE: 58 MS
QT - MUSE: 384 MS
QTC - MUSE: 426 MS
R AXIS - MUSE: -21 DEGREES
RBC # BLD AUTO: 5 10E6/UL (ref 3.8–5.2)
SODIUM SERPL-SCNC: 141 MMOL/L (ref 135–145)
SYSTOLIC BLOOD PRESSURE - MUSE: NORMAL MMHG
T AXIS - MUSE: 7 DEGREES
VENTRICULAR RATE- MUSE: 74 BPM
WBC # BLD AUTO: 8.5 10E3/UL (ref 4–11)

## 2025-02-01 PROCEDURE — 85014 HEMATOCRIT: CPT | Performed by: STUDENT IN AN ORGANIZED HEALTH CARE EDUCATION/TRAINING PROGRAM

## 2025-02-01 PROCEDURE — 250N000013 HC RX MED GY IP 250 OP 250 PS 637

## 2025-02-01 PROCEDURE — 82435 ASSAY OF BLOOD CHLORIDE: CPT | Performed by: STUDENT IN AN ORGANIZED HEALTH CARE EDUCATION/TRAINING PROGRAM

## 2025-02-01 PROCEDURE — 80048 BASIC METABOLIC PNL TOTAL CA: CPT | Performed by: STUDENT IN AN ORGANIZED HEALTH CARE EDUCATION/TRAINING PROGRAM

## 2025-02-01 PROCEDURE — 999N000147 HC STATISTIC PT IP EVAL DEFER

## 2025-02-01 PROCEDURE — 250N000013 HC RX MED GY IP 250 OP 250 PS 637: Performed by: STUDENT IN AN ORGANIZED HEALTH CARE EDUCATION/TRAINING PROGRAM

## 2025-02-01 PROCEDURE — 84100 ASSAY OF PHOSPHORUS: CPT | Performed by: STUDENT IN AN ORGANIZED HEALTH CARE EDUCATION/TRAINING PROGRAM

## 2025-02-01 PROCEDURE — 999N000111 HC STATISTIC OT IP EVAL DEFER

## 2025-02-01 PROCEDURE — 999N000208 ECHOCARDIOGRAM COMPLETE

## 2025-02-01 PROCEDURE — 250N000011 HC RX IP 250 OP 636: Performed by: STUDENT IN AN ORGANIZED HEALTH CARE EDUCATION/TRAINING PROGRAM

## 2025-02-01 PROCEDURE — 84295 ASSAY OF SERUM SODIUM: CPT | Performed by: STUDENT IN AN ORGANIZED HEALTH CARE EDUCATION/TRAINING PROGRAM

## 2025-02-01 PROCEDURE — 85041 AUTOMATED RBC COUNT: CPT | Performed by: STUDENT IN AN ORGANIZED HEALTH CARE EDUCATION/TRAINING PROGRAM

## 2025-02-01 PROCEDURE — 36415 COLL VENOUS BLD VENIPUNCTURE: CPT | Performed by: STUDENT IN AN ORGANIZED HEALTH CARE EDUCATION/TRAINING PROGRAM

## 2025-02-01 PROCEDURE — 99239 HOSP IP/OBS DSCHRG MGMT >30: CPT | Mod: GC | Performed by: STUDENT IN AN ORGANIZED HEALTH CARE EDUCATION/TRAINING PROGRAM

## 2025-02-01 PROCEDURE — 93306 TTE W/DOPPLER COMPLETE: CPT | Mod: 26 | Performed by: INTERNAL MEDICINE

## 2025-02-01 PROCEDURE — 83735 ASSAY OF MAGNESIUM: CPT | Performed by: STUDENT IN AN ORGANIZED HEALTH CARE EDUCATION/TRAINING PROGRAM

## 2025-02-01 RX ORDER — NICOTINE 21 MG/24HR
1 PATCH, TRANSDERMAL 24 HOURS TRANSDERMAL DAILY
Status: DISCONTINUED | OUTPATIENT
Start: 2025-02-01 | End: 2025-02-01 | Stop reason: HOSPADM

## 2025-02-01 RX ORDER — ATORVASTATIN CALCIUM 80 MG/1
80 TABLET, FILM COATED ORAL DAILY
Qty: 90 TABLET | Refills: 3 | Status: SHIPPED | OUTPATIENT
Start: 2025-02-01

## 2025-02-01 RX ORDER — NICOTINE 21 MG/24HR
1 PATCH, TRANSDERMAL 24 HOURS TRANSDERMAL EVERY 24 HOURS
Qty: 28 PATCH | Refills: 1 | Status: SHIPPED | OUTPATIENT
Start: 2025-02-01

## 2025-02-01 RX ORDER — CARVEDILOL 25 MG/1
12.5 TABLET ORAL DAILY
Qty: 180 TABLET | Refills: 3 | Status: SHIPPED | OUTPATIENT
Start: 2025-02-01

## 2025-02-01 RX ORDER — CLOPIDOGREL BISULFATE 75 MG/1
75 TABLET ORAL DAILY
Status: DISCONTINUED | OUTPATIENT
Start: 2025-02-01 | End: 2025-02-01 | Stop reason: HOSPADM

## 2025-02-01 RX ORDER — ASPIRIN 81 MG/1
81 TABLET ORAL DAILY
Qty: 90 TABLET | Refills: 3 | Status: SHIPPED | OUTPATIENT
Start: 2025-02-01

## 2025-02-01 RX ORDER — EZETIMIBE 10 MG/1
10 TABLET ORAL DAILY
Qty: 90 TABLET | Refills: 3 | Status: SHIPPED | OUTPATIENT
Start: 2025-02-01

## 2025-02-01 RX ORDER — CLOPIDOGREL BISULFATE 75 MG/1
75 TABLET ORAL DAILY
Qty: 21 TABLET | Refills: 0 | Status: SHIPPED | OUTPATIENT
Start: 2025-02-02

## 2025-02-01 RX ADMIN — CLOPIDOGREL BISULFATE 75 MG: 75 TABLET ORAL at 07:45

## 2025-02-01 RX ADMIN — ESCITALOPRAM OXALATE 10 MG: 10 TABLET ORAL at 07:45

## 2025-02-01 RX ADMIN — ENOXAPARIN SODIUM 40 MG: 40 INJECTION SUBCUTANEOUS at 10:13

## 2025-02-01 RX ADMIN — ASPIRIN 81 MG CHEWABLE TABLET 81 MG: 81 TABLET CHEWABLE at 07:45

## 2025-02-01 RX ADMIN — EZETIMIBE 10 MG: 10 TABLET ORAL at 07:45

## 2025-02-01 ASSESSMENT — ACTIVITIES OF DAILY LIVING (ADL)
ADLS_ACUITY_SCORE: 64
ADLS_ACUITY_SCORE: 61
ADLS_ACUITY_SCORE: 64
ADLS_ACUITY_SCORE: 58
ADLS_ACUITY_SCORE: 64
ADLS_ACUITY_SCORE: 64

## 2025-02-01 ASSESSMENT — PATIENT HEALTH QUESTIONNAIRE - PHQ9: SUM OF ALL RESPONSES TO PHQ QUESTIONS 1-9: 0

## 2025-02-01 NOTE — PLAN OF CARE
Blood pressure (!) 154/76, pulse 65, temperature 97.8  F (36.6  C), temperature source Oral, resp. rate 18, weight 96.5 kg (212 lb 11.9 oz), SpO2 99%.     Neuro: A&Ox4. Neuros Q4, neuros unchanged throughout shift.   Cardiac: SR. VSS. Tele.   Respiratory: Sating 99 on RA. Continuous pulse ox.   GI/: Adequate urine output. Urinary frequency and incontinence, pull-ups on with marcello underneath.   Diet/appetite: Tolerating regular diet. Swallows well. Monitor blood sugars.  Activity: SBA, put not out of bed during shift. Bed alarm on.   Pain: At acceptable level on current regimen. Denies pain.   Skin: No new deficits noted.  LDA's: R and L PIV SL.     Plan: Continue with POC. Notify primary team with changes.    Goal Outcome Evaluation:      Plan of Care Reviewed With: patient, spouse    Overall Patient Progress: improvingOverall Patient Progress: improving

## 2025-02-01 NOTE — DISCHARGE SUMMARY
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 Symptoms resolved on arrival. Chronic left PCA stroke, right basal ganglia stroke and chronic left PCA steno-occlusive disease noted on imaging.     The patient did have fluctuation in symptoms throughout the evening after admission.  Her fluctuations involve the speech difficulty, right arm weakness, and right leg weakness.  However, these had inconsistencies on exam and she is able to be coached through many of these deficits.  We did have minor concern for a functional neurologic component to these deficits.  However, given her significant risk factors, we opted to treat her for TIA.  MRI brain did show subacute infarcts versus T2 shine through on diffusion restriction.  Given her high risk TIA, treatment would be the same, and she was started on DAPT for 21 days.    Had an extensive discussion with her about her stroke risk factors.  I tried to express the urgency of taking aspirin, Plavix, atorvastatin, Zetia, and blood pressure medications.  I also stressed the importance of quitting smoking.  Throughout our extensive discussion, I believe she started to understand the importance of these medications.  I recommend continued follow-up and education at future provider visits.  -Aspirin 81mg  daily  -Plavix 75mg for 21 days  -Atorvastatin 80mg  -Zetia 10mg  -Carvedilol 12.5mg daily  -Smoking cessation (prescribed nicotine patches)  -Zio patch on discharge  -PTA hydroxyzine, trazodone  -Reported poor compliance with medications prior to admission. Reported no longer taking SSRI, injectable medications.   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      LKW 1:45 PM when she had sit down to watch TV with her friend. Ten minutes later, she got up from chair and she states that her right leg gave out. Her friend states that she had a controlled fall to the ground and fell on her butt. He sat her down and she was noted to have right arm weakness and slurry speech. Her friend called EMS and her symptoms resolved by the time of their arrival. Right sided weakness and speech difficulties recurred en route to the hospital.      On my initial exam, she had stuttering speech and could speak/repeat words correctly with coaching. She did not spontaneously lift her right arm up on command. When I lifted her arm up and with coaching, she was able to keep her arm up for 10 seconds without drift. Initial NIHSS 3 for mild loss of fluency and chronic right hemianopia. After completing CT scans, her exam returned to baseline with clear/fluent speech and strength 5/5 on all extremities.      Patient states that she had right sided weakness with her prior PCA stroke (? 2010 based on notes, no records/images available in system). Her motor deficits resolved leaving her with right hemianopia and she does not drive.     Please see H&P dated 1/31/2025  for further details about presentation.    Brief Hospital Course:   Patient presented with expressive aphasia and right hemibody weakness which resolved/flucuated.      Found to have questionable subacute infarcts vs T2 shinethrough and extensive white matter disease .      IV thrombolysis was not indicated due to quickly resolving symptoms.      Work-up as stated below under Pertinent Investigations.    Etiology is thought to be small vessel disease.      Rehab evaluation: No rehab services required due to lack of ongoing deficit.     Smoking Cessation: education provided, support referral ordered    BP Long-term Goal: 140/90 or less    Antithrombotic/Anticoagulant Agent: aspirin 81 mg lifelong and clopidogrel (Plavix) 75 mg for 21 days    Statins: Restarted on Atorvastatin 80mg and Zetia 10mg (previously not taking). LDL goal < 70    Hgb A1C Goal: < 7.0    Complications: None.     Other problems addressed during this hospitalization:  Plan  -Aspirin 81mg daily  -Plavix 75mg for 21 days  -Atorvastatin 80mg  -Zetia 10mg  -Carvedilol 12.5mg daily  -Smoking cessation (prescribed nicotine patches)  -Zio patch on discharge  -PTA hydroxyzine, trazodone  -Reported poor compliance with medications prior to admission. Reported no longer taking SSRI, injectable medications.   -Follow up with PCP in 1 week  -Tobacco referral  -Follow up with Neurology in 6-8 weeks    PERTINENT INVESTIGATIONS    Labs  Lipid Panel:   Recent Labs   Lab Test 01/31/25  1427   CHOL 268*   HDL 56   *   TRIG 161*     A1C:   Lab Results   Component Value Date    A1C 5.9 01/31/2025    A1C 6.6 04/26/2021     INR:   Recent Labs   Lab 01/31/25  1427   INR 0.96  1.0      Coag Panel / Hypercoag Workup: Not indicated  Pending test results: none    Stroke Evaluation Summarized    MRI/Head CT IMPRESSION:  1. Subacute infarct in the medial right cerebellum. Tiny punctate  restricted diffusion near the left parieto-occipital encephalomalacia  likely  represents acute infarct.  2. Extensive encephalomalacia present in the bilateral frontal white  matter, right anterior limb of the internal capsule, bilateral basal  ganglia, brainstem and keith, likely sequela of prior infarcts.   Intracranial Vasculature Impression:    1. Head CTA demonstrates left posterior cerebral artery proximal  stenosis with paucity of distal branches in the region of  parieto-occipital encephalomalacia, presumed chronic. No acute large  vessel occlusion. No aneurysm of the intracranial vasculature.  2. Neck CTA demonstrates no stenosis of the major cervical arteries.  3. Right thyroid nodule. Consider nonemergent outpatient ultrasound  for additional characterization.   Cervical Vasculature above     Echocardiogram Interpretation Summary  Global and regional left ventricular function is normal with an EF of 60-65%.  Global right ventricular function is normal.  The atrial septum is intact as assessed by agitated saline bubble study .  The inferior vena cava was normal in size with preserved respiratory  variability.  This study was compared with the study from 12/20/24 .  No significant changes noted.   EKG/Telemetry Sinus rhythm with 1st degree A-V block   Minimal voltage criteria for LVH, may be normal variant ( R in aVL )   Inferior infarct , age undetermined   Anterior infarct , age undetermined   Abnormal ECG   Unconfirmed report - interpretation of this ECG is computer generated - see medical record for final interpretation    Other Testing Not Applicable      LDL  1/31/2025: 180 mg/dL   A1C  11/26/2024: 6.2 %  1/31/2025: 5.9 %   Troponin 1/31/2025: 11 ng/L          Endovascular procedure: None     Cardiac Monitoring: Patient had > 24 hrs of cardiac monitor while in hospital.    Findings: No atrial fibrillation was found. and Patient discharged with 15 day home cardiac monitor    Sleep Apnea Screen:   Patient already following with sleep medicine    PHQ-9 Depression Screen Score:  0    Education discussed with: patient on blood pressure management, cholesterol management, medical management, smoking cessation plan, diet modification, exercise recommendation, follow-up recommendations/plan, and 911 call if warning signs of stroke.    PHYSICAL EXAMINATION  Vital Signs:  B/P: 154/76, T: 97.8, P: 65, R: 18    General:  patient lying in bed without any acute distress     HEENT:  normocephalic/atraumatic  Cardio:  RRR  Pulmonary:  no respiratory distress  Abdomen:  soft  Extremities:  no edema  Skin:  intact     Neurologic  Mental Status:  fully alert, attentive and oriented, follows commands, speech clear and fluent  Cranial Nerves:  Significant R homonomous hemianopia, PERRL, EOMI with normal smooth pursuit, facial sensation intact and symmetric, facial movements symmetric, hearing not formally tested but intact to conversation, no dysarthria, shoulder shrug strong bilaterally, tongue protrusion midline  Motor:  no pronator drift, able to move all limbs spontaneously, strength 5/5 throughout upper and lower extremities  Reflexes:  No clonus  Sensory:  intact/symmetric to light touch and pin prick throughout upper and lower extremities, no extinction   Coordination:  FNF and HS intact without dysmetria  Station/Gait:  normal width, stride length, turn, and arm swing     National Institutes of Health Stroke Scale (on day of discharge)  NIHSS Total Score: 2    Modified Nidhi Score (Discharge)  2-Slight disability; unable to carry out all previous activities, but able to look after own affairs    Medications    Discharge Medication List as of 2/1/2025 12:40 PM        START taking these medications    Details   clopidogrel (PLAVIX) 75 MG tablet Take 1 tablet (75 mg) by mouth daily. Take 1 per day until you run out. Then take no more., Disp-21 tablet, R-0, E-Prescribe      nicotine (NICODERM CQ) 14 MG/24HR 24 hr patch Place 1 patch over 24 hours onto the skin every 24 hours.Disp-28 patch,  [id number]           CONTINUE these medications which have CHANGED    Details   aspirin 81 MG EC tablet Take 1 tablet (81 mg) by mouth daily. Take one daily for stroke prevention., Disp-90 tablet, R-3, E-Prescribe      atorvastatin (LIPITOR) 80 MG tablet Take 1 tablet (80 mg) by mouth daily. Take one daily for cholesterol, Disp-90 tablet, R-3, E-Prescribe      carvedilol (COREG) 25 MG tablet Take 0.5 tablets (12.5 mg) by mouth daily., Disp-180 tablet, R-3, E-Prescribe      ezetimibe (ZETIA) 10 MG tablet Take 1 tablet (10 mg) by mouth daily. Take one daily for cholesterol, Disp-90 tablet, R-3, E-Prescribe           CONTINUE these medications which have NOT CHANGED    Details   blood glucose (NO BRAND SPECIFIED) lancets standard Use to test blood sugar 3 times daily or as directed.Disp-100 each, [id number]      !! blood glucose (NO BRAND SPECIFIED) test strip Use to test blood sugar 3 times daily or as directed. To accompany: Blood Glucose Monitor Brands: per insurance., Disp-300 strip, R-3, E-Prescribe      !! blood glucose (NO BRAND SPECIFIED) test strip Use to test blood sugar 3  times daily or as directed., Disp-100 strip, R-0, E-Prescribe      blood glucose monitoring (NO BRAND SPECIFIED) meter device kit Use to test blood sugar  3 times times daily or as directed.Disp-1 kit, [id number]      blood glucose monitoring (SOFTCLIX) lancets USE TO TEST BLOOD SUGAR 3 TIMES DAILY OR AS DIRECTED., Historical      Blood Glucose Monitoring Suppl (FIFTY50 GLUCOSE METER 2.0) w/Device KIT Check blood sugar  twice a day, Disp-1 kit, R-0, E-Prescribe      hydrOXYzine HCl (ATARAX) 25 MG tablet TAKE 1 TABLET(25 MG) BY MOUTH EVERY NIGHT AS NEEDED FOR ANXIETY, Disp-30 tablet, R-3, E-Prescribe      melatonin 3 MG tablet Take 1 tablet (3 mg) by mouth nightly as needed for sleep, Disp-90 tablet, R-0, E-Prescribe      nicotine (COMMIT) 2 MG lozenge Place 1 lozenge (2 mg) inside cheek every hour as needed for nicotine  withdrawal symptoms., Disp-27 lozenge, R-3, E-Prescribe      traZODone (DESYREL) 50 MG tablet Take  mg by mouth, Historical       !! - Potential duplicate medications found. Please discuss with provider.        STOP taking these medications       bisacodyl (DULCOLAX) 5 MG EC tablet Comments:   Reason for Stopping:         diclofenac (VOLTAREN) 1 % topical gel Comments:   Reason for Stopping:         escitalopram (LEXAPRO) 10 MG tablet Comments:   Reason for Stopping:         exenatide ER (BYDUREON BCISE) 2 MG/0.85ML auto-injector Comments:   Reason for Stopping:         famotidine (PEPCID) 20 MG tablet Comments:   Reason for Stopping:         fluocinolone acetonide (DERMA SMOOTHE/FS BODY) 0.01 % external oil Comments:   Reason for Stopping:         Fluocinolone Acetonide Scalp (DERMA-SMOOTHE/FS SCALP) 0.01 % OIL oil Comments:   Reason for Stopping:         vitamin D3 25 mcg (1000 units) tablet Comments:   Reason for Stopping:               Additional recommendations and follow up:       Quit Partner (Tobacco Cessation) Referral      Reason for your hospital stay    You were admitted for stroke like symptoms, causing speech changes, right arm, and right leg weakness. Thankfully, they improved. We saw a small new stroke on your MRI, and saw evidence of lots of old strokes that we looked at together (all of that white area in the brain). As we discussed, these were caused by a combination of high blood pressure, high cholesterol, diabetes, and smoking. We want to help prevent these from getting worse, and want to make sure we have you on the right medications. I listed out which ones you need to be on here:    Aspirin 81mg: Take once per day. This is to help prevent strokes. Of all your medications, this is most important, and I want you to take one per day for the rest of your life.    Plavix (clopidogrel) 75mg: Take once per day for 21 days, then stop. We know that the risk of having another stroke is highest  in the few weeks after a stroke. We give this medication to help lower that risk in the short term.    Lipitor (atorvastatin) 80mg: Take once per day for cholesterol. As we mentioned, your cholesterol is more than twice as high as we would like. This medication will be important to help lower your bad cholesterol. Avoiding butter, greasy food, and red meat can help lower your cholesterol as well.     Zetia (ezetimibe) 10mg: Take once per day for cholesterol. This is another medication to help lower that bad cholesterol. We want you on two medications because yours is very high.     I prescribed you nicotine patches to help you quit smoking, and you can keep taking your hydroxizine and trazodone for anxiety and sleep as needed.    The most common side effects of your medications would include lightheadedness, muscle aches, general fatigue, or mild bruising. If you are experiencing these symptoms, I would prefer you go to your primary care doctor to talk about it before stopping your medications.    -Dr. Koko Crain     Activity    Your activity upon discharge: activity as tolerated     Adult [facility] Follow-up and recommended labs and tests    Call and make an appointment with your primary care provider, Aminata Trujillo, within 7 days to evaluate medication change and for hospital follow- up.      Our Neurology clinic will call to make an appointment with you in 6-8 weeks.       Appointments on [place] and/or [facility] (with [facility] or [facility] provider or service). Call [phone] if you haven't heard regarding these appointments within 7 days of discharge.     Diet    Follow this diet upon discharge: Current Diet:Orders Placed This Encounter      Regular Diet Adult     Stroke Hospital Follow Up (for neurologist use only)    [facility] will call you to coordinate care as prescribed by your provider. If you don t hear from a representative within 2 business days, please call [phone].       ZIO PATCH  MAIL OUT       Patient was seen and discussed with the Attending, Dr. Hall.    Koko Crain MD  Neurology Resident PGY-2

## 2025-02-01 NOTE — PLAN OF CARE
Physical Therapy defer - Orders received, chart reviewed, discussed with care team. No acute PT needs indicated at this time. Per OT, patient has returned to baseline, no residual deficits from TIA. Will complete consult and defer evaluation, please reconsult as appropriate if patient has decline in functional mobility requiring further skilled inpatient PT needs. Defer Discharge recommendations to OT/medical team.

## 2025-02-01 NOTE — PROGRESS NOTES
Neuro: A&Ox4. Neuros intact. Afebrile this shift.   Cardiac: SR intermittent bradycardia. VSS. Denies Chest pain. Cardiac monitoring in place.   Respiratory: Sating 99% on RA. Denies SOB  GI/: Adequate urine output, urgently needs to go. Incontinent x2, brief on.    Diet/appetite: Tolerating regular diet. Eating well. Swallow eval passed.   Activity:  standby assist to bathroom. Bed alarm on.   Pain: At acceptable level on current regimen.   Skin: No new deficits noted.  LDA's: PIV saline locked, CDI.     Plan:   -Neuro checks q4.   -monitor blood sugars.     Continue with POC. Notify primary team with changes.

## 2025-02-01 NOTE — PLAN OF CARE
Occupational Therapy: Orders received. Chart reviewed and talked with pt. Pt observed to be up IND in room, BUE/BLE strength 5/5 and pt endorsing no new deficits.? Acute Occupational Therapy not indicated due to pt at functional baseline, anticipate pt can discharge to previous disposition when medically ready.? Defer discharge recommendations to medical team.? Will complete orders. Please re-order OT if pt has change in functional status.

## 2025-02-01 NOTE — PLAN OF CARE
Speech Language Pathology: Orders received. Chart reviewed and discussed with care team.? Speech Language Pathology not indicated as pt has returned to baseline functioning.? Per RN, no concerns for swallowing, language or dysarthria. Defer discharge recommendations to medical care team.? Will complete orders.

## 2025-02-01 NOTE — PHARMACY-CONSULT NOTE
Pharmacy Consult to evaluate for medication related stroke core measures    Rimma Sarmiento, 59 year old female admitted for TIA 2/2 chronic L PCA near occlusion  on 1/31/2025.    Thrombolytic was not given because of minor and resolving symptoms    VTE Prophylaxis Enoxaparin given on 2/1/25 as appropriate prior to end of hospital day 2.    Antithrombotic: aspirin and clopidogrel started on 1/31/25, as appropriate by end of hospital day 2. Continue antithrombotic therapy on discharge to meet quality measures, unless contraindicated.    Anticoagulation if history of A-fib/flutter: Patient does not have history of A-fib/flutter - anticoagulation not required for medication related stroke core measures.     LDL Cholesterol Calculated   Date Value Ref Range Status   01/31/2025 180 (H) <100 mg/dL Final   09/09/2020 73 <100 mg/dL Final     Comment:     Desirable:       <100 mg/dl       Patient's home statin, Lipitor (atorvastatin) restarted; continue statin on discharge to meet quality measures, unless contraindicated.     Recommendations: None at this time    Thank you for the consult.    Kina James RPH 2/1/2025 11:52 AM

## 2025-02-01 NOTE — PROGRESS NOTES
Discharge instructions reviewed with patient. All questions/concerns regarding AVS addressed and patient acknowledged understanding. PIV removed and all personal belongings secured with patient. 6 medications currently ready at discharge pharmacy for pt to . Patient discharging hospital via personal vehicle .

## 2025-02-02 ENCOUNTER — ORDERS ONLY (AUTO-RELEASED) (OUTPATIENT)
Dept: EMERGENCY MEDICINE | Facility: CLINIC | Age: 60
End: 2025-02-02
Payer: COMMERCIAL

## 2025-02-02 DIAGNOSIS — F41.9 ANXIETY: ICD-10-CM

## 2025-02-02 DIAGNOSIS — I63.433 CEREBROVASCULAR ACCIDENT (CVA) DUE TO BILATERAL EMBOLISM OF POSTERIOR CEREBRAL ARTERIES (H): ICD-10-CM

## 2025-02-03 RX ORDER — HYDROXYZINE HYDROCHLORIDE 25 MG/1
TABLET, FILM COATED ORAL
Qty: 30 TABLET | Refills: 2 | Status: SHIPPED | OUTPATIENT
Start: 2025-02-03

## 2025-02-20 ENCOUNTER — TRANSFERRED RECORDS (OUTPATIENT)
Dept: HEALTH INFORMATION MANAGEMENT | Facility: CLINIC | Age: 60
End: 2025-02-20
Payer: COMMERCIAL

## 2025-03-03 ENCOUNTER — TELEPHONE (OUTPATIENT)
Dept: FAMILY MEDICINE | Facility: CLINIC | Age: 60
End: 2025-03-03
Payer: COMMERCIAL

## 2025-03-03 NOTE — TELEPHONE ENCOUNTER
Called pt I was able to get a hold of her and get her scheduled on 3/19        ----- Message from Aminata Trujillo sent at 3/3/2025  2:12 PM CST -----  Please call to schedule a hospital discharge follow up.    Thank you    Aminata Trujillo, CNP

## 2025-03-19 ASSESSMENT — ANXIETY QUESTIONNAIRES
1. FEELING NERVOUS, ANXIOUS, OR ON EDGE: SEVERAL DAYS
IF YOU CHECKED OFF ANY PROBLEMS ON THIS QUESTIONNAIRE, HOW DIFFICULT HAVE THESE PROBLEMS MADE IT FOR YOU TO DO YOUR WORK, TAKE CARE OF THINGS AT HOME, OR GET ALONG WITH OTHER PEOPLE: EXTREMELY DIFFICULT
GAD7 TOTAL SCORE: 19
GAD7 TOTAL SCORE: 19
5. BEING SO RESTLESS THAT IT IS HARD TO SIT STILL: NEARLY EVERY DAY
4. TROUBLE RELAXING: NEARLY EVERY DAY
2. NOT BEING ABLE TO STOP OR CONTROL WORRYING: NEARLY EVERY DAY
GAD7 TOTAL SCORE: 19
7. FEELING AFRAID AS IF SOMETHING AWFUL MIGHT HAPPEN: NEARLY EVERY DAY
7. FEELING AFRAID AS IF SOMETHING AWFUL MIGHT HAPPEN: NEARLY EVERY DAY
3. WORRYING TOO MUCH ABOUT DIFFERENT THINGS: NEARLY EVERY DAY
6. BECOMING EASILY ANNOYED OR IRRITABLE: NEARLY EVERY DAY
8. IF YOU CHECKED OFF ANY PROBLEMS, HOW DIFFICULT HAVE THESE MADE IT FOR YOU TO DO YOUR WORK, TAKE CARE OF THINGS AT HOME, OR GET ALONG WITH OTHER PEOPLE?: EXTREMELY DIFFICULT

## 2025-03-19 ASSESSMENT — PATIENT HEALTH QUESTIONNAIRE - PHQ9
SUM OF ALL RESPONSES TO PHQ QUESTIONS 1-9: 17
SUM OF ALL RESPONSES TO PHQ QUESTIONS 1-9: 17
10. IF YOU CHECKED OFF ANY PROBLEMS, HOW DIFFICULT HAVE THESE PROBLEMS MADE IT FOR YOU TO DO YOUR WORK, TAKE CARE OF THINGS AT HOME, OR GET ALONG WITH OTHER PEOPLE: VERY DIFFICULT

## 2025-03-20 NOTE — PROGRESS NOTES
Virtual Visit Details    Type of service:  Video Visit   Video Start Time: 2:00 PM  Video End Time:2:27 PM    Originating Location (pt. Location): Home    Distant Location (provider location):  Off-site  Platform used for Video Visit: Iker    __________________________________________________________      [facility] [place]   [phone]  __________________________________________________________           History of Present Illness   Chief Complaint: Patient presents with:  Stroke      Rimma Sarmiento is a 59 year old female presenting for follow-up for possible TIA.     She was hospitalized at [facility] on 1/31/25. 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 She presented to the hospital with fluctuating right sided weakness and speech difficulties. There was question of possible subacute infarcts vs T2 shine through in the R cerebellum and L pareto-occipital regions, although no clearly acute findings that correlated with presenting symptoms. Given numerous vascular risk factors/high risk of stroke it was decided to treat as TIA with DAPT x21 days, Lipitor with recommendation for tobacco cessation.    Today, Rimma reports that she has had no recurrent episodes of right sided weakness or speech difficulties.  She reports that she has a high level of concern that she is going to have another stroke again.    She is having significant difficulty with medication management.  She reports that she is not taking most of her medications because she is not sure what they are for.  As we talked through her medications she has to walk from room to room to find them, they are stored in multiple different places.  She does not have 1 central location for medications nor a medication pillbox.  She does  report that she is taking aspirin every day and believes that she is no longer on Plavix.  She has not been taking atorvastatin and cannot find a medication bottle for Zetia so she does not think she has this at all.  She believes she is taking carvedilol but is not aware of any other medications for blood pressure or heart.  She reports that she is out of her diabetes medications.  She does not have a glucose monitor so she has not been checking glucose at home.    She does report that she has been able to cut down on tobacco use.  She was previously smoking approximately 9 cigarettes/day, and has dropped this down to 4.    She has not yet completed the Zio patch, but does have it at home with her.    She is scheduled to establish care with a new primary care provider tomorrow.    Modified Nidhi Scale  Score: 2-Slight disability; unable to carry out all previous activities, but able to look after own affairs    Stroke Evaluation Summarized:  New results resulted and reviewed by me today are in BOLD below.     MRI/Head CT IMPRESSION:  1. Subacute infarct in the medial right cerebellum. Tiny punctate  restricted diffusion near the left parieto-occipital encephalomalacia  likely represents acute infarct.  2. Extensive encephalomalacia present in the bilateral frontal white  matter, right anterior limb of the internal capsule, bilateral basal  ganglia, brainstem and keith, likely sequela of prior infarcts.   Intracranial Vasculature Impression:    1. Head CTA demonstrates left posterior cerebral artery proximal  stenosis with paucity of distal branches in the region of  parieto-occipital encephalomalacia, presumed chronic. No acute large  vessel occlusion. No aneurysm of the intracranial vasculature.  2. Neck CTA demonstrates no stenosis of the major cervical arteries.  3. Right thyroid nodule. Consider nonemergent outpatient ultrasound  for additional characterization.   Cervical Vasculature above      Echocardiogram  Interpretation Summary  Global and regional left ventricular function is normal with an EF of 60-65%.  Global right ventricular function is normal.  The atrial septum is intact as assessed by agitated saline bubble study .  The inferior vena cava was normal in size with preserved respiratory  variability.  This study was compared with the study from 12/20/24 .  No significant changes noted.   EKG/Telemetry Sinus rhythm with 1st degree A-V block   Minimal voltage criteria for LVH, may be normal variant ( R in aVL )   Inferior infarct , age undetermined   Anterior infarct , age undetermined   Abnormal ECG   Unconfirmed report - interpretation of this ECG is computer generated - see medical record for final interpretation     Cardiac event monitor: pending   Other Testing Not Applicable      Labs Lab Results   Component Value Date     (H) 01/31/2025    A1C 5.9 (H) 01/31/2025    CTROPT 11 01/31/2025    INR 0.96 01/31/2025    INR 1.0 01/31/2025                 Home Medications     Current Outpatient Medications   Medication Sig Dispense Refill    aspirin 81 MG EC tablet Take 1 tablet (81 mg) by mouth daily. Take one daily for stroke prevention. 90 tablet 3    atorvastatin (LIPITOR) 80 MG tablet Take 1 tablet (80 mg) by mouth daily. Take one daily for cholesterol 90 tablet 3    blood glucose (NO BRAND SPECIFIED) lancets standard Use to test blood sugar 3 times daily or as directed. 100 each 0    blood glucose (NO BRAND SPECIFIED) test strip Use to test blood sugar 3 times daily or as directed. To accompany: Blood Glucose Monitor Brands: per insurance. 300 strip 3    blood glucose (NO BRAND SPECIFIED) test strip Use to test blood sugar 3  times daily or as directed. 100 strip 0    blood glucose monitoring (NO BRAND SPECIFIED) meter device kit Use to test blood sugar  3 times times daily or as directed. 1 kit 0    blood glucose monitoring (SOFTCLIX) lancets USE TO TEST BLOOD SUGAR 3 TIMES DAILY OR AS DIRECTED.       "Blood Glucose Monitoring Suppl (FIFTY50 GLUCOSE METER 2.0) w/Device KIT Check blood sugar  twice a day 1 kit 0    carvedilol (COREG) 25 MG tablet Take 0.5 tablets (12.5 mg) by mouth daily. 180 tablet 3    ezetimibe (ZETIA) 10 MG tablet Take 1 tablet (10 mg) by mouth daily. Take one daily for cholesterol 90 tablet 3    hydrOXYzine HCl (ATARAX) 25 MG tablet TAKE 1 TABLET(25 MG) BY MOUTH EVERY NIGHT AS NEEDED FOR ANXIETY 30 tablet 2    melatonin 3 MG tablet Take 1 tablet (3 mg) by mouth nightly as needed for sleep 90 tablet 0    nicotine (COMMIT) 2 MG lozenge Place 1 lozenge (2 mg) inside cheek every hour as needed for nicotine withdrawal symptoms. 27 lozenge 3    nicotine (NICODERM CQ) 14 MG/24HR 24 hr patch Place 1 patch over 24 hours onto the skin every 24 hours. 28 patch 1    traZODone (DESYREL) 50 MG tablet Take  mg by mouth       No current facility-administered medications for this visit.            Physical Examination     Vitals:  Vitals - Patient Reported  Weight (Patient Reported): 94.8 kg (209 lb)  Height (Patient Reported): 161.3 cm (5' 3.5\")  BMI (Based on Pt Reported Ht/Wt): 36.44         BMI Readings from Last 1 Encounters:   25 36.62 kg/m        Neurologic: Completed via telemedicine video call  Mental Status:  alert, oriented x 3, speech clear and fluent  Cranial Nerves:  EOMI with normal smooth pursuit, facial movements symmetric, hearing not formally tested but intact to conversation, no dysarthria, shoulder shrug equal bilaterally, tongue protrusion midline  Motor:  no abnormal movements, able to move all limbs antigravity spontaneously with no signs of hemiparesis observed         Screenings and Questionnaires:     Tobacco:    Tobacco Use      Smoking status: Every Day        Packs/day: 0.00        Types: Cigarettes        Last attempt to quit: 2019        Years since quittin.6      Smokeless tobacco: Never      Tobacco comments: 24-Smokes 3-4 cigarettes per day.       " Has quit multiple times      Sleep Apnea:       Depression:      3/24/2025    12:03 PM 12/5/2024     3:55 PM   PHQ-2 ( 1999 [employer])   Q1: Little interest or pleasure in doing things 1 1   Q2: Feeling down, depressed or hopeless 1 1   PHQ-2 Score 2  2   Q1: Little interest or pleasure in doing things Several days    Q2: Feeling down, depressed or hopeless Several days    PHQ-2 Score 2        Patient-reported       Stroke Recovery and Risk Factors:      3/24/2025    12:08 PM   Stroke Questionnaire   Residual effects: Any residual effects from stroke? I have some symptoms, but I'm not sure if they're residual effects of the stroke   Level of independence: Could you live alone? No   Level of independence: Walking Need some help   Level of independence: Eating Dependent   Level of independence: Stairs Dependent   Level of independence: Dressing Need some help   Level of independence: Bathing Need some help   Level of independence: Toileting Need some help   Current therapy: Physical Therapy No   Current therapy: Occupation Therapy No   Current therapy: Speech Therapy No   Current therapy: Other No   Risk Factor: Checking blood pressure at home No, I don't have a blood pressure cuff   Risk Factor: Checking blood sugar at home Yes   Risk Factor: Usual blood sugar numbers 100 to 150   Risk Factor: Second-hand smoke at home No   Who completed this questionnaire? The patient independently           Assessment and Plan   Transient R sided weakness/speech changes, possible TIA; January 2025    -Continue ASA 81mg daily indefinitely for secondary stroke prevention  -Continue Lipitor; goal LDL 40-70  -Goal blood pressure is <130/80 with tighter control associated with improved vascular outcomes  -A1C goal <7.0%  -tobacco cessation advised  -Mediterranean diet can be beneficial for overall decreased cardiovascular risk; moderate aerobic exercise with goal of 30 minutes/day x 5 days/week   -complete 14 day cardiac event monitor;  plans to put this on tomorrow   -recommend taking all medications in to primary care visit tomorrow to help clarify current medication recommendations       - Return in about 2 months (around 5/24/2025) for TIA, with CURT Hussein only, 60 min.     Stroke Education provided.  She will call us with any questions.  For any acute neurologic deficits she was advised to  go directly to the hospital rather than call the clinic.      JANY Travis [facility]  Neurology  03/24/2025         ____________________________________________________________________    Billing:  Please note: for coding purposes this visit should be billed only as established and not new, since this patient was seen by my same subspecialty team ([facility]) during the hospitalization that this visit is a follow up for.

## 2025-03-24 ENCOUNTER — VIRTUAL VISIT (OUTPATIENT)
Dept: NEUROLOGY | Facility: CLINIC | Age: 60
End: 2025-03-24
Payer: COMMERCIAL

## 2025-03-24 DIAGNOSIS — I63.433 CEREBROVASCULAR ACCIDENT (CVA) DUE TO BILATERAL EMBOLISM OF POSTERIOR CEREBRAL ARTERIES (H): ICD-10-CM

## 2025-03-24 DIAGNOSIS — Z86.73 HISTORY OF STROKE: Primary | ICD-10-CM

## 2025-03-24 PROCEDURE — 98007 SYNCH AUDIO-VIDEO EST HI 40: CPT | Performed by: NURSE PRACTITIONER

## 2025-03-24 NOTE — NURSING NOTE
Current patient location: [address] [place], [place] [address]  [place] [address]    Is the patient currently in the state of [place]? YES    Visit mode:VIDEO    If the visit is dropped, the patient can be reconnected by: VIDEO VISIT: Text to cell phone:   Telephone Information:   Mobile [phone]    and VIDEO VISIT: Send to e-mail at: [email or web address]    Will anyone else be joining the visit? NO  (If patient encounters technical issues they should call [phone]    How would you like to obtain your AVS? MyChart    Are changes needed to the allergy or medication list? No    Are refills needed on medications prescribed by this physician? NO    Reason for visit: Stroke    Arlette Lino MA

## 2025-03-24 NOTE — LETTER
3/24/2025      Rimma Sarmiento  [address]  [place] [address]      Dear Colleague,    Thank you for referring your patient, Rimma Sarmiento, to the [facility] NEUROLOGY [facility]. Please see a copy of my visit note below.    Virtual Visit Details    Type of service:  Video Visit   Video Start Time: 2:00 PM  Video End Time:2:27 PM    Originating Location (pt. Location): Home    Distant Location (provider location):  Off-site  Platform used for Video Visit: SERVICEINFINITY    __________________________________________________________      [facility]   [phone]  __________________________________________________________           History of Present Illness   Chief Complaint: Patient presents with:  Stroke      Rimma Sarmiento is a 59 year old female presenting for follow-up for possible TIA.     She was hospitalized at [facility] on 1/31/25. 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 She presented to the hospital with fluctuating right sided weakness and speech difficulties. There was question of possible subacute infarcts vs T2 shine through in the R cerebellum and L pareto-occipital regions, although no clearly acute findings that correlated with presenting symptoms. Given numerous vascular risk factors/high risk of stroke it was decided to treat as TIA with DAPT x21 days, Lipitor with recommendation for tobacco cessation.    Today, Rimma reports that she has had no recurrent episodes of right sided weakness or speech difficulties.  She reports that she has a high level of concern that she is going to have another stroke again.    She is having significant difficulty with medication management.  She reports that she is not taking most of her medications  because she is not sure what they are for.  As we talked through her medications she has to walk from room to room to find them, they are stored in multiple different places.  She does not have 1 central location for medications nor a medication pillbox.  She does report that she is taking aspirin every day and believes that she is no longer on Plavix.  She has not been taking atorvastatin and cannot find a medication bottle for Zetia so she does not think she has this at all.  She believes she is taking carvedilol but is not aware of any other medications for blood pressure or heart.  She reports that she is out of her diabetes medications.  She does not have a glucose monitor so she has not been checking glucose at home.    She does report that she has been able to cut down on tobacco use.  She was previously smoking approximately 9 cigarettes/day, and has dropped this down to 4.    She has not yet completed the Zio patch, but does have it at home with her.    She is scheduled to establish care with a new primary care provider tomorrow.    Modified Imperial Scale  Score: 2-Slight disability; unable to carry out all previous activities, but able to look after own affairs    Stroke Evaluation Summarized:  New results resulted and reviewed by me today are in BOLD below.     MRI/Head CT IMPRESSION:  1. Subacute infarct in the medial right cerebellum. Tiny punctate  restricted diffusion near the left parieto-occipital encephalomalacia  likely represents acute infarct.  2. Extensive encephalomalacia present in the bilateral frontal white  matter, right anterior limb of the internal capsule, bilateral basal  ganglia, brainstem and keith, likely sequela of prior infarcts.   Intracranial Vasculature Impression:    1. Head CTA demonstrates left posterior cerebral artery proximal  stenosis with paucity of distal branches in the region of  parieto-occipital encephalomalacia, presumed chronic. No acute large  vessel occlusion. No  aneurysm of the intracranial vasculature.  2. Neck CTA demonstrates no stenosis of the major cervical arteries.  3. Right thyroid nodule. Consider nonemergent outpatient ultrasound  for additional characterization.   Cervical Vasculature above      Echocardiogram Interpretation Summary  Global and regional left ventricular function is normal with an EF of 60-65%.  Global right ventricular function is normal.  The atrial septum is intact as assessed by agitated saline bubble study .  The inferior vena cava was normal in size with preserved respiratory  variability.  This study was compared with the study from 12/20/24 .  No significant changes noted.   EKG/Telemetry Sinus rhythm with 1st degree A-V block   Minimal voltage criteria for LVH, may be normal variant ( R in aVL )   Inferior infarct , age undetermined   Anterior infarct , age undetermined   Abnormal ECG   Unconfirmed report - interpretation of this ECG is computer generated - see medical record for final interpretation     Cardiac event monitor: pending   Other Testing Not Applicable      Labs Lab Results   Component Value Date     (H) 01/31/2025    A1C 5.9 (H) 01/31/2025    CTROPT 11 01/31/2025    INR 0.96 01/31/2025    INR 1.0 01/31/2025                 Home Medications     Current Outpatient Medications   Medication Sig Dispense Refill     aspirin 81 MG EC tablet Take 1 tablet (81 mg) by mouth daily. Take one daily for stroke prevention. 90 tablet 3     atorvastatin (LIPITOR) 80 MG tablet Take 1 tablet (80 mg) by mouth daily. Take one daily for cholesterol 90 tablet 3     blood glucose (NO BRAND SPECIFIED) lancets standard Use to test blood sugar 3 times daily or as directed. 100 each 0     blood glucose (NO BRAND SPECIFIED) test strip Use to test blood sugar 3 times daily or as directed. To accompany: Blood Glucose Monitor Brands: per insurance. 300 strip 3     blood glucose (NO BRAND SPECIFIED) test strip Use to test blood sugar 3  times daily  "or as directed. 100 strip 0     blood glucose monitoring (NO BRAND SPECIFIED) meter device kit Use to test blood sugar  3 times times daily or as directed. 1 kit 0     blood glucose monitoring (SOFTCLIX) lancets USE TO TEST BLOOD SUGAR 3 TIMES DAILY OR AS DIRECTED.       Blood Glucose Monitoring Suppl (FIFTY50 GLUCOSE METER 2.0) w/Device KIT Check blood sugar  twice a day 1 kit 0     carvedilol (COREG) 25 MG tablet Take 0.5 tablets (12.5 mg) by mouth daily. 180 tablet 3     ezetimibe (ZETIA) 10 MG tablet Take 1 tablet (10 mg) by mouth daily. Take one daily for cholesterol 90 tablet 3     hydrOXYzine HCl (ATARAX) 25 MG tablet TAKE 1 TABLET(25 MG) BY MOUTH EVERY NIGHT AS NEEDED FOR ANXIETY 30 tablet 2     melatonin 3 MG tablet Take 1 tablet (3 mg) by mouth nightly as needed for sleep 90 tablet 0     nicotine (COMMIT) 2 MG lozenge Place 1 lozenge (2 mg) inside cheek every hour as needed for nicotine withdrawal symptoms. 27 lozenge 3     nicotine (NICODERM CQ) 14 MG/24HR 24 hr patch Place 1 patch over 24 hours onto the skin every 24 hours. 28 patch 1     traZODone (DESYREL) 50 MG tablet Take  mg by mouth       No current facility-administered medications for this visit.            Physical Examination     Vitals:  Vitals - Patient Reported  Weight (Patient Reported): 94.8 kg (209 lb)  Height (Patient Reported): 161.3 cm (5' 3.5\")  BMI (Based on Pt Reported Ht/Wt): 36.44         BMI Readings from Last 1 Encounters:   02/01/25 36.62 kg/m        Neurologic: Completed via telemedicine video call  Mental Status:  alert, oriented x 3, speech clear and fluent  Cranial Nerves:  EOMI with normal smooth pursuit, facial movements symmetric, hearing not formally tested but intact to conversation, no dysarthria, shoulder shrug equal bilaterally, tongue protrusion midline  Motor:  no abnormal movements, able to move all limbs antigravity spontaneously with no signs of hemiparesis observed         Screenings and Questionnaires: "     Tobacco:    Tobacco Use      Smoking status: Every Day        Packs/day: 0.00        Types: Cigarettes        Last attempt to quit: 2019        Years since quittin.6      Smokeless tobacco: Never      Tobacco comments: 24-Smokes 3-4 cigarettes per day.       Has quit multiple times      Sleep Apnea:       Depression:      3/24/2025    12:03 PM 2024     3:55 PM   PHQ-2 (  [employer])   Q1: Little interest or pleasure in doing things 1 1   Q2: Feeling down, depressed or hopeless 1 1   PHQ-2 Score 2  2   Q1: Little interest or pleasure in doing things Several days    Q2: Feeling down, depressed or hopeless Several days    PHQ-2 Score 2        Patient-reported       Stroke Recovery and Risk Factors:      3/24/2025    12:08 PM   Stroke Questionnaire   Residual effects: Any residual effects from stroke? I have some symptoms, but I'm not sure if they're residual effects of the stroke   Level of independence: Could you live alone? No   Level of independence: Walking Need some help   Level of independence: Eating Dependent   Level of independence: Stairs Dependent   Level of independence: Dressing Need some help   Level of independence: Bathing Need some help   Level of independence: Toileting Need some help   Current therapy: Physical Therapy No   Current therapy: Occupation Therapy No   Current therapy: Speech Therapy No   Current therapy: Other No   Risk Factor: Checking blood pressure at home No, I don't have a blood pressure cuff   Risk Factor: Checking blood sugar at home Yes   Risk Factor: Usual blood sugar numbers 100 to 150   Risk Factor: Second-hand smoke at home No   Who completed this questionnaire? The patient independently           Assessment and Plan   Transient R sided weakness/speech changes, possible TIA; 2025    -Continue ASA 81mg daily indefinitely for secondary stroke prevention  -Continue Lipitor; goal LDL 40-70  -Goal blood pressure is <130/80 with tighter control  associated with improved vascular outcomes  -A1C goal <7.0%  -tobacco cessation advised  -Mediterranean diet can be beneficial for overall decreased cardiovascular risk; moderate aerobic exercise with goal of 30 minutes/day x 5 days/week   -complete 14 day cardiac event monitor; plans to put this on tomorrow   -recommend taking all medications in to primary care visit tomorrow to help clarify current medication recommendations       - Return in about 2 months (around 5/24/2025) for TIA, with CURT Hussein only, 60 min.     Stroke Education provided.  She will call us with any questions.  For any acute neurologic deficits she was advised to  go directly to the hospital rather than call the clinic.      JANY Travis CNP  Neurology  03/24/2025         ____________________________________________________________________    Billing:  Please note: for coding purposes this visit should be billed only as established and not new, since this patient was seen by my same subspecialty team ([facility]) during the hospitalization that this visit is a follow up for.        Again, thank you for allowing me to participate in the care of your patient.        Sincerely,        JANY Travis CNP    Electronically signed

## 2025-03-25 ENCOUNTER — PATIENT OUTREACH (OUTPATIENT)
Dept: CARE COORDINATION | Facility: CLINIC | Age: 60
End: 2025-03-25

## 2025-03-25 ENCOUNTER — ORDERS ONLY (AUTO-RELEASED) (OUTPATIENT)
Dept: FAMILY MEDICINE | Facility: CLINIC | Age: 60
End: 2025-03-25

## 2025-03-25 ENCOUNTER — OFFICE VISIT (OUTPATIENT)
Dept: FAMILY MEDICINE | Facility: CLINIC | Age: 60
End: 2025-03-25
Payer: COMMERCIAL

## 2025-03-25 VITALS
WEIGHT: 210 LBS | DIASTOLIC BLOOD PRESSURE: 80 MMHG | OXYGEN SATURATION: 98 % | RESPIRATION RATE: 18 BRPM | SYSTOLIC BLOOD PRESSURE: 130 MMHG | HEART RATE: 61 BPM | TEMPERATURE: 97.9 F | BODY MASS INDEX: 35.85 KG/M2 | HEIGHT: 64 IN

## 2025-03-25 DIAGNOSIS — F17.200 TOBACCO USE DISORDER: ICD-10-CM

## 2025-03-25 DIAGNOSIS — G45.9 TIA (TRANSIENT ISCHEMIC ATTACK): ICD-10-CM

## 2025-03-25 DIAGNOSIS — Q07.8 ANOMALOUS OPTIC NERVE (H): ICD-10-CM

## 2025-03-25 DIAGNOSIS — I63.433 CEREBROVASCULAR ACCIDENT (CVA) DUE TO BILATERAL EMBOLISM OF POSTERIOR CEREBRAL ARTERIES (H): ICD-10-CM

## 2025-03-25 DIAGNOSIS — I47.29 VENTRICULAR TACHYCARDIA, NON-SUSTAINED (H): ICD-10-CM

## 2025-03-25 DIAGNOSIS — Z12.11 COLON CANCER SCREENING: ICD-10-CM

## 2025-03-25 DIAGNOSIS — Z91.148: ICD-10-CM

## 2025-03-25 DIAGNOSIS — F17.200 SMOKER: ICD-10-CM

## 2025-03-25 DIAGNOSIS — E11.69 TYPE 2 DIABETES MELLITUS WITH OTHER SPECIFIED COMPLICATION, WITH LONG-TERM CURRENT USE OF INSULIN (H): ICD-10-CM

## 2025-03-25 DIAGNOSIS — Z79.4 TYPE 2 DIABETES MELLITUS WITH OTHER SPECIFIED COMPLICATION, WITH LONG-TERM CURRENT USE OF INSULIN (H): ICD-10-CM

## 2025-03-25 DIAGNOSIS — I10 ESSENTIAL HYPERTENSION: ICD-10-CM

## 2025-03-25 DIAGNOSIS — R68.89 FORGETFULNESS: ICD-10-CM

## 2025-03-25 DIAGNOSIS — F33.1 MODERATE RECURRENT MAJOR DEPRESSION (H): ICD-10-CM

## 2025-03-25 PROBLEM — I50.32 CHRONIC DIASTOLIC HEART FAILURE (H): Status: RESOLVED | Noted: 2020-10-12 | Resolved: 2025-03-25

## 2025-03-25 RX ORDER — LOSARTAN POTASSIUM 25 MG/1
25 TABLET ORAL
Qty: 90 TABLET | Refills: 1 | Status: SHIPPED | OUTPATIENT
Start: 2025-03-25

## 2025-03-25 RX ORDER — LOSARTAN POTASSIUM 25 MG/1
1 TABLET ORAL
COMMUNITY
Start: 2024-12-28 | End: 2025-03-25

## 2025-03-25 RX ORDER — POLYETHYLENE GLYCOL 3350 17 G
2 POWDER IN PACKET (EA) ORAL
Qty: 27 LOZENGE | Refills: 3 | Status: SHIPPED | OUTPATIENT
Start: 2025-03-25

## 2025-03-25 ASSESSMENT — PAIN SCALES - GENERAL: PAINLEVEL_OUTOF10: NO PAIN (0)

## 2025-03-25 ASSESSMENT — COLUMBIA-SUICIDE SEVERITY RATING SCALE - C-SSRS
1. WITHIN THE PAST MONTH, HAVE YOU WISHED YOU WERE DEAD OR WISHED YOU COULD GO TO SLEEP AND NOT WAKE UP?: NO
6. HAVE YOU EVER DONE ANYTHING, STARTED TO DO ANYTHING, OR PREPARED TO DO ANYTHING TO END YOUR LIFE?: NO
2. IN THE PAST MONTH, HAVE YOU ACTUALLY HAD ANY THOUGHTS OF KILLING YOURSELF?: NO

## 2025-03-25 NOTE — PROGRESS NOTES
Contact   Chart Review     Situation: Patient chart reviewed by .    Background: needs home care, [facility] home care declined.     Assessment: Sent to [facility] They declined. Sent to [facility]. 3- call from [facility] asking for clarification on what service is needed and how often.  She will review and call back with a decision. They accepted the order.     Plan/Recommendations: informed pcp.  Marie Marie,   [facility]  [phone]

## 2025-03-25 NOTE — PROGRESS NOTES
Assessment & Plan     TIA (transient ischemic attack)  Cerebrovascular accident (CVA) due to bilateral embolism of posterior cerebral arteries (H)  Hx stroke in 2019 with recent TIA 1/31/25-   Continue ASA 81mg daily for secondary prevention  Continue lipitor, goal LDL 40-70  Blood pressure goal <130/80 - continue coreg 12.5mg daily and losartan 25mg daily  Good control of blood sugar - A1C goal <7  Tobacco cessation   She needs to complete Zio monitor (neurology ordered)  Heart healthy/mediterranean diet, and exercise guidelines were reviewed.     Moderate recurrent major depression (H)  Chronic depressive mood- declines medications/psychotherapy  Attends [employer] and her beliefs help with coping      Type 2 diabetes mellitus with other specified complication, with long-term current use of insulin (H)  Has been well controlled-   Last A1C 5.9 on 1/31/25  - exenatide ER (BYDUREON BCISE) 2 MG/0.85ML auto-injector  Dispense: 6 mL; Refill: 2    Anomalous optic nerve (H)  Per ophthalmology note: right homonymous hemianopia (noted from prior CVA since at least 12/12/13)   patient's unable to drive due to right visual field loss     Ventricular tachycardia, non-sustained (H)  Hx of V-tach   On Coreg 12.5mg daily    Body mass index (BMI) 40.0-44.9, adult (H)  Discussed healthy diet and regular exercise    Patient has difficulty administering medication  She does not recall the name of medications she takes- not sure if she is taking them correctly- she is having significant difficulty with medication management.  Today, provided with after visit summary with medication list- suggested getting a pill lalo to set medications up.  I noticed she is forgetful - asking the same questions over and over- she needs support with medication therapy monitoring, and chronic disease monitoring.  - Home Care Referral    Smoker  - nicotine (COMMIT) 2 MG lozenge  Dispense: 27 lozenge; Refill: 3    Essential hypertension  Well controlled  Patient having issues with both hearing aids. Please call him to discuss this.       [phone]    "  - coreg 12.5mg daily  - losartan (COZAAR) 25 MG tablet  Dispense: 90 tablet; Refill: 1  - Home Blood Pressure Monitor Order for DME - ONLY FOR DME    Colon cancer screening  Did colonoscopy recently but the prep- was inadequate and recommendation is to repeat colonoscopy. Now prefers to do cologuard.   - COLOGUARD([employer])    Forgetfulness  Hx stroke in 2019 with recent TIA 1/31/25-   Will initiate cogitative assessment with OT   She is already following with neurology - consider neuropsychological evaluation  - Occupational Therapy  Referral    Tobacco use disorder  Smoking cessation advise was given- she is working on quitting.  Nicotine supplements were prescribed.      The longitudinal plan of care for the diagnosis(es)/condition(s) as documented were addressed during this visit. Due to the added complexity in care, I will continue to support Rimma in the subsequent management and with ongoing continuity of care.        MED REC REQUIRED  Post Medication Reconciliation Status:  Unable to reconcile discharge medications -patient not sure which mediations she is taking. There is no discharge med list on ED-to hospital admission note.  Nicotine/Tobacco Cessation  She reports that she has been smoking cigarettes. She has never used smokeless tobacco.  Nicotine/Tobacco Cessation Plan  Pharmacotherapies : Nicotine lozenge      BMI  Estimated body mass index is 36.62 kg/m  as calculated from the following:    Height as of this encounter: 1.613 m (5' 3.5\").    Weight as of this encounter: 95.3 kg (210 lb).   Weight management plan: Discussed healthy diet and exercise guidelines    Depression Screening Follow Up        3/19/2025     9:37 AM   PHQ   PHQ-9 Total Score 17    Q9: Thoughts of better off dead/self-harm past 2 weeks More than half the days   F/U: Thoughts of suicide or self-harm No   F/U: Safety concerns Yes       Patient-reported                 3/25/2025     9:31 AM   C-SSRS (Brief " Shashank)   Within the last month, have you wished you were dead or wished you could go to sleep and not wake up? No   Within the last month, have you had any actual thoughts of killing yourself? No   Within the last month, have you ever done anything, started to do anything, or prepared to do anything to end your life? No         Follow Up Actions Taken  Crisis resource information provided in the After Visit Summary  Patient declined referral.    Discussed the following ways the patient can remain in a safe environment:  be around others        Subjective   Rimma is a 59 year old, presenting for the following health issues:  Follow Up and Recheck Medication (PT REPORTS THAT SHE'S HERE TO DISCUSS AND FOLLOW UP FROM OVERNIGHT AT THE [facility] ON 1/31/25 FOR CVA.)        3/25/2025     8:55 AM   Additional Questions   Roomed by ANGEL   Accompanied by ALONE         3/25/2025     8:55 AM   Patient Reported Additional Medications   Patient reports taking the following new medications NONE     HPI      Rimma was hospitalized at [facility] on 1/31/25. Prior to the hospital stay, she had a past medical history of  HTN, T2DM, uncontrolled LDL, prior left PCA stroke w/ residual dense right homonymous hemianopia, left basal ganglia stroke, obesity, SUSIE, tobacco use.     Recent overnight admission 1/31/25 for TIA - following with neurology  Neurology ordered a Zio patch- needs help applying it. Discussed with clinic manager about having our clinic staff apply the Zio patch-   Our clinic staff is not able to apply Zio patch- and ordering provider would need to order the application of Zio patch. Patient was notified of it. I also messaged neurology - to follow up with patient regarding her Zio patch application.    Has medications and does not know which ones to take.    Smoking: decreased smoking - now smoking 3- 4 cigarettes. Has smoked since age 15      Sometimes feels a little stab on left chest-  "happening for years.  She has CHF listed on problem list- per cardiology evaluation, no evidence of CHF based on symptomatology or examination - recent ECHO EF 60-65%    Reports swallowing bleach accidentally in 2021- she put it in her mouth thinking it was her drink- when she felt the test, she spitted it out.   Wondering if that maybe caused her health issues.                    Objective    /80 (BP Location: Left arm, Patient Position: Sitting, Cuff Size: Adult Large)   Pulse 61   Temp 97.9  F (36.6  C) (Tympanic)   Resp 18   Ht 1.613 m (5' 3.5\")   Wt 95.3 kg (210 lb)   LMP  (LMP Unknown)   SpO2 98%   Breastfeeding No   BMI 36.62 kg/m    Body mass index is 36.62 kg/m .  Physical Exam               Signed Electronically by: JANY Riggs CNP    Answers submitted by the patient for this visit:  Patient Health Questionnaire (Submitted on 3/19/2025)  If you checked off any problems, how difficult have these problems made it for you to do your work, take care of things at home, or get along with other people?: Very difficult  PHQ9 TOTAL SCORE: 17  Patient Health Questionnaire (G7) (Submitted on 3/19/2025)  PAMELA 7 TOTAL SCORE: 19    "

## 2025-03-31 ENCOUNTER — TELEPHONE (OUTPATIENT)
Dept: CARE COORDINATION | Facility: CLINIC | Age: 60
End: 2025-03-31
Payer: COMMERCIAL

## 2025-03-31 DIAGNOSIS — I63.433 CEREBROVASCULAR ACCIDENT (CVA) DUE TO BILATERAL EMBOLISM OF POSTERIOR CEREBRAL ARTERIES (H): Primary | ICD-10-CM

## 2025-03-31 NOTE — TELEPHONE ENCOUNTER
Stroke RN Care Coordination - Unable to Reach / Voicemail Note     Stroke RN Care Coordinator Outreach:  Patient Request ([email or web address] Application)       Outreach attempted x 1.      Unable to leave  as call was declined twice.    Stroke RN Care Coordinator  will send message with cardiology scheduling information  via [employer]. Stroke RN Care Coordinator will try to reach patient again in 1-2 business days if she has not read/responded to [employer] message.      Gracia WHATLEY, RN, SCRN  RN Stroke Neurology Care Coordinator  [facility] Neuroscience Service Line

## 2025-03-31 NOTE — TELEPHONE ENCOUNTER
"----- Message from Aleshia Hussein sent at 3/31/2025  7:08 AM CDT -----  I got an epic message that patient is needing assistance applying ziopatch. She took it to her PCP for help, but they said due to \"billing reasons\" they couldn't help.    Can you reach out and see how we can help? Thanks!  "

## 2025-03-31 NOTE — TELEPHONE ENCOUNTER
Spoke with cardiology and was told they would need the ziopatch application order placed in order to schedule the pt. Order placed and confirmed with cardiology. RNCC will contact pt to provide cardiology scheduling line.      Gracia WHATLEY, RN, SCRN  RN Stroke Neurology Care Coordinator  [facility] Neuroscience Service Line

## 2025-04-02 ENCOUNTER — TELEPHONE (OUTPATIENT)
Dept: FAMILY MEDICINE | Facility: CLINIC | Age: 60
End: 2025-04-02
Payer: COMMERCIAL

## 2025-04-02 NOTE — TELEPHONE ENCOUNTER
Closing this encounter as I have connected with pt via mychart and she was able to apply the device. No further action required.       Gracia Ibrahim BS, RN, SCRN  RN Stroke Neurology Care Coordinator  [facility] Neuroscience Service Line

## 2025-04-02 NOTE — TELEPHONE ENCOUNTER
April 2, 2025    Home health orders was received via fax for Aminata Trujillo DNP.  Patient label was attached to paperwork and placed in provider's inbox to be signed.    Vanessa Carter

## 2025-04-21 ENCOUNTER — TELEPHONE (OUTPATIENT)
Dept: FAMILY MEDICINE | Facility: CLINIC | Age: 60
End: 2025-04-21
Payer: COMMERCIAL

## 2025-04-21 DIAGNOSIS — E11.69 TYPE 2 DIABETES MELLITUS WITH OTHER SPECIFIED COMPLICATION, WITH LONG-TERM CURRENT USE OF INSULIN (H): Primary | ICD-10-CM

## 2025-04-21 DIAGNOSIS — Z79.4 TYPE 2 DIABETES MELLITUS WITH OTHER SPECIFIED COMPLICATION, WITH LONG-TERM CURRENT USE OF INSULIN (H): Primary | ICD-10-CM

## 2025-04-21 NOTE — TELEPHONE ENCOUNTER
Home Care is calling regarding an established patient with [facility].  Requesting orders from: Aminata Trujillo  PROVIDER AUTHORIZATION REQUIRED: RN unable to provide verbal approval for all orders.  See below for additional information.  RN will contact Home care with information after provider review.  Is this a request for a temporary pause in the home care episode?  No    Orders Requested  RN is requesting PCP to re-prescribe a glucometer setup. Patient's current glucometer is many years old and the patient is having trouble using it.   Please consider sending in a new script.  Patient's A1c appears to be well-controlled at 5.9% as of January this year.  Additionally looks like she is only taking bydrueon for diabetic control.  Thus pended glucometer set up for once daily used please adjust as necessary      Home care nurse does request to call back if a glucometer is sent in.    Contacts       Contact Date/Time Type Contact Phone/Fax    04/21/2025 01:31 PM CDT Phone (Incoming) [facility] (Home Care) [phone]          Uriel Dougherty RN

## 2025-04-22 RX ORDER — LANCETS
EACH MISCELLANEOUS
Qty: 100 EACH | Refills: 3 | Status: SHIPPED | OUTPATIENT
Start: 2025-04-22

## 2025-04-28 LAB — CV ZIO PRELIM RESULTS: NORMAL

## 2025-06-02 ENCOUNTER — TELEPHONE (OUTPATIENT)
Dept: FAMILY MEDICINE | Facility: CLINIC | Age: 60
End: 2025-06-02
Payer: COMMERCIAL

## 2025-06-02 NOTE — PROGRESS NOTES
Assessment & Plan     Type 2 diabetes mellitus with hyperglycemia, with long-term current use of insulin (H)  -Stable, with recent home fasting BG levels in the 120s/130s  - Basic metabolic panel: (4/26/21) Glucose 114; Cr 1.07; rest were WNL  - Hemoglobin A1c: (4/26/21) 6.6   - Albumin Random Urine Quantitative with Creat Ratio: (4/26/21) albumin urine 1,175.93 mg/g Cr  - Continue with Novolog and Lantus as directed  -Increase fluids intake due to elevated creatinine  -Diabetic foot exam deferred until next visit    Mixed hyperlipidemia/ Essential hypertension/History of CVA (cerebrovascular accident)/Chronic diastolic heart failure (H)  -Stable  -Continue with ASA, atorvastatin, carvedilol, hydrochlorothiazide, losartan, and clopidogrel as directed  -There were some confusion and concerns regarding these medications, so medications will be brought in and reviewed next week at f/u  -Follow with Dr. Pretty 12/15/21    SUSIE (obstructive sleep apnea)  -C/w CPAP as directed by Sleep Medicine    Recurrent major depressive episodes (H)/Anxiety  -Worsening depression and anxiety due to the loss of several family members in a short period of time  -Discussed referral for therapy or counseling, that was declined at this time  -Continue with Lexapro 10mg for mood and anxiety and hydroxyzine for sleep and anxiety  - hydrOXYzine (ATARAX) 25 MG tablet; TAKE 1 TABLET(25 MG) BY MOUTH EVERY NIGHT AS NEEDED FOR ANXIETY    Alopecia  -Hair loss is still occurring, especially with recent stressors  -Dermatology referral; Future    Blurring of visual image  -Episodic, temporary blurring of vision  -Follow with eye doctor at appointment tomorrow    Encounter for screening mammogram for breast cancer  - MA Screen Bilateral w/Yunier; Future    Tobacco use disorder  -Ready to quit smoking cigarettes  -Will discuss options for assistance in smoking cessation at next visit  -Lung cancer screening deferred until next  Do Cabrera Patient Age: 32 Prenatal Vit-Fe Fumarate-FA (PRENATAL VITAMIN PO) Take 1 tablet by mouth daily.       No current facility-administered medications for this visit.     PHARMACY to use:            Pharmacy preference(s) on file:   [facility] [address] [place] [address] - [address] [facility] [address] [address]  Phone: [phone] Fax: [phone]      CALL BACK INFO: Ok to leave response (including medical information) on answering machine      PCP: Dominga Trotter MD         INS: Payor: [employer] / Plan: [employer] RUFFIN OFM30 / Product Type: [employer]   PATIENT ADDRESS:  [address] [address]     visit    Microalbuminuria  On losartan 100mg   - consideration for adding spironolactone   - repeat lab at 3 month follow up     Return in about 1 week (around 5/3/2021) for Medication check. and counseling for smoking cessation    RADHA Guerra-S2, [facility]     I was present with the physician assistant student who participated in the service and in the documentation of the note. I have verified the history and personally performed the physical exam and medical decision making. I agree with the assessment and plan of care as documented in the note.       Melida Turner MD  [facility] - COLIN Shanks is a 55 year old who presents for the following health issues:    HPI       Diabetes Follow-up      How often are you checking your blood sugar? As needed    What concerns do you have today about your diabetes? None     Do you have any of these symptoms? (Select all that apply)  Numbness in feet    Have you had a diabetic eye exam in the last 12 months? No  - Last A1c (9/9/2020): 6.3  - BG: today was 127 before breakfast; yesterday was 130  - lantus 32u  - novolog 6/4/3  - on ASA, statin (atorvastatin 80mg), ARB (losartan 100mg)  - last eye exam unsure of when  -Able to recognize when hypoglycemic (Will get sweaty and dizzy) or hyperglycemic      Hyperlipidemia / HTN / VT- non sustained  / h/o CVA / G2DD Follow-Up      Are you regularly taking any medication or supplement to lower your cholesterol?   Yes- Atorvastatin    Are you having muscle aches or other side effects that you think could be caused by your cholesterol lowering medication?  No    Do you check your blood pressure regularly outside of the clinic? No     Are you following a low salt diet? No    Are your blood pressures ever more than 140 on the top number (systolic) OR more   than 90 on the bottom number (diastolic), for example 140/90? Doesn't check    - established with Dr. Pretty,  cardiology, on 2020 with recommendations for follow up in one to two months    -They normally call her for f/u, but have not called  - increased coreg 12.5mg bid  - discontinued metoprolol 25mg  - hydrochlorothiazide 25mg   - losartan 100mg   - atorvastatin 80mg  - plavix 75mg  - s/p visit with sleep medicine. C/w CPAP. Follow up in one month  -Some confusion with what medications she is taking    BP Readings from Last 2 Encounters:   21 134/70   20 (!) 143/88     Hemoglobin A1C (%)   Date Value   2020 6.3 (H)     LDL Cholesterol Calculated (mg/dL)   Date Value   2020 73       Depression and Anxiety Follow-Up    How are you doing with your depression since your last visit? No change    How are you doing with your anxiety since your last visit?  Worsened    Are you having other symptoms that might be associated with depression or anxiety? No    Have you had a significant life event? OTHER: family deaths     Do you have any concerns with your use of alcohol or other drugs? No    -Lexapro 10mg  -Hydroxyzine: Helpful for sleep and anxiety    -Sleep: Not good  -Appetite: Up and down  -Panic attacks: Occur frequently   -Has daughters for support system    Social History     Tobacco Use     Smoking status: Current Every Day Smoker     Packs/day: 0.25     Types: Cigarettes     Last attempt to quit: 2019     Years since quittin.6     Smokeless tobacco: Never Used   Substance Use Topics     Alcohol use: Not Currently     Drug use: Not Currently     PHQ 2020   PHQ-9 Total Score 18 19   Q9: Thoughts of better off dead/self-harm past 2 weeks Not at all Several days     PAMELA-7 SCORE 2020   Total Score 21 20       # Alopecia: no showed dermatology    -Still occurring, a lot of stress      # Wellness  - Mammogram: Updating  - Colon cancer screening (2016): adenomatous polyps and hyperplastic polyps. Recommend repeat in 2 yrs d/t poor prepMahogany Shanks is agreeable  to general sx referral   - Lung cancer screening: deferred to next visit       To do list:  - cardiology follow up  - dermatology  - general sx  - mammogram       #Eye problem  -Loss of peripheral vision in R eye  -Occasionally will sit and randomly vision will go blurry bilaterally, will squeeze eyes shut and it will return  -Started when family emergencies have started  -Will bring concern up to eye doctor at appointment tomorrow      #Smoking Cessation  -Ready to quit  -Had inhaler in [place] in the past that helped her quit for 3 weeks      Review of Systems   Constitutional: Negative for chills, fatigue and fever.   HENT: Negative for congestion and rhinorrhea.    Eyes: Positive for visual disturbance.   Respiratory: Negative for shortness of breath.    Cardiovascular: Negative for chest pain and peripheral edema.   Gastrointestinal: Negative for constipation, diarrhea, heartburn and nausea.   Genitourinary: Negative for difficulty urinating.   Neurological: Negative for dizziness and light-headedness.   Psychiatric/Behavioral: Positive for sleep disturbance. The patient is nervous/anxious.           Objective    /70   Pulse 86   Temp 98.3  F (36.8  C) (Tympanic)   Wt 104.3 kg (230 lb)   SpO2 97%   BMI 40.10 kg/m    Body mass index is 40.1 kg/m .     Physical Exam  Constitutional:       General: She is awake.   Cardiovascular:      Rate and Rhythm: Normal rate and regular rhythm.      Heart sounds: Normal heart sounds.   Pulmonary:      Effort: Pulmonary effort is normal.      Breath sounds: Normal breath sounds.   Neurological:      Mental Status: She is alert.   Psychiatric:         Mood and Affect: Mood and affect normal.        Results for orders placed or performed in visit on 04/26/21 (from the past 24 hour(s))   Basic metabolic panel   Result Value Ref Range    Sodium 138 133 - 144 mmol/L    Potassium 3.4 3.4 - 5.3 mmol/L    Chloride 106 94 - 109 mmol/L    Carbon Dioxide 27 20 - 32 mmol/L     Anion Gap 5 3 - 14 mmol/L    Glucose 114 (H) 70 - 99 mg/dL    Urea Nitrogen 12 7 - 30 mg/dL    Creatinine 1.07 (H) 0.52 - 1.04 mg/dL    GFR Estimate 58 (L) >60 mL/min/[1.73_m2]    GFR Estimate If Black 67 >60 mL/min/[1.73_m2]    Calcium 9.4 8.5 - 10.1 mg/dL   Hemoglobin A1c   Result Value Ref Range    Hemoglobin A1C 6.6 (H) 0 - 5.6 %   Estimated Average Glucose   Result Value Ref Range    Estimated Average Glucose 143 mg/dL   Albumin Random Urine Quantitative with Creat Ratio   Result Value Ref Range    Creatinine Urine 216 mg/dL    Albumin Urine mg/L 2,540 mg/L    Albumin Urine mg/g Cr 1,175.93 (H) 0 - 25 mg/g Cr

## 2025-06-02 NOTE — TELEPHONE ENCOUNTER
June 2, 2025    Home health orders was received via fax for Aminata Trujillo DNP.  Patient label was attached to paperwork and placed in provider's inbox to be signed.    Vanessa Carter     Performed Resulted

## 2025-06-04 DIAGNOSIS — Z53.9 DIAGNOSIS NOT YET DEFINED: Primary | ICD-10-CM

## 2025-06-04 PROCEDURE — G0179 MD RECERTIFICATION HHA PT: HCPCS | Mod: 4MD | Performed by: NURSE PRACTITIONER

## 2025-06-05 NOTE — TELEPHONE ENCOUNTER
June 5, 2025    Home health orders was picked up from outbox of Aminata Trujillo DNP and sent via fax to [phone].    Vanessa Carter

## 2025-06-11 ENCOUNTER — TELEPHONE (OUTPATIENT)
Dept: NEUROLOGY | Facility: CLINIC | Age: 60
End: 2025-06-11
Payer: COMMERCIAL

## 2025-06-11 NOTE — TELEPHONE ENCOUNTER
Attempted to reach patient 2x to remind them about appointment scheduled with JANY Travis CNP on 6/12/25 in our [place] clinic.    A voicemail was unable to be left with a call back number if the patient has questions or would like to reschedule.

## 2025-06-12 ENCOUNTER — VIRTUAL VISIT (OUTPATIENT)
Dept: NEUROLOGY | Facility: CLINIC | Age: 60
End: 2025-06-12
Payer: COMMERCIAL

## 2025-06-12 DIAGNOSIS — G45.9 TIA (TRANSIENT ISCHEMIC ATTACK): Primary | ICD-10-CM

## 2025-06-12 NOTE — NURSING NOTE
Current patient location: [address] [place], [place] [address]  [place] [address]    Is the patient currently in the state of [place]? YES    Visit mode:VIDEO    If the visit is dropped, the patient can be reconnected by: VIDEO VISIT: Text to cell phone:   Telephone Information:   Mobile [phone]       Will anyone else be joining the visit? NO  (If patient encounters technical issues they should call [phone]    How would you like to obtain your AVS? MyChart    Are changes needed to the allergy or medication list? No    Are refills needed on medications prescribed by this physician? NO    Reason for visit: Stroke    Arlette Lino MA

## 2025-06-12 NOTE — LETTER
6/12/2025      Rimma Sarmiento  [address]  [place] [address]      Dear Colleague,    Thank you for referring your patient, Rimma Sarmiento, to the [facility]. Please see a copy of my visit note below.    Virtual Visit Details    Type of service:  Video Visit   Video Start Time: 12:32 PM  Video End Time:12:41 PM    Originating Location (pt. Location): Home    Distant Location (provider location):  Off-site  Platform used for Video Visit: YouG[employer]    __________________________________________________________      [facility]   [phone]  __________________________________________________________           History of Present Illness   Chief Complaint: Patient presents with:  Stroke      Rimma Sarmiento is a 59 year old female presenting for follow-up for TIA and prior stroke.      She was hospitalized at [facility] on 1/31/25. 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 She presented to the hospital with fluctuating right sided weakness and speech difficulties. There was question of possible subacute infarcts vs T2 shine through in the R cerebellum and L pareto-occipital regions, although no clearly acute findings that correlated with presenting symptoms. Given numerous vascular risk factors/high risk of stroke it was decided to treat as TIA with DAPT x21 days, Lipitor with recommendation for tobacco cessation.    Most recent stroke follow-up was with myself 3/20/2025.  At that time she denied any recurrent episodes of right sided weakness or speech difficulty but endorsed high anxiety about having another TIA or stroke.  She was having difficulty with medication management; she believed she was taking aspirin but did not think she was taking  "atorvastatin or Zetia and was unsure of any antihypertensive/cardiac medications.  She had cut down cigarettes from 9/day to 4/day.  She had not yet completed Zio patch as she was unsure how to apply it.  It was recommended that she continue aspirin, restart Lipitor and schedule a primary care visit to try and clarify antihypertensive/cardiac regimen.  Tobacco cessation was advised.  14-day cardiac monitor was also again recommended.    Today, Rimma reports that she has been stable since her most recent follow-up.  She denies any residual symptoms of right sided weakness/speech changes.  She also denies any new neurological symptoms or concerns.    She reports that she now has a home health nurse who comes once per week to help with medication set up and that she has no difficulty taking her medications on a daily basis.  She confirms that she is taking aspirin and denies any abnormal bleeding or excessive bruising.  She also confirms that she is on Lipitor.  She reports that her blood pressure is \"good\" although admits she is not regularly checking it at home.  She continues to smoke 4 cigarettes/day, down from prior usage of 9/day.  She is potentially interested in continuing to reduce cigarette use, although does not feel ready to reduce right now.    Modified Wells Scale  Score: 0-No symptoms    Stroke Evaluation Summarized:  New results resulted and reviewed by me today are in BOLD below.      MRI/Head CT IMPRESSION:  1. Subacute infarct in the medial right cerebellum. Tiny punctate  restricted diffusion near the left parieto-occipital encephalomalacia  likely represents acute infarct.  2. Extensive encephalomalacia present in the bilateral frontal white  matter, right anterior limb of the internal capsule, bilateral basal  ganglia, brainstem and keith, likely sequela of prior infarcts.   Intracranial Vasculature Impression:    1. Head CTA demonstrates left posterior cerebral artery proximal  stenosis with " paucity of distal branches in the region of  parieto-occipital encephalomalacia, presumed chronic. No acute large  vessel occlusion. No aneurysm of the intracranial vasculature.  2. Neck CTA demonstrates no stenosis of the major cervical arteries.  3. Right thyroid nodule. Consider nonemergent outpatient ultrasound  for additional characterization.   Cervical Vasculature above      Echocardiogram Interpretation Summary  Global and regional left ventricular function is normal with an EF of 60-65%.  Global right ventricular function is normal.  The atrial septum is intact as assessed by agitated saline bubble study .  The inferior vena cava was normal in size with preserved respiratory  variability.  This study was compared with the study from 12/20/24 .  No significant changes noted.   EKG/Telemetry Sinus rhythm with 1st degree A-V block   Minimal voltage criteria for LVH, may be normal variant ( R in aVL )   Inferior infarct , age undetermined   Anterior infarct , age undetermined   Abnormal ECG   Unconfirmed report - interpretation of this ECG is computer generated - see medical record for final interpretation      Cardiac event monitor: No atrial fibrillation identified   Other Testing Not Applicable      Labs Lab Results   Component Value Date     (H) 01/31/2025    A1C 5.9 (H) 01/31/2025    CTROPT 11 01/31/2025    INR 0.96 01/31/2025    INR 1.0 01/31/2025                 Home Medications     Current Outpatient Medications   Medication Sig Dispense Refill     aspirin 81 MG EC tablet Take 1 tablet (81 mg) by mouth daily. Take one daily for stroke prevention. 90 tablet 3     atorvastatin (LIPITOR) 80 MG tablet Take 1 tablet (80 mg) by mouth daily. Take one daily for cholesterol 90 tablet 3     blood glucose (NO BRAND SPECIFIED) lancets standard Use to test blood sugar 3 times daily or as directed. 100 each 0     blood glucose (NO BRAND SPECIFIED) test strip Use to test blood sugar 1 times daily or as  directed. To accompany: Blood Glucose Monitor Brands: per insurance. 100 strip 3     blood glucose (NO BRAND SPECIFIED) test strip Use to test blood sugar 3 times daily or as directed. To accompany: Blood Glucose Monitor Brands: per insurance. 300 strip 3     blood glucose (NO BRAND SPECIFIED) test strip Use to test blood sugar 3  times daily or as directed. 100 strip 0     blood glucose monitoring (NO BRAND SPECIFIED) meter device kit Use to test blood sugar 1 times daily or as directed. Preferred blood glucose meter OR supplies to accompany: Blood Glucose Monitor Brands: per insurance. 1 kit 0     blood glucose monitoring (NO BRAND SPECIFIED) meter device kit Use to test blood sugar  3 times times daily or as directed. 1 kit 0     blood glucose monitoring (SOFTCLIX) lancets USE TO TEST BLOOD SUGAR 3 TIMES DAILY OR AS DIRECTED.       Blood Glucose Monitoring Suppl (FIFTY50 GLUCOSE METER 2.0) w/Device KIT Check blood sugar  twice a day 1 kit 0     carvedilol (COREG) 25 MG tablet Take 0.5 tablets (12.5 mg) by mouth daily. 180 tablet 3     exenatide ER (BYDUREON BCISE) 2 MG/0.85ML auto-injector Inject 2 mg subcutaneously every 7 days. 6 mL 2     ezetimibe (ZETIA) 10 MG tablet Take 1 tablet (10 mg) by mouth daily. Take one daily for cholesterol 90 tablet 3     hydrOXYzine HCl (ATARAX) 25 MG tablet TAKE 1 TABLET(25 MG) BY MOUTH EVERY NIGHT AS NEEDED FOR ANXIETY 30 tablet 2     losartan (COZAAR) 25 MG tablet Take 1 tablet (25 mg) by mouth daily at 2 pm. 90 tablet 1     melatonin 3 MG tablet Take 1 tablet (3 mg) by mouth nightly as needed for sleep 90 tablet 0     nicotine (COMMIT) 2 MG lozenge Place 1 lozenge (2 mg) inside cheek every hour as needed for nicotine withdrawal symptoms. 27 lozenge 3     nicotine (NICODERM CQ) 14 MG/24HR 24 hr patch Place 1 patch over 24 hours onto the skin every 24 hours. 28 patch 1     thin (NO BRAND SPECIFIED) lancets Use with lanceting device once daily. To accompany: Blood Glucose  "Monitor Brands: per insurance. 100 each 3     traZODone (DESYREL) 50 MG tablet Take  mg by mouth       No current facility-administered medications for this visit.            Physical Examination     Vitals:  Vitals - Patient Reported  Weight (Patient Reported): 93.4 kg (206 lb)  Height (Patient Reported): 161.3 cm (5' 3.5\")  BMI (Based on Pt Reported Ht/Wt): 35.92  Pain Score: No Pain (0)         BMI Readings from Last 1 Encounters:   25 36.62 kg/m        Neurologic: Completed via telemedicine video call  Mental Status:  alert, oriented x 3, speech clear and fluent  Cranial Nerves:  EOMI with normal smooth pursuit, facial movements symmetric, hearing not formally tested but intact to conversation, no dysarthria, shoulder shrug equal bilaterally, tongue protrusion midline  Motor:  no abnormal movements, able to move all limbs antigravity spontaneously with no signs of hemiparesis observed, no pronator drift  Coordination:  normal finger-to-nose and heel-to-shin bilaterally without dysmetria, rapid alternating movements symmetric         Screenings and Questionnaires:     Tobacco:    Tobacco Use      Smoking status: Every Day        Packs/day: 0.00        Types: Cigarettes        Last attempt to quit: 2019        Years since quittin.8      Smokeless tobacco: Never      Tobacco comments: 24-Smokes 3-4 cigarettes per day.       Has quit multiple times      Sleep Apnea:       Depression:      3/24/2025    12:03 PM 2024     3:55 PM   PHQ-2 (  [employer])   Q1: Little interest or pleasure in doing things 1 1   Q2: Feeling down, depressed or hopeless 1 1   PHQ-2 Score 2  2   Q1: Little interest or pleasure in doing things Several days    Q2: Feeling down, depressed or hopeless Several days    PHQ-2 Score 2        Patient-reported       Stroke Recovery and Risk Factors:      3/24/2025    12:08 PM   Stroke Questionnaire   Residual effects: Any residual effects from stroke? I have some symptoms, " but I'm not sure if they're residual effects of the stroke   Level of independence: Could you live alone? No   Level of independence: Walking Need some help   Level of independence: Eating Dependent   Level of independence: Stairs Dependent   Level of independence: Dressing Need some help   Level of independence: Bathing Need some help   Level of independence: Toileting Need some help   Current therapy: Physical Therapy No   Current therapy: Occupation Therapy No   Current therapy: Speech Therapy No   Current therapy: Other No   Risk Factor: Checking blood pressure at home No, I don't have a blood pressure cuff   Risk Factor: Checking blood sugar at home Yes   Risk Factor: Usual blood sugar numbers 100 to 150   Risk Factor: Second-hand smoke at home No   Who completed this questionnaire? The patient independently           Assessment and Plan   Transient R sided weakness/speech changes, possible TIA; January 2025    Possible L PCA infarct vs T2 shine through on MRI Jan 2025     -Continue ASA 81mg daily indefinitely for secondary stroke prevention  -Continue Lipitor; goal LDL 40-70  -Goal blood pressure is <130/80 with tighter control associated with improved vascular outcomes  -A1C goal <7.0%  -tobacco cessation advised  -Mediterranean diet can be beneficial for overall decreased cardiovascular risk; moderate aerobic exercise with goal of 30 minutes/day x 5 days/week     - Return in about 6 months (around 12/12/2025) for TIA, with CURT Hussein only.     Stroke Education provided.  She will call us with any questions.  For any acute neurologic deficits she was advised to  go directly to the hospital rather than call the clinic.      JANY Travis [facility]  Neurology  06/12/2025         ____________________________________________________________________    Billing:  Please note: for coding purposes this visit should be billed only as established and not new, since this patient was seen by my same subspecialty team  (ealth Vascular Neurology) during the hospitalization that this visit is a follow up for.  I spent a total of 28 minutes on the day of the visit.   Time spent by me today doing chart review, history and exam, documentation and further activities per the note      Again, thank you for allowing me to participate in the care of your patient.        Sincerely,        JANY Travis CNP    Electronically signed

## 2025-06-12 NOTE — PROGRESS NOTES
Virtual Visit Details    Type of service:  Video Visit   Video Start Time: 12:32 PM  Video End Time:12:41 PM    Originating Location (pt. Location): Home    Distant Location (provider location):  Off-site  Platform used for Video Visit: Iker    __________________________________________________________      [facility]   [phone]  __________________________________________________________           History of Present Illness   Chief Complaint: Patient presents with:  Stroke      Rimma Sarmiento is a 59 year old female presenting for follow-up for TIA and prior stroke.      She was hospitalized at [facility] on 1/31/25. 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 She presented to the hospital with fluctuating right sided weakness and speech difficulties. There was question of possible subacute infarcts vs T2 shine through in the R cerebellum and L pareto-occipital regions, although no clearly acute findings that correlated with presenting symptoms. Given numerous vascular risk factors/high risk of stroke it was decided to treat as TIA with DAPT x21 days, Lipitor with recommendation for tobacco cessation.    Most recent stroke follow-up was with myself 3/20/2025.  At that time she denied any recurrent episodes of right sided weakness or speech difficulty but endorsed high anxiety about having another TIA or stroke.  She was having difficulty with medication management; she believed she was taking aspirin but did not think she was taking atorvastatin or Zetia and was unsure of any antihypertensive/cardiac medications.  She had cut down cigarettes from 9/day to 4/day.  She had not yet completed Zio patch as she was unsure how to apply it.  It was recommended that she continue aspirin, restart  "Lipitor and schedule a primary care visit to try and clarify antihypertensive/cardiac regimen.  Tobacco cessation was advised.  14-day cardiac monitor was also again recommended.    Today, Rimma reports that she has been stable since her most recent follow-up.  She denies any residual symptoms of right sided weakness/speech changes.  She also denies any new neurological symptoms or concerns.    She reports that she now has a home health nurse who comes once per week to help with medication set up and that she has no difficulty taking her medications on a daily basis.  She confirms that she is taking aspirin and denies any abnormal bleeding or excessive bruising.  She also confirms that she is on Lipitor.  She reports that her blood pressure is \"good\" although admits she is not regularly checking it at home.  She continues to smoke 4 cigarettes/day, down from prior usage of 9/day.  She is potentially interested in continuing to reduce cigarette use, although does not feel ready to reduce right now.    Modified Nidhi Scale  Score: 0-No symptoms    Stroke Evaluation Summarized:  New results resulted and reviewed by me today are in BOLD below.      MRI/Head CT IMPRESSION:  1. Subacute infarct in the medial right cerebellum. Tiny punctate  restricted diffusion near the left parieto-occipital encephalomalacia  likely represents acute infarct.  2. Extensive encephalomalacia present in the bilateral frontal white  matter, right anterior limb of the internal capsule, bilateral basal  ganglia, brainstem and keith, likely sequela of prior infarcts.   Intracranial Vasculature Impression:    1. Head CTA demonstrates left posterior cerebral artery proximal  stenosis with paucity of distal branches in the region of  parieto-occipital encephalomalacia, presumed chronic. No acute large  vessel occlusion. No aneurysm of the intracranial vasculature.  2. Neck CTA demonstrates no stenosis of the major cervical arteries.  3. Right " thyroid nodule. Consider nonemergent outpatient ultrasound  for additional characterization.   Cervical Vasculature above      Echocardiogram Interpretation Summary  Global and regional left ventricular function is normal with an EF of 60-65%.  Global right ventricular function is normal.  The atrial septum is intact as assessed by agitated saline bubble study .  The inferior vena cava was normal in size with preserved respiratory  variability.  This study was compared with the study from 12/20/24 .  No significant changes noted.   EKG/Telemetry Sinus rhythm with 1st degree A-V block   Minimal voltage criteria for LVH, may be normal variant ( R in aVL )   Inferior infarct , age undetermined   Anterior infarct , age undetermined   Abnormal ECG   Unconfirmed report - interpretation of this ECG is computer generated - see medical record for final interpretation      Cardiac event monitor: No atrial fibrillation identified   Other Testing Not Applicable      Labs Lab Results   Component Value Date     (H) 01/31/2025    A1C 5.9 (H) 01/31/2025    CTROPT 11 01/31/2025    INR 0.96 01/31/2025    INR 1.0 01/31/2025                 Home Medications     Current Outpatient Medications   Medication Sig Dispense Refill    aspirin 81 MG EC tablet Take 1 tablet (81 mg) by mouth daily. Take one daily for stroke prevention. 90 tablet 3    atorvastatin (LIPITOR) 80 MG tablet Take 1 tablet (80 mg) by mouth daily. Take one daily for cholesterol 90 tablet 3    blood glucose (NO BRAND SPECIFIED) lancets standard Use to test blood sugar 3 times daily or as directed. 100 each 0    blood glucose (NO BRAND SPECIFIED) test strip Use to test blood sugar 1 times daily or as directed. To accompany: Blood Glucose Monitor Brands: per insurance. 100 strip 3    blood glucose (NO BRAND SPECIFIED) test strip Use to test blood sugar 3 times daily or as directed. To accompany: Blood Glucose Monitor Brands: per insurance. 300 strip 3    blood  glucose (NO BRAND SPECIFIED) test strip Use to test blood sugar 3  times daily or as directed. 100 strip 0    blood glucose monitoring (NO BRAND SPECIFIED) meter device kit Use to test blood sugar 1 times daily or as directed. Preferred blood glucose meter OR supplies to accompany: Blood Glucose Monitor Brands: per insurance. 1 kit 0    blood glucose monitoring (NO BRAND SPECIFIED) meter device kit Use to test blood sugar  3 times times daily or as directed. 1 kit 0    blood glucose monitoring (SOFTCLIX) lancets USE TO TEST BLOOD SUGAR 3 TIMES DAILY OR AS DIRECTED.      Blood Glucose Monitoring Suppl (FIFTY50 GLUCOSE METER 2.0) w/Device KIT Check blood sugar  twice a day 1 kit 0    carvedilol (COREG) 25 MG tablet Take 0.5 tablets (12.5 mg) by mouth daily. 180 tablet 3    exenatide ER (BYDUREON BCISE) 2 MG/0.85ML auto-injector Inject 2 mg subcutaneously every 7 days. 6 mL 2    ezetimibe (ZETIA) 10 MG tablet Take 1 tablet (10 mg) by mouth daily. Take one daily for cholesterol 90 tablet 3    hydrOXYzine HCl (ATARAX) 25 MG tablet TAKE 1 TABLET(25 MG) BY MOUTH EVERY NIGHT AS NEEDED FOR ANXIETY 30 tablet 2    losartan (COZAAR) 25 MG tablet Take 1 tablet (25 mg) by mouth daily at 2 pm. 90 tablet 1    melatonin 3 MG tablet Take 1 tablet (3 mg) by mouth nightly as needed for sleep 90 tablet 0    nicotine (COMMIT) 2 MG lozenge Place 1 lozenge (2 mg) inside cheek every hour as needed for nicotine withdrawal symptoms. 27 lozenge 3    nicotine (NICODERM CQ) 14 MG/24HR 24 hr patch Place 1 patch over 24 hours onto the skin every 24 hours. 28 patch 1    thin (NO BRAND SPECIFIED) lancets Use with lanceting device once daily. To accompany: Blood Glucose Monitor Brands: per insurance. 100 each 3    traZODone (DESYREL) 50 MG tablet Take  mg by mouth       No current facility-administered medications for this visit.            Physical Examination     Vitals:  Vitals - Patient Reported  Weight (Patient Reported): 93.4 kg (206  "lb)  Height (Patient Reported): 161.3 cm (5' 3.5\")  BMI (Based on Pt Reported Ht/Wt): 35.92  Pain Score: No Pain (0)         BMI Readings from Last 1 Encounters:   25 36.62 kg/m        Neurologic: Completed via telemedicine video call  Mental Status:  alert, oriented x 3, speech clear and fluent  Cranial Nerves:  EOMI with normal smooth pursuit, facial movements symmetric, hearing not formally tested but intact to conversation, no dysarthria, shoulder shrug equal bilaterally, tongue protrusion midline  Motor:  no abnormal movements, able to move all limbs antigravity spontaneously with no signs of hemiparesis observed, no pronator drift  Coordination:  normal finger-to-nose and heel-to-shin bilaterally without dysmetria, rapid alternating movements symmetric         Screenings and Questionnaires:     Tobacco:    Tobacco Use      Smoking status: Every Day        Packs/day: 0.00        Types: Cigarettes        Last attempt to quit: 2019        Years since quittin.8      Smokeless tobacco: Never      Tobacco comments: 24-Smokes 3-4 cigarettes per day.       Has quit multiple times      Sleep Apnea:       Depression:      3/24/2025    12:03 PM 2024     3:55 PM   PHQ-2 (  [employer])   Q1: Little interest or pleasure in doing things 1 1   Q2: Feeling down, depressed or hopeless 1 1   PHQ-2 Score 2  2   Q1: Little interest or pleasure in doing things Several days    Q2: Feeling down, depressed or hopeless Several days    PHQ-2 Score 2        Patient-reported       Stroke Recovery and Risk Factors:      3/24/2025    12:08 PM   Stroke Questionnaire   Residual effects: Any residual effects from stroke? I have some symptoms, but I'm not sure if they're residual effects of the stroke   Level of independence: Could you live alone? No   Level of independence: Walking Need some help   Level of independence: Eating Dependent   Level of independence: Stairs Dependent   Level of independence: Dressing Need " some help   Level of independence: Bathing Need some help   Level of independence: Toileting Need some help   Current therapy: Physical Therapy No   Current therapy: Occupation Therapy No   Current therapy: Speech Therapy No   Current therapy: Other No   Risk Factor: Checking blood pressure at home No, I don't have a blood pressure cuff   Risk Factor: Checking blood sugar at home Yes   Risk Factor: Usual blood sugar numbers 100 to 150   Risk Factor: Second-hand smoke at home No   Who completed this questionnaire? The patient independently           Assessment and Plan   Transient R sided weakness/speech changes, possible TIA; January 2025    Possible L PCA infarct vs T2 shine through on MRI Jan 2025     -Continue ASA 81mg daily indefinitely for secondary stroke prevention  -Continue Lipitor; goal LDL 40-70  -Goal blood pressure is <130/80 with tighter control associated with improved vascular outcomes  -A1C goal <7.0%  -tobacco cessation advised  -Mediterranean diet can be beneficial for overall decreased cardiovascular risk; moderate aerobic exercise with goal of 30 minutes/day x 5 days/week     - Return in about 6 months (around 12/12/2025) for TIA, with CURT Hussein only.     Stroke Education provided.  She will call us with any questions.  For any acute neurologic deficits she was advised to  go directly to the hospital rather than call the clinic.      JANY Travis [facility]  Neurology  06/12/2025         ____________________________________________________________________    Billing:  Please note: for coding purposes this visit should be billed only as established and not new, since this patient was seen by my same subspecialty team (ealth Vascular Neurology) during the hospitalization that this visit is a follow up for.  I spent a total of 28 minutes on the day of the visit.   Time spent by me today doing chart review, history and exam, documentation and further activities per the note

## 2025-06-25 ENCOUNTER — TELEPHONE (OUTPATIENT)
Dept: FAMILY MEDICINE | Facility: CLINIC | Age: 60
End: 2025-06-25

## 2025-06-25 NOTE — TELEPHONE ENCOUNTER
----- Message from Aminata Trujillo sent at 6/25/2025  2:39 PM CDT -----  Please send no show letter.      Thank you    Aminata Trujillo, CNP   Initial

## 2025-06-25 NOTE — LETTER
[address] [place] [address]        Rimma Sarmiento  [address]  [place] [address]                                                                                                                                                    RE:  Missed Appointment Date: June 25, 2025      Dear Rimma Sarmiento,    Your health is important to us, and our records indicate that you missed your most recent appointment with Aminata Trujillo NP at [facility].  If this is incorrect, please contact us so we can adjust our records.  We understand that due to unforeseen circumstances or a simple oversight, this can happen.     In general, if you make an appointment and are unable to keep it, please call our office at least 2 hours in advance so that we may offer this appointment time to another patient.    If you failed to keep your appointment because of concerns over your ability to pay for medical care, please contact us so that we can discuss alternate financial options.    For future reference - Our policy allows no more than three missed appointments in a period of 12 months. A fourth missed appointment may result in the end of the working relationship with your current provider at the [facility].       If you have any questions regarding this notice, please contact us at [phone].    Sincerely,    [facility]

## 2025-07-10 ENCOUNTER — TELEPHONE (OUTPATIENT)
Dept: FAMILY MEDICINE | Facility: CLINIC | Age: 60
End: 2025-07-10
Payer: COMMERCIAL

## 2025-07-10 NOTE — TELEPHONE ENCOUNTER
July 10, 2025    Home health orders was received via fax for Aminata Trujillo DNP.  Patient label was attached to paperwork and placed in provider's inbox to be signed.    Vanessa Carter

## 2025-07-11 DIAGNOSIS — Z53.9 DIAGNOSIS NOT YET DEFINED: Primary | ICD-10-CM

## 2025-07-11 PROCEDURE — G0179 MD RECERTIFICATION HHA PT: HCPCS | Performed by: NURSE PRACTITIONER

## 2025-07-21 DIAGNOSIS — Z79.4 TYPE 2 DIABETES MELLITUS WITH OTHER SPECIFIED COMPLICATION, WITH LONG-TERM CURRENT USE OF INSULIN (H): ICD-10-CM

## 2025-07-21 DIAGNOSIS — E11.69 TYPE 2 DIABETES MELLITUS WITH OTHER SPECIFIED COMPLICATION, WITH LONG-TERM CURRENT USE OF INSULIN (H): ICD-10-CM

## 2025-07-23 ENCOUNTER — OFFICE VISIT (OUTPATIENT)
Dept: FAMILY MEDICINE | Facility: CLINIC | Age: 60
End: 2025-07-23
Attending: NURSE PRACTITIONER
Payer: COMMERCIAL

## 2025-07-23 VITALS
HEIGHT: 64 IN | HEART RATE: 54 BPM | OXYGEN SATURATION: 100 % | TEMPERATURE: 98.1 F | DIASTOLIC BLOOD PRESSURE: 77 MMHG | BODY MASS INDEX: 37.39 KG/M2 | RESPIRATION RATE: 22 BRPM | WEIGHT: 219 LBS | SYSTOLIC BLOOD PRESSURE: 172 MMHG

## 2025-07-23 DIAGNOSIS — N39.41 URGE INCONTINENCE OF URINE: ICD-10-CM

## 2025-07-23 DIAGNOSIS — L65.9 ALOPECIA: ICD-10-CM

## 2025-07-23 DIAGNOSIS — Z12.11 SCREEN FOR COLON CANCER: ICD-10-CM

## 2025-07-23 DIAGNOSIS — F33.9 RECURRENT MAJOR DEPRESSIVE EPISODES: ICD-10-CM

## 2025-07-23 DIAGNOSIS — Z00.00 ROUTINE GENERAL MEDICAL EXAMINATION AT A HEALTH CARE FACILITY: Primary | ICD-10-CM

## 2025-07-23 DIAGNOSIS — Z79.4 TYPE 2 DIABETES MELLITUS WITH OTHER SPECIFIED COMPLICATION, WITH LONG-TERM CURRENT USE OF INSULIN (H): ICD-10-CM

## 2025-07-23 DIAGNOSIS — G47.00 INSOMNIA, UNSPECIFIED TYPE: ICD-10-CM

## 2025-07-23 DIAGNOSIS — Z78.0 POST-MENOPAUSAL: ICD-10-CM

## 2025-07-23 DIAGNOSIS — N18.2 CKD STAGE G2/A3, GFR 60-89 AND ALBUMIN CREATININE RATIO >300 MG/G: ICD-10-CM

## 2025-07-23 DIAGNOSIS — E11.69 TYPE 2 DIABETES MELLITUS WITH OTHER SPECIFIED COMPLICATION, WITH LONG-TERM CURRENT USE OF INSULIN (H): ICD-10-CM

## 2025-07-23 DIAGNOSIS — F17.200 TOBACCO USE DISORDER: ICD-10-CM

## 2025-07-23 DIAGNOSIS — R68.89 FORGETFULNESS: ICD-10-CM

## 2025-07-23 DIAGNOSIS — I10 ESSENTIAL HYPERTENSION: ICD-10-CM

## 2025-07-23 DIAGNOSIS — Q07.8: ICD-10-CM

## 2025-07-23 DIAGNOSIS — E78.2 MIXED HYPERLIPIDEMIA: ICD-10-CM

## 2025-07-23 LAB
ALBUMIN UR-MCNC: >=300 MG/DL
APPEARANCE UR: CLEAR
BACTERIA #/AREA URNS HPF: ABNORMAL /HPF
BILIRUB UR QL STRIP: NEGATIVE
COLOR UR AUTO: YELLOW
EST. AVERAGE GLUCOSE BLD GHB EST-MCNC: 131 MG/DL
GLUCOSE UR STRIP-MCNC: NEGATIVE MG/DL
HBA1C MFR BLD: 6.2 % (ref 0–5.6)
HGB UR QL STRIP: ABNORMAL
KETONES UR STRIP-MCNC: NEGATIVE MG/DL
LEUKOCYTE ESTERASE UR QL STRIP: NEGATIVE
NITRATE UR QL: NEGATIVE
PH UR STRIP: 6 [PH] (ref 5–8)
RBC #/AREA URNS AUTO: ABNORMAL /HPF
SP GR UR STRIP: >=1.03 (ref 1–1.03)
SQUAMOUS #/AREA URNS AUTO: ABNORMAL /LPF
UROBILINOGEN UR STRIP-ACNC: 0.2 E.U./DL
WBC #/AREA URNS AUTO: ABNORMAL /HPF

## 2025-07-23 PROCEDURE — 82570 ASSAY OF URINE CREATININE: CPT | Performed by: NURSE PRACTITIONER

## 2025-07-23 PROCEDURE — 83036 HEMOGLOBIN GLYCOSYLATED A1C: CPT | Performed by: NURSE PRACTITIONER

## 2025-07-23 PROCEDURE — 82043 UR ALBUMIN QUANTITATIVE: CPT | Performed by: NURSE PRACTITIONER

## 2025-07-23 PROCEDURE — 80053 COMPREHEN METABOLIC PANEL: CPT | Performed by: NURSE PRACTITIONER

## 2025-07-23 PROCEDURE — 99214 OFFICE O/P EST MOD 30 MIN: CPT | Mod: 25 | Performed by: NURSE PRACTITIONER

## 2025-07-23 PROCEDURE — 1126F AMNT PAIN NOTED NONE PRSNT: CPT | Performed by: NURSE PRACTITIONER

## 2025-07-23 PROCEDURE — 90471 IMMUNIZATION ADMIN: CPT | Performed by: NURSE PRACTITIONER

## 2025-07-23 PROCEDURE — G2211 COMPLEX E/M VISIT ADD ON: HCPCS | Performed by: NURSE PRACTITIONER

## 2025-07-23 PROCEDURE — 90480 ADMN SARSCOV2 VAC 1/ONLY CMP: CPT | Performed by: NURSE PRACTITIONER

## 2025-07-23 PROCEDURE — 96127 BRIEF EMOTIONAL/BEHAV ASSMT: CPT | Performed by: NURSE PRACTITIONER

## 2025-07-23 PROCEDURE — 90678 RSV VACC PREF BIVALENT IM: CPT | Performed by: NURSE PRACTITIONER

## 2025-07-23 PROCEDURE — 36415 COLL VENOUS BLD VENIPUNCTURE: CPT | Performed by: NURSE PRACTITIONER

## 2025-07-23 PROCEDURE — 91320 SARSCV2 VAC 30MCG TRS-SUC IM: CPT | Performed by: NURSE PRACTITIONER

## 2025-07-23 PROCEDURE — 3044F HG A1C LEVEL LT 7.0%: CPT | Performed by: NURSE PRACTITIONER

## 2025-07-23 PROCEDURE — 3078F DIAST BP <80 MM HG: CPT | Performed by: NURSE PRACTITIONER

## 2025-07-23 PROCEDURE — 99396 PREV VISIT EST AGE 40-64: CPT | Mod: 25 | Performed by: NURSE PRACTITIONER

## 2025-07-23 PROCEDURE — 81001 URINALYSIS AUTO W/SCOPE: CPT | Performed by: NURSE PRACTITIONER

## 2025-07-23 PROCEDURE — 3077F SYST BP >= 140 MM HG: CPT | Performed by: NURSE PRACTITIONER

## 2025-07-23 RX ORDER — GINGER ROOT/GINGER ROOT EXT 262.5 MG
1 CAPSULE ORAL 2 TIMES DAILY
Qty: 180 TABLET | Refills: 3 | Status: SHIPPED | OUTPATIENT
Start: 2025-07-23

## 2025-07-23 RX ORDER — BUPROPION HYDROCHLORIDE 150 MG/1
150 TABLET ORAL EVERY MORNING
Qty: 90 TABLET | Refills: 0 | Status: SHIPPED | OUTPATIENT
Start: 2025-07-23

## 2025-07-23 RX ORDER — LOSARTAN POTASSIUM 50 MG/1
50 TABLET ORAL DAILY
Qty: 90 TABLET | Refills: 0 | Status: SHIPPED | OUTPATIENT
Start: 2025-07-23

## 2025-07-23 RX ORDER — TRAZODONE HYDROCHLORIDE 50 MG/1
50-100 TABLET ORAL
Qty: 90 TABLET | Refills: 0 | Status: SHIPPED | OUTPATIENT
Start: 2025-07-23

## 2025-07-23 SDOH — HEALTH STABILITY: PHYSICAL HEALTH: ON AVERAGE, HOW MANY MINUTES DO YOU ENGAGE IN EXERCISE AT THIS LEVEL?: 30 MIN

## 2025-07-23 SDOH — HEALTH STABILITY: PHYSICAL HEALTH: ON AVERAGE, HOW MANY DAYS PER WEEK DO YOU ENGAGE IN MODERATE TO STRENUOUS EXERCISE (LIKE A BRISK WALK)?: 1 DAY

## 2025-07-23 ASSESSMENT — ANXIETY QUESTIONNAIRES
2. NOT BEING ABLE TO STOP OR CONTROL WORRYING: NEARLY EVERY DAY
4. TROUBLE RELAXING: NEARLY EVERY DAY
7. FEELING AFRAID AS IF SOMETHING AWFUL MIGHT HAPPEN: NEARLY EVERY DAY
7. FEELING AFRAID AS IF SOMETHING AWFUL MIGHT HAPPEN: NEARLY EVERY DAY
GAD7 TOTAL SCORE: 20
5. BEING SO RESTLESS THAT IT IS HARD TO SIT STILL: MORE THAN HALF THE DAYS
GAD7 TOTAL SCORE: 20
IF YOU CHECKED OFF ANY PROBLEMS ON THIS QUESTIONNAIRE, HOW DIFFICULT HAVE THESE PROBLEMS MADE IT FOR YOU TO DO YOUR WORK, TAKE CARE OF THINGS AT HOME, OR GET ALONG WITH OTHER PEOPLE: SOMEWHAT DIFFICULT
6. BECOMING EASILY ANNOYED OR IRRITABLE: NEARLY EVERY DAY
3. WORRYING TOO MUCH ABOUT DIFFERENT THINGS: NEARLY EVERY DAY
1. FEELING NERVOUS, ANXIOUS, OR ON EDGE: NEARLY EVERY DAY
GAD7 TOTAL SCORE: 20
8. IF YOU CHECKED OFF ANY PROBLEMS, HOW DIFFICULT HAVE THESE MADE IT FOR YOU TO DO YOUR WORK, TAKE CARE OF THINGS AT HOME, OR GET ALONG WITH OTHER PEOPLE?: SOMEWHAT DIFFICULT

## 2025-07-23 ASSESSMENT — PAIN SCALES - GENERAL: PAINLEVEL_OUTOF10: NO PAIN (0)

## 2025-07-23 ASSESSMENT — SOCIAL DETERMINANTS OF HEALTH (SDOH): HOW OFTEN DO YOU GET TOGETHER WITH FRIENDS OR RELATIVES?: NEVER

## 2025-07-23 ASSESSMENT — PATIENT HEALTH QUESTIONNAIRE - PHQ9
SUM OF ALL RESPONSES TO PHQ QUESTIONS 1-9: 12
SUM OF ALL RESPONSES TO PHQ QUESTIONS 1-9: 12
10. IF YOU CHECKED OFF ANY PROBLEMS, HOW DIFFICULT HAVE THESE PROBLEMS MADE IT FOR YOU TO DO YOUR WORK, TAKE CARE OF THINGS AT HOME, OR GET ALONG WITH OTHER PEOPLE: SOMEWHAT DIFFICULT

## 2025-07-23 NOTE — PROGRESS NOTES
Preventive Care Visit  [facility]  JANY Riggs CNP, Family Medicine  Jul 23, 2025  {Provider  Link to [facility] :410656}    {PROVIDER CHARTING PREFERENCE:052837}    Manuel Shanks is a 60 year old, presenting for the following:  Physical and Recheck Medication (Pt reports that she's here for her physical and follow up from ED visit in April for heart issues.)        7/23/2025     1:30 PM   Additional Questions   Roomed by margarita   Accompanied by alone         7/23/2025     1:30 PM   Patient Reported Additional Medications   Patient reports taking the following new medications none          HPI    Does not remember if she took her blood pressure pills.  She has a nurse that comes to her house every week. Nurse told her, the blood pressure was a little high last week.      Has been unable to hold her urine. Incontinent of urine. Also had an episode of stool incontinence.   No back injury, no numbness or tingling, or lack of sensation on thighs.     Smoking - 4 cigarettes a day.  Smoking since age 15  1/2 pack a day       {MA/LPN/RN Pre-Provider Visit Orders- hCG/UA/Strep (Optional):447769}  {SUPERLIST (Optional):468610}  {additonal problems for provider to add (Optional):819191}  Advance Care Planning  {The storyboard will display whether the patient has ACP docs on file. Hover over the Code section in the storyboard to access the ACP documents. :462309}  {(AWV REQUIRED) Advance Care Planning Reviewed:094124}        7/23/2025   General Health   How would you rate your overall physical health? (!) POOR   Feel stress (tense, anxious, or unable to sleep) Not at all         7/23/2025   Nutrition   Three or more servings of calcium each day? (!) NO   Diet: Diabetic   How many servings of fruit and vegetables per day? (!) 0-1   How many sweetened beverages each day? (!) 4+         7/23/2025   Exercise   Days per week of moderate/strenous exercise 1 day   Average minutes spent exercising  at this level 30 min   (!) EXERCISE CONCERN      2025   Social Factors   Frequency of gathering with friends or relatives Never   Worry food won't last until get money to buy more No   Food not last or not have enough money for food? No   Do you have housing? (Housing is defined as stable permanent housing and does not include staying outside in a car, in a tent, in an abandoned building, in an overnight shelter, or couch-surfing.) Yes   Are you worried about losing your housing? No   Lack of transportation? No   Unable to get utilities (heat,electricity)? No   (!) SOCIAL CONNECTIONS CONCERN      2025   Fall Risk   Fallen 2 or more times in the past year? No    Trouble with walking or balance? No        Proxy-reported          2025   Dental   Dentist two times every year? (!) NO       Today's PHQ-9 Score:       2025     1:12 PM   PHQ-9 SCORE   PHQ-9 Total Score MyChart 12 (Moderate depression)   PHQ-9 Total Score 12        Patient-reported         2025   Substance Use   If I could quit smoking, I would Somewhat agree   I want to quit somking, worry about health affects Somewhat agree   Willing to make a plan to quit smoking Somewhat agree   Willing to cut down before quitting Somewhat agree   Alcohol more than 3/day or more than 7/wk No   Do you use any other substances recreationally? No     Social History     Tobacco Use    Smoking status: Every Day     Current packs/day: 0.00     Types: Cigarettes     Last attempt to quit: 2019     Years since quittin.9    Smokeless tobacco: Never    Tobacco comments:     23-Smokes 3-4 cigarettes per day.      Has quit multiple times   Vaping Use    Vaping status: Never Used   Substance Use Topics    Alcohol use: Not Currently    Drug use: Not Currently     {Provider  If there are gaps in the social history shown above, please follow the link to update and then refresh the note Link to Social and Substance History :493742}      2024  "  LAST FHS-7 RESULTS   1st degree relative breast or ovarian cancer No   Any relative bilateral breast cancer No   Any male have breast cancer No   Any ONE woman have BOTH breast AND ovarian cancer No   Any woman with breast cancer before 50yrs No   2 or more relatives with breast AND/OR ovarian cancer No   2 or more relatives with breast AND/OR bowel cancer No     {If any of the questions to the FHS7 are answered yes, consider referral for genetic counseling.    Additional indications for genetic referral include personal history of breast or ovarian cancer, genetic mutation in 1st degree relative which increases risk of breast cancer including BRCA1, BRCA2, KAYLIN, PALB 2, TP53, CHEK2, PTEN, CDH1, STK11 (per ACS) and/or 1st degree relative with history of pancreatic or high-risk prostate cancer (per NCCN):142590}   {Mammogram Decision Support (Optional):328347}        7/23/2025   STI Screening   New sexual partner(s) since last STI/HIV test? No     History of abnormal Pap smear: { :705387}       ASCVD Risk   The ASCVD Risk score (Gabby JERRY, et al., 2019) failed to calculate for the following reasons:    Risk score cannot be calculated because patient has a medical history suggesting prior/existing ASCVD    {Link to Fracture Risk Assessment Tool (Optional):400159}    {Provider  REQUIRED FOR AWV Use the storyboard to review patient history, after sections have been marked as reviewed, refresh note to capture documentation:478237}   Reviewed and updated as needed this visit by Provider                    {HISTORY OPTIONS (Optional):796474}    {ROS Picklists (Optional):388676}     Objective    Exam  BP (!) 172/77 (BP Location: Left arm, Patient Position: Sitting, Cuff Size: Adult Large)   Pulse 54   Temp 98.1  F (36.7  C) (Tympanic)   Resp 22   Ht 1.613 m (5' 3.5\")   Wt 99.3 kg (219 lb)   LMP  (LMP Unknown)   SpO2 100%   Breastfeeding No   BMI 38.19 kg/m     Estimated body mass index is 38.19 kg/m  as " "calculated from the following:    Height as of this encounter: 1.613 m (5' 3.5\").    Weight as of this encounter: 99.3 kg (219 lb).    Physical Exam  {Exam Choices (Optional):274713}        Signed Electronically by: JANY Riggs CNP  {Email feedback regarding this note to [email or web address]   :536278}  Answers submitted by the patient for this visit:  Patient Health Questionnaire (Submitted on 7/23/2025)  If you checked off any problems, how difficult have these problems made it for you to do your work, take care of things at home, or get along with other people?: Somewhat difficult  PHQ9 TOTAL SCORE: 12  Patient Health Questionnaire (G7) (Submitted on 7/23/2025)  PAMELA 7 TOTAL SCORE: 20    " "ASCVD           Reviewed and updated as needed this visit by Provider                             Objective    Exam  BP (!) 172/77 (BP Location: Left arm, Patient Position: Sitting, Cuff Size: Adult Large)   Pulse 54   Temp 98.1  F (36.7  C) (Tympanic)   Resp 22   Ht 1.613 m (5' 3.5\")   Wt 99.3 kg (219 lb)   LMP  (LMP Unknown)   SpO2 100%   Breastfeeding No   BMI 38.19 kg/m     Estimated body mass index is 38.19 kg/m  as calculated from the following:    Height as of this encounter: 1.613 m (5' 3.5\").    Weight as of this encounter: 99.3 kg (219 lb).    Physical Exam  GENERAL: alert and no distress  EYES: Eyes grossly normal to inspection, PERRL and conjunctivae and sclerae normal  HENT: ear canals and TM's normal, nose and mouth without ulcers or lesions  NECK: no adenopathy, no asymmetry, masses, or scars  RESP: lungs clear to auscultation - no rales, rhonchi or wheezes  CV: regular rate and rhythm, normal S1 S2, no S3 or S4, no murmur, click or rub, no peripheral edema  ABDOMEN: soft, nontender, no hepatosplenomegaly, no masses and bowel sounds normal  MS: no gross musculoskeletal defects noted, no edema  SKIN: no suspicious lesions or rashes  NEURO: Normal strength and tone, mentation intact and speech normal  PSYCH: mentation appears normal, affect normal/bright        Signed Electronically by: JANY Riggs CNP    Answers submitted by the patient for this visit:  Patient Health Questionnaire (Submitted on 7/23/2025)  If you checked off any problems, how difficult have these problems made it for you to do your work, take care of things at home, or get along with other people?: Somewhat difficult  PHQ9 TOTAL SCORE: 12  Patient Health Questionnaire (G7) (Submitted on 7/23/2025)  PAMELA 7 TOTAL SCORE: 20    "

## 2025-07-23 NOTE — PATIENT INSTRUCTIONS
Patient Education   Preventive Care Advice   This is general advice given by our system to help you stay healthy. However, your care team may have specific advice just for you. Please talk to your care team about your preventive care needs.  Nutrition  Eat 5 or more servings of fruits and vegetables each day.  Try wheat bread, brown rice and whole grain pasta (instead of white bread, rice, and pasta).  Get enough calcium and vitamin D. Check the label on foods and aim for 100% of the RDA (recommended daily allowance).  Lifestyle  Exercise at least 150 minutes each week  (30 minutes a day, 5 days a week).  Do muscle strengthening activities 2 days a week. These help control your weight and prevent disease.  No smoking.  Wear sunscreen to prevent skin cancer.  Have a dental exam and cleaning every 6 months.  Yearly exams  See your health care team every year to talk about:  Any changes in your health.  Any medicines your care team has prescribed.  Preventive care, family planning, and ways to prevent chronic diseases.  Shots (vaccines)   HPV shots (up to age 26), if you've never had them before.  Hepatitis B shots (up to age 59), if you've never had them before.  COVID-19 shot: Get this shot when it's due.  Flu shot: Get a flu shot every year.  Tetanus shot: Get a tetanus shot every 10 years.  Pneumococcal, hepatitis A, and RSV shots: Ask your care team if you need these based on your risk.  Shingles shot (for age 50 and up)  General health tests  Diabetes screening:  Starting at age 35, Get screened for diabetes at least every 3 years.  If you are younger than age 35, ask your care team if you should be screened for diabetes.  Cholesterol test: At age 39, start having a cholesterol test every 5 years, or more often if advised.  Bone density scan (DEXA): At age 50, ask your care team if you should have this scan for osteoporosis (brittle bones).  Hepatitis C: Get tested at least once in your life.  STIs (sexually  transmitted infections)  Before age 24: Ask your care team if you should be screened for STIs.  After age 24: Get screened for STIs if you're at risk. You are at risk for STIs (including HIV) if:  You are sexually active with more than one person.  You don't use condoms every time.  You or a partner was diagnosed with a sexually transmitted infection.  If you are at risk for HIV, ask about PrEP medicine to prevent HIV.  Get tested for HIV at least once in your life, whether you are at risk for HIV or not.  Cancer screening tests  Cervical cancer screening: If you have a cervix, begin getting regular cervical cancer screening tests starting at age 21.  Breast cancer scan (mammogram): If you've ever had breasts, begin having regular mammograms starting at age 40. This is a scan to check for breast cancer.  Colon cancer screening: It is important to start screening for colon cancer at age 45.  Have a colonoscopy test every 10 years (or more often if you're at risk) Or, ask your provider about stool tests like a FIT test every year or Cologuard test every 3 years.  To learn more about your testing options, visit:   .  For help making a decision, visit:   https://bit.ly/ix45890.  Prostate cancer screening test: If you have a prostate, ask your care team if a prostate cancer screening test (PSA) at age 55 is right for you.  Lung cancer screening: If you are a current or former smoker ages 50 to 80, ask your care team if ongoing lung cancer screenings are right for you.  For informational purposes only. Not to replace the advice of your health care provider. Copyright   2023 [facility]. All rights reserved. Clinically reviewed by the [facility] Transitions Program. [employer] 226686 - REV 01/24.  Relationships for Good Health  Relationships are important for our health and happiness. Social isolation, loneliness and lack of support are bad for your health. Studies show that loneliness can harm health  and limit your life span as much as high blood pressure and smoking.   Take some time to reflect on your relationships. Then answer these questions:  Are there people in your life that cause you stress or drain your energy? What can you do to set limits?  ________________________________________________________________________________________________________________________________________________________________________________________________________________________________________________________________________________________________________________________________________________  Who do you enjoy spending time with? Who can you go to for support?  ________________________________________________________________________________________________________________________________________________________________________________________________________________________________________________________________________________________________________________________________________________  What can you do to improve your relationships with others?  __________________________________________________________________________________________________________________________________________________________________________________________________________________  ______________________________________________________________________________________________________________________________  What do you like most about your relationships with others?  ________________________________________________________________________________________________________________________________________________________________________________________________________________________________________________________________________________________________________________________________________________  My goal: ______________________________________________________________________  I will:  ______________________________________________________________________________________________________________________________________________________________________________________________    For informational purposes only. Not to replace the advice of your health care provider. Copyright   2018 [facility]. All rights reserved. Clinically reviewed by [facility]  Team. [employer] 822184 - Rev 06/24.  Recovering From Depression: Care Instructions  Overview    Sticking to your treatment plan is important as you recover from depression. It may take time for your symptoms to get better after you start treatment. Try not to give up if you don't feel better right away. Make sure you keep going to counseling and taking any prescribed medicine if they are part of your treatment plan.  Focus on things that can help you feel better, such as being with friends and family. Try to eat healthy foods, be active, and get enough sleep. Take things slowly as you begin to recover.  Follow-up care is a key part of your treatment and safety. Be sure to make and go to all appointments, and call your doctor if you are having problems. It's also a good idea to know your test results and keep a list of the medicines you take.  How can you care for yourself at home?  Be realistic  If you have a large task to do, break it up into smaller steps you can handle, and just do what you can.  You may want to put off important decisions until your depression has lifted. If you have plans that will have a major impact on your life, such as marriage, divorce, or a job change, try to wait a bit. Talk it over with friends and loved ones who can help you look at the overall picture first.  Reaching out to people for help is important. Do not isolate yourself. Let your family and friends help you. Find someone you can trust and confide in, and talk to that person.  Be patient, and be kind to yourself. Remember that depression is  not your fault and is not something you can overcome with willpower alone. Treatment is important for depression, just like for any other illness. Feeling better takes time, and your mood will improve little by little.  Stay active  Stay busy and get outside. Take a walk, or try some other light exercise.  Talk with your doctor about an exercise program. Exercise can help with mild depression.  Go to a movie or concert. Take part in a Christian activity or other social gathering. Go to a ball game.  Ask a friend to have dinner with you.  Take care of yourself  Eat healthy foods such as fresh fruits and vegetables, whole grains, and lean protein. If you have lost your appetite, eat small snacks rather than large meals.  Avoid using marijuana and other drugs and drinking alcohol. Do not take medicines that have not been prescribed for you. They may interfere with medicines you may be taking for depression, or they may make your depression worse.  Take your medicines exactly as they are prescribed. You may start to feel better within 1 to 3 weeks of taking antidepressant medicine. But it can take as many as 6 to 8 weeks to see more improvement. If you have questions or concerns about your medicines, or if you do not notice any improvement by 3 weeks, talk to your doctor.  Continue to take your medicine after your symptoms improve. Taking your medicine for at least 6 months after you feel better can help keep you from getting depressed again. If this isn't the first time you have been depressed, your doctor may recommend you to take medicine even longer.  If you have any side effects from your medicine, tell your doctor. Many side effects are mild and will go away on their own after you have been taking the medicine for a few weeks. Some may last longer. Talk to your doctor if side effects are bothering you too much. You might be able to try a different medicine.  Continue counseling. It may help prevent depression from  returning, especially if you've had multiple episodes of depression. Talk with your counselor if you are having a hard time attending your sessions or you think the sessions aren't working. Don't just stop going.  Get enough sleep. Talk to your doctor if you are having problems sleeping.  Avoid sleeping pills unless they are prescribed by the doctor treating your depression. Sleeping pills may make you groggy during the day, and they may interact with other medicine you are taking.  If you have any other illnesses, such as diabetes, heart disease, or high blood pressure, make sure to continue with your treatment. Tell your doctor about all of the medicines you take, including those with or without a prescription.  Where to get help 24 hours a day, 7 days a week  If you or someone you know talks about suicide, self-harm, a mental health crisis, a substance use crisis, or any other kind of emotional distress, get help right away. You can:  Call the Suicide and Crisis Lifeline at [employer].  Text HOME to 144188 to access the Crisis Text Line.  Consider saving these numbers in your phone.  Go to [employer] for more information or to chat online.  Call 791 anytime you think you may need emergency care. For example, call if:  You feel like hurting yourself or someone else.  Someone you know has depression and is about to attempt or is attempting suicide.  Where to get help 24 hours a day, 7 days a week  If you or someone you know talks about suicide, self-harm, a mental health crisis, a substance use crisis, or any other kind of emotional distress, get help right away. You can:  Call the Suicide and Crisis Lifeline at [employer].  Text HOME to 965759 to access the Crisis Text Line.  Consider saving these numbers in your phone.  Go to [employer] for more information or to chat online.  Call your doctor now or seek immediate medical care if:  You hear voices.  Someone you know has depression and:  Starts to give away  "possessions.  Uses illegal drugs or drinks alcohol heavily.  Talks or writes about death, including writing suicide notes or talking about guns, knives, or pills.  Starts to spend a lot of time alone.  Acts very aggressively or suddenly appears calm.  Watch closely for changes in your health, and be sure to contact your doctor if:  You do not get better as expected.  Where can you learn more?  Go to https://www.[employer].net/patiented  Enter N529 in the search box to learn more about \"Recovering From Depression: Care Instructions.\"  Current as of: July 31, 2024  Content Version: 14.5    6547-1826 [employer].   Care instructions adapted under license by your healthcare professional. If you have questions about a medical condition or this instruction, always ask your healthcare professional. [employer] disclaims any warranty or liability for your use of this information.       "

## 2025-07-23 NOTE — PROGRESS NOTES
Prior to immunization administration, verified patients identity using patient s name and date of birth. Please see Immunization Activity for additional information.     Screening Questionnaire for Adult Immunization    Are you sick today?   No   Do you have allergies to medications, food, a vaccine component or latex?   Yes - penicillin   Have you ever had a serious reaction after receiving a vaccination?   No   Do you have a long-term health problem with heart, lung, kidney, or metabolic disease (e.g., diabetes), asthma, a blood disorder, no spleen, complement component deficiency, a cochlear implant, or a spinal fluid leak?  Are you on long-term aspirin therapy?   Yes - DM2  And low dose daily aspirin   Do you have cancer, leukemia, HIV/AIDS, or any other immune system problem?   No   Do you have a parent, brother, or sister with an immune system problem?   No   In the past 3 months, have you taken medications that affect  your immune system, such as prednisone, other steroids, or anticancer drugs; drugs for the treatment of rheumatoid arthritis, Crohn s disease, or psoriasis; or have you had radiation treatments?   No   Have you had a seizure, or a brain or other nervous system problem?   No   During the past year, have you received a transfusion of blood or blood    products, or been given immune (gamma) globulin or antiviral drug?   No   For women: Are you pregnant or is there a chance you could become       pregnant during the next month?   No   Have you received any vaccinations in the past 4 weeks?   No     Immunization questionnaire was positive for at least one answer.  Provider aware, no contraindications.    Pt tolerated injections. Site was cleansed with alcohol prior to injections. No pain, burning, swelling or redness at the site of the injection. Patient instructed to remain in clinic for 15 minutes afterwards, and to report any adverse reactions.    Screening performed by Barbara Germain RN on  7/23/2025 at 2:37 PM.

## 2025-07-24 ENCOUNTER — PATIENT OUTREACH (OUTPATIENT)
Dept: CARE COORDINATION | Facility: CLINIC | Age: 60
End: 2025-07-24
Payer: COMMERCIAL

## 2025-07-24 ENCOUNTER — TELEPHONE (OUTPATIENT)
Dept: GASTROENTEROLOGY | Facility: CLINIC | Age: 60
End: 2025-07-24
Payer: COMMERCIAL

## 2025-07-24 LAB
ALBUMIN SERPL BCG-MCNC: 3.8 G/DL (ref 3.5–5.2)
ALP SERPL-CCNC: 90 U/L (ref 40–150)
ALT SERPL W P-5'-P-CCNC: 59 U/L (ref 0–50)
ANION GAP SERPL CALCULATED.3IONS-SCNC: 10 MMOL/L (ref 7–15)
AST SERPL W P-5'-P-CCNC: 37 U/L (ref 0–45)
BILIRUB SERPL-MCNC: 0.4 MG/DL
BUN SERPL-MCNC: 12.3 MG/DL (ref 8–23)
CALCIUM SERPL-MCNC: 9.3 MG/DL (ref 8.8–10.4)
CHLORIDE SERPL-SCNC: 106 MMOL/L (ref 98–107)
CREAT SERPL-MCNC: 1.01 MG/DL (ref 0.51–0.95)
CREAT UR-MCNC: 151 MG/DL
EGFRCR SERPLBLD CKD-EPI 2021: 63 ML/MIN/1.73M2
GLUCOSE SERPL-MCNC: 96 MG/DL (ref 70–99)
HCO3 SERPL-SCNC: 24 MMOL/L (ref 22–29)
MICROALBUMIN UR-MCNC: 2876 MG/L
MICROALBUMIN/CREAT UR: 1904.64 MG/G CR (ref 0–25)
POTASSIUM SERPL-SCNC: 4.1 MMOL/L (ref 3.4–5.3)
PROT SERPL-MCNC: 6.2 G/DL (ref 6.4–8.3)
SODIUM SERPL-SCNC: 140 MMOL/L (ref 135–145)

## 2025-07-24 RX ORDER — EXENATIDE 2 MG/.85ML
2 INJECTION, SUSPENSION, EXTENDED RELEASE SUBCUTANEOUS
Refills: 2 | OUTPATIENT
Start: 2025-07-24

## 2025-07-24 NOTE — TELEPHONE ENCOUNTER
Pre Assessment RN Review    Focused Assessments    Stroke/TIA Review    Patient has hx of TIA/Stroke in the last 6 months. Contacted PCP and ordering provider to discuss appropriateness of procedure and recommended delay of procedure for at least 6 months. Appropriateness of procedure will be reevaluated according to provider recommendation.      Scheduling Status & Recommendations    Delay screening colonoscopy until after 10/28/25.     Haily Goss RN   Endoscopy Procedure Pre Assessment RN

## 2025-07-24 NOTE — TELEPHONE ENCOUNTER
"Endoscopy Scheduling Screen    Caller: patient    Have you had any respiratory illness or flu-like symptoms in the last 10 days?  No    Patient is ACTIVE on [employer].  Inform patient that all appointment instructions will be sent via [employer].    Review patient's insurance for any non participating payor.    Ordering/Referring Provider: Aminata Trujillo   (If ordering provider performs procedure, schedule with ordering provider unless otherwise instructed. )    BMI: Estimated body mass index is 38.19 kg/m  as calculated from the following:    Height as of 7/23/25: 1.613 m (5' 3.5\").    Weight as of 7/23/25: 99.3 kg (219 lb).     Sedation Ordered  moderate sedation.   If patient BMI > 50 do not schedule in [facility].    If patient BMI > 45 do not schedule at [facility].    Are you taking methadone or Suboxone?  NO, No RN review required.    Have you been diagnosed and are being treated for severe PTSD or severe anxiety?  NO, No RN review required.    Are you taking any prescription medications for pain 3 or more times per week?   NO, No RN review required.    Do you have a history of malignant hyperthermia?  No    (Females) Are you currently pregnant?   No     Have you been diagnosed or told you have pulmonary hypertension?   No    Do you have an LVAD?  No    Have you been told you have moderate to severe sleep apnea?  Yes. Do you use a CPAP? Yes Where is the patient located?. (RN Review required for scheduling unless scheduling in Hospital.)     Have you been told you have COPD, asthma, or any other lung disease?  No    Has your doctor ordered any cardiac tests like echo, angiogram, stress test, ablation, or EKG, that you have not completed yet?  No    Do you  have a history of any heart conditions?  Yes     Have you had any hospitalizations  in the last year for heart related issues, for example a stent placement, heart attack, or cardiomyopathy?  No    Do you have any implantable devices in your body (pacemaker, ICD)?  No    Do " "you take nitroglycerine?  No    Have you ever had or are you waiting for an organ transplant?  No. Continue scheduling, no site restrictions.    Have you had a stroke or transient ischemic attack (TIA aka \"mini stroke\") in the last 2 years?   Yes. Was your stroke/TIA in the last 6 months? Yes (RN Review required for scheduling.)    Have you been diagnosed with or been told you have cirrhosis of the liver?   No.    Are you currently on dialysis?   No    Do you need assistance transferring?   No    BMI: Estimated body mass index is 38.19 kg/m  as calculated from the following:    Height as of 7/23/25: 1.613 m (5' 3.5\").    Weight as of 7/23/25: 99.3 kg (219 lb).     Is patients BMI > 40 and scheduling location [facility]?  No    Do you take an injectable or oral medication for weight loss or diabetes (excluding insulin)?  No    Do you take the medication Naltrexone?  No    Do you take blood thinners?  No       Prep   Are you currently on dialysis or do you have chronic kidney disease?  No    Do you have a diagnosis of diabetes?  Yes (Golytely Prep)    Do you have a diagnosis of cystic fibrosis (CF)?  No    On a regular basis do you go 3 -5 days between bowel movements?  No    BMI > 40?  No    Preferred Pharmacy:    [id number] - [place], [place] - [address] [place] [address]  Phone: [phone] Fax: [phone]      Final Scheduling Details     Procedure scheduled  Colonoscopy    Surgeon:  Nestor     Date of procedure:  11/25/25     Pre-OP / PAC:   Yes - Patient informed of pre-op requirement.    Location  [facility] - Per exclusion criteria.    Sedation   MAC/Deep Sedation - Patient preference. Does not want to do with moderate. I offered her PH as well sooner and closer to 10/28/25 but she declined. Also with her diabetes she said she is fine with an afternoon appointment too.       Patient Reminders:   You will receive a call from a Nurse to review instructions and health history.  " This assessment must be completed prior to your procedure.  Failure to complete the Nurse assessment may result in the procedure being cancelled.      On the day of your procedure, please designate an adult(s) who can drive you home stay with you for the next 24 hours. The medicines used in the exam will make you sleepy. You will not be able to drive.      You cannot take public transportation, ride share services, or non-medical taxi service without a responsible caregiver.  Medical transport services are allowed with the requirement that a responsible caregiver will receive you at your destination.  We require that drivers and caregivers are confirmed prior to your procedure.

## 2025-07-25 ENCOUNTER — TELEPHONE (OUTPATIENT)
Dept: FAMILY MEDICINE | Facility: CLINIC | Age: 60
End: 2025-07-25
Payer: COMMERCIAL

## 2025-07-25 DIAGNOSIS — N18.2 CKD STAGE G2/A3, GFR 60-89 AND ALBUMIN CREATININE RATIO >300 MG/G: ICD-10-CM

## 2025-07-25 DIAGNOSIS — I10 ESSENTIAL HYPERTENSION: Primary | ICD-10-CM

## 2025-07-25 DIAGNOSIS — F33.1 MODERATE RECURRENT MAJOR DEPRESSION (H): ICD-10-CM

## 2025-07-25 DIAGNOSIS — I63.433 CEREBROVASCULAR ACCIDENT (CVA) DUE TO BILATERAL EMBOLISM OF POSTERIOR CEREBRAL ARTERIES (H): ICD-10-CM

## 2025-07-25 DIAGNOSIS — E11.65 TYPE 2 DIABETES MELLITUS WITH HYPERGLYCEMIA, WITHOUT LONG-TERM CURRENT USE OF INSULIN (H): ICD-10-CM

## 2025-07-25 NOTE — TELEPHONE ENCOUNTER
----- Message from Aminata Trujillo sent at 7/25/2025 12:14 PM CDT -----  Please call  [facility] - they are following patient at home.  Please ask them to start monitoring blood pressure - and notify me if BP is high.   Blood pressure goal is <130/80. I recently increased the losartan.       Thank you    Aminata Trujillo, CNP

## 2025-07-28 ENCOUNTER — PATIENT OUTREACH (OUTPATIENT)
Dept: CARE COORDINATION | Facility: CLINIC | Age: 60
End: 2025-07-28
Payer: COMMERCIAL

## 2025-07-28 RX ORDER — SEMAGLUTIDE 0.25 MG/.5ML
INJECTION, SOLUTION SUBCUTANEOUS
Refills: 0 | OUTPATIENT
Start: 2025-07-28

## 2025-07-28 NOTE — TELEPHONE ENCOUNTER
[facility] called [phone] regarding message below.     They are asking for provider/physician order to be written and faxed to nurses at [phone] or [phone].    Written order generated and placed in provider inbox to review and sign.     Order will need to be fax as above, thank you.

## 2025-07-29 ENCOUNTER — TELEPHONE (OUTPATIENT)
Dept: FAMILY MEDICINE | Facility: CLINIC | Age: 60
End: 2025-07-29
Payer: COMMERCIAL

## 2025-07-29 NOTE — TELEPHONE ENCOUNTER
July 29, 2025    Home health orders was received via fax for Aminata Trujillo DNP.  Patient label was attached to paperwork and placed in provider's inbox to be signed.    Vanessa Carter

## 2025-07-30 ENCOUNTER — PATIENT OUTREACH (OUTPATIENT)
Dept: CARE COORDINATION | Facility: CLINIC | Age: 60
End: 2025-07-30

## 2025-07-30 ENCOUNTER — TELEPHONE (OUTPATIENT)
Dept: FAMILY MEDICINE | Facility: CLINIC | Age: 60
End: 2025-07-30

## 2025-07-30 DIAGNOSIS — G47.00 INSOMNIA, UNSPECIFIED TYPE: ICD-10-CM

## 2025-07-30 DIAGNOSIS — F17.200 TOBACCO USE DISORDER: ICD-10-CM

## 2025-07-30 DIAGNOSIS — Z79.4 TYPE 2 DIABETES MELLITUS WITH OTHER SPECIFIED COMPLICATION, WITH LONG-TERM CURRENT USE OF INSULIN (H): ICD-10-CM

## 2025-07-30 DIAGNOSIS — Z78.0 POST-MENOPAUSAL: ICD-10-CM

## 2025-07-30 DIAGNOSIS — I10 ESSENTIAL HYPERTENSION: ICD-10-CM

## 2025-07-30 DIAGNOSIS — E11.69 TYPE 2 DIABETES MELLITUS WITH OTHER SPECIFIED COMPLICATION, WITH LONG-TERM CURRENT USE OF INSULIN (H): ICD-10-CM

## 2025-07-30 DIAGNOSIS — F33.9 RECURRENT MAJOR DEPRESSIVE EPISODES: ICD-10-CM
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
## 2025-07-31 ENCOUNTER — TELEPHONE (OUTPATIENT)
Dept: FAMILY MEDICINE | Facility: CLINIC | Age: 60
End: 2025-07-31
Payer: COMMERCIAL

## 2025-07-31 DIAGNOSIS — Z53.9 DIAGNOSIS NOT YET DEFINED: Primary | ICD-10-CM

## 2025-07-31 PROCEDURE — G0179 MD RECERTIFICATION HHA PT: HCPCS | Performed by: NURSE PRACTITIONER

## 2025-07-31 RX ORDER — GINGER ROOT/GINGER ROOT EXT 262.5 MG
1 CAPSULE ORAL 2 TIMES DAILY
Qty: 180 TABLET | Refills: 3 | OUTPATIENT
Start: 2025-07-31

## 2025-07-31 RX ORDER — BUPROPION HYDROCHLORIDE 150 MG/1
150 TABLET ORAL EVERY MORNING
Qty: 90 TABLET | Refills: 0 | OUTPATIENT
Start: 2025-07-31

## 2025-07-31 RX ORDER — LOSARTAN POTASSIUM 50 MG/1
50 TABLET ORAL DAILY
Qty: 90 TABLET | Refills: 0 | OUTPATIENT
Start: 2025-07-31

## 2025-07-31 RX ORDER — TRAZODONE HYDROCHLORIDE 50 MG/1
50-100 TABLET ORAL
Qty: 90 TABLET | Refills: 0 | OUTPATIENT
Start: 2025-07-31

## 2025-07-31 NOTE — TELEPHONE ENCOUNTER
July 31, 2025    Home health orders was picked up from outbox of Aminata Trujillo DNP and sent via fax to [phone].    Vanessa Carter

## 2025-07-31 NOTE — TELEPHONE ENCOUNTER
July 31, 2025    [facility] Order BP readings was picked up from outbox of Aminata Trujillo DNP and sent via fax to [phone].    Vanessa Carter

## 2025-07-31 NOTE — TELEPHONE ENCOUNTER
Talked with patient to relay PCP's recommendation. Patient is agreeable to waiting for pharmacist appointment.

## 2025-08-02 ENCOUNTER — HOSPITAL ENCOUNTER (OUTPATIENT)
Dept: CT IMAGING | Facility: HOSPITAL | Age: 60
Discharge: HOME | End: 2025-08-02
Attending: NURSE PRACTITIONER | Admitting: NURSE PRACTITIONER
Payer: COMMERCIAL

## 2025-08-02 DIAGNOSIS — F17.200 TOBACCO USE DISORDER: ICD-10-CM

## 2025-08-02 PROCEDURE — 71271 CT THORAX LUNG CANCER SCR C-: CPT

## 2025-08-04 ENCOUNTER — OFFICE VISIT (OUTPATIENT)
Dept: PHARMACY | Facility: CLINIC | Age: 60
End: 2025-08-04
Payer: COMMERCIAL

## 2025-08-04 ENCOUNTER — TELEPHONE (OUTPATIENT)
Dept: FAMILY MEDICINE | Facility: CLINIC | Age: 60
End: 2025-08-04
Payer: COMMERCIAL

## 2025-08-04 DIAGNOSIS — G47.00 INSOMNIA, UNSPECIFIED TYPE: ICD-10-CM

## 2025-08-04 DIAGNOSIS — F41.9 ANXIETY: ICD-10-CM

## 2025-08-04 DIAGNOSIS — E78.2 MIXED HYPERLIPIDEMIA: ICD-10-CM

## 2025-08-04 DIAGNOSIS — I10 ESSENTIAL HYPERTENSION: ICD-10-CM

## 2025-08-04 DIAGNOSIS — F33.9 RECURRENT MAJOR DEPRESSIVE EPISODES: ICD-10-CM

## 2025-08-04 DIAGNOSIS — G47.09 OTHER INSOMNIA: ICD-10-CM

## 2025-08-04 DIAGNOSIS — F17.200 TOBACCO USE DISORDER: Primary | ICD-10-CM

## 2025-08-04 DIAGNOSIS — Z79.4 TYPE 2 DIABETES MELLITUS WITH OTHER SPECIFIED COMPLICATION, WITH LONG-TERM CURRENT USE OF INSULIN (H): ICD-10-CM

## 2025-08-04 DIAGNOSIS — Z78.9 TAKES DIETARY SUPPLEMENTS: ICD-10-CM

## 2025-08-04 DIAGNOSIS — E11.69 TYPE 2 DIABETES MELLITUS WITH OTHER SPECIFIED COMPLICATION, WITH LONG-TERM CURRENT USE OF INSULIN (H): ICD-10-CM

## 2025-08-04 DIAGNOSIS — E66.01 MORBID OBESITY WITH BMI OF 40.0-44.9, ADULT (H): ICD-10-CM

## 2025-08-04 PROCEDURE — 99605 MTMS BY PHARM NP 15 MIN: CPT

## 2025-08-04 PROCEDURE — 99607 MTMS BY PHARM ADDL 15 MIN: CPT

## 2025-08-04 RX ORDER — TRAZODONE HYDROCHLORIDE 50 MG/1
50-100 TABLET ORAL
Qty: 90 TABLET | Refills: 0 | Status: SHIPPED | OUTPATIENT
Start: 2025-08-04

## 2025-08-04 RX ORDER — LOSARTAN POTASSIUM 50 MG/1
50 TABLET ORAL DAILY
Qty: 90 TABLET | Refills: 0 | Status: SHIPPED | OUTPATIENT
Start: 2025-08-04

## 2025-08-04 RX ORDER — TRAZODONE HYDROCHLORIDE 50 MG/1
TABLET ORAL
Qty: 90 TABLET | Refills: 0 | OUTPATIENT
Start: 2025-08-04

## 2025-08-04 RX ORDER — HYDROXYZINE HYDROCHLORIDE 25 MG/1
TABLET, FILM COATED ORAL
Qty: 30 TABLET | Refills: 2 | Status: SHIPPED | OUTPATIENT
Start: 2025-08-04

## 2025-08-04 RX ORDER — CARVEDILOL 25 MG/1
12.5 TABLET ORAL DAILY
Qty: 180 TABLET | Refills: 3 | Status: SHIPPED | OUTPATIENT
Start: 2025-08-04

## 2025-08-04 RX ORDER — EZETIMIBE 10 MG/1
10 TABLET ORAL DAILY
Qty: 90 TABLET | Refills: 3 | Status: SHIPPED | OUTPATIENT
Start: 2025-08-04

## 2025-08-04 RX ORDER — ATORVASTATIN CALCIUM 80 MG/1
80 TABLET, FILM COATED ORAL DAILY
Qty: 90 TABLET | Refills: 4 | Status: SHIPPED | OUTPATIENT
Start: 2025-08-04

## 2025-08-04 RX ORDER — ASPIRIN 81 MG/1
81 TABLET ORAL DAILY
Qty: 90 TABLET | Refills: 3 | Status: SHIPPED | OUTPATIENT
Start: 2025-08-04

## 2025-08-11 ENCOUNTER — OFFICE VISIT (OUTPATIENT)
Dept: OPHTHALMOLOGY | Facility: CLINIC | Age: 60
End: 2025-08-11
Payer: COMMERCIAL

## 2025-08-11 ENCOUNTER — TELEPHONE (OUTPATIENT)
Dept: FAMILY MEDICINE | Facility: CLINIC | Age: 60
End: 2025-08-11

## 2025-08-11 DIAGNOSIS — H52.13 MYOPIA OF BOTH EYES WITH ASTIGMATISM AND PRESBYOPIA: ICD-10-CM

## 2025-08-11 DIAGNOSIS — Z79.4 TYPE 2 DIABETES MELLITUS WITH HYPERGLYCEMIA, WITH LONG-TERM CURRENT USE OF INSULIN (H): Primary | ICD-10-CM

## 2025-08-11 DIAGNOSIS — Z79.4 TYPE 2 DIABETES MELLITUS WITH OTHER SPECIFIED COMPLICATION, WITH LONG-TERM CURRENT USE OF INSULIN (H): ICD-10-CM

## 2025-08-11 DIAGNOSIS — H35.363 DEGENERATIVE DRUSEN OF BOTH EYES: ICD-10-CM

## 2025-08-11 DIAGNOSIS — E11.65 TYPE 2 DIABETES MELLITUS WITH HYPERGLYCEMIA, WITH LONG-TERM CURRENT USE OF INSULIN (H): Primary | ICD-10-CM

## 2025-08-11 DIAGNOSIS — H25.813 COMBINED FORMS OF AGE-RELATED CATARACT OF BOTH EYES: ICD-10-CM

## 2025-08-11 DIAGNOSIS — H53.461 RIGHT HOMONYMOUS HEMIANOPSIA: ICD-10-CM

## 2025-08-11 DIAGNOSIS — E11.69 TYPE 2 DIABETES MELLITUS WITH OTHER SPECIFIED COMPLICATION, WITH LONG-TERM CURRENT USE OF INSULIN (H): ICD-10-CM

## 2025-08-11 DIAGNOSIS — H52.203 MYOPIA OF BOTH EYES WITH ASTIGMATISM AND PRESBYOPIA: ICD-10-CM

## 2025-08-11 DIAGNOSIS — H52.4 MYOPIA OF BOTH EYES WITH ASTIGMATISM AND PRESBYOPIA: ICD-10-CM

## 2025-08-11 PROCEDURE — 92014 COMPRE OPH EXAM EST PT 1/>: CPT | Performed by: OPHTHALMOLOGY

## 2025-08-11 PROCEDURE — 92015 DETERMINE REFRACTIVE STATE: CPT | Performed by: OPHTHALMOLOGY

## 2025-08-11 PROCEDURE — 92083 EXTENDED VISUAL FIELD XM: CPT | Performed by: OPHTHALMOLOGY

## 2025-08-11 ASSESSMENT — REFRACTION_MANIFEST
OD_AXIS: 093
OD_ADD: +2.50
OD_CYLINDER: +1.75
OS_CYLINDER: +1.25
OS_SPHERE: -0.50
OS_ADD: +2.50
OS_AXIS: 095
OD_SPHERE: -1.25

## 2025-08-11 ASSESSMENT — VISUAL ACUITY
OS_CC: 20/30
OD_CC+: -2
OD_CC: 20/25
CORRECTION_TYPE: GLASSES
METHOD: SNELLEN - LINEAR

## 2025-08-11 ASSESSMENT — REFRACTION_WEARINGRX
OS_SPHERE: -0.75
OD_SPHERE: -1.75
OD_AXIS: 103
OS_AXIS: 090
SPECS_TYPE: TRIAL FRAMES
OS_CYLINDER: +1.25
OD_CYLINDER: +2.00

## 2025-08-11 ASSESSMENT — TONOMETRY
OS_IOP_MMHG: 14
OD_IOP_MMHG: 14
IOP_METHOD: ICARE

## 2025-08-11 ASSESSMENT — CONF VISUAL FIELD: COMMENTS: 24-2 HVF PERFORMED TODAY.

## 2025-08-11 ASSESSMENT — SLIT LAMP EXAM - LIDS
COMMENTS: NORMAL
COMMENTS: NORMAL

## 2025-08-11 ASSESSMENT — CUP TO DISC RATIO
OS_RATIO: 0.1
OD_RATIO: 0.1

## 2025-08-11 ASSESSMENT — EXTERNAL EXAM - RIGHT EYE: OD_EXAM: NORMAL

## 2025-08-11 ASSESSMENT — EXTERNAL EXAM - LEFT EYE: OS_EXAM: NORMAL

## 2025-08-12 ENCOUNTER — APPOINTMENT (OUTPATIENT)
Dept: OPTOMETRY | Facility: CLINIC | Age: 60
End: 2025-08-12
Payer: COMMERCIAL

## 2025-08-12 PROCEDURE — 92341 FIT SPECTACLES BIFOCAL: CPT | Performed by: OPTOMETRIST

## 2025-08-20 ENCOUNTER — THERAPY VISIT (OUTPATIENT)
Dept: OCCUPATIONAL THERAPY | Facility: CLINIC | Age: 60
End: 2025-08-20
Attending: NURSE PRACTITIONER
Payer: COMMERCIAL

## 2025-08-20 DIAGNOSIS — R68.89 FORGETFULNESS: ICD-10-CM

## 2025-08-20 PROCEDURE — 97165 OT EVAL LOW COMPLEX 30 MIN: CPT | Mod: GO | Performed by: OCCUPATIONAL THERAPY ASSISTANT

## 2025-08-25 ENCOUNTER — TELEPHONE (OUTPATIENT)
Dept: FAMILY MEDICINE | Facility: CLINIC | Age: 60
End: 2025-08-25
Payer: COMMERCIAL

## 2025-08-26 ENCOUNTER — VIRTUAL VISIT (OUTPATIENT)
Dept: UROLOGY | Facility: CLINIC | Age: 60
End: 2025-08-26
Attending: NURSE PRACTITIONER
Payer: COMMERCIAL

## 2025-08-26 ENCOUNTER — OFFICE VISIT (OUTPATIENT)
Dept: FAMILY MEDICINE | Facility: CLINIC | Age: 60
End: 2025-08-26
Payer: COMMERCIAL

## 2025-08-26 VITALS
SYSTOLIC BLOOD PRESSURE: 125 MMHG | WEIGHT: 213.7 LBS | TEMPERATURE: 98 F | BODY MASS INDEX: 36.48 KG/M2 | HEART RATE: 62 BPM | DIASTOLIC BLOOD PRESSURE: 70 MMHG | OXYGEN SATURATION: 98 % | HEIGHT: 64 IN

## 2025-08-26 DIAGNOSIS — Z12.11 SCREEN FOR COLON CANCER: ICD-10-CM

## 2025-08-26 DIAGNOSIS — F17.200 TOBACCO USE DISORDER: ICD-10-CM

## 2025-08-26 DIAGNOSIS — Z59.41 FOOD INSECURITY: ICD-10-CM

## 2025-08-26 DIAGNOSIS — E78.2 MIXED HYPERLIPIDEMIA: ICD-10-CM

## 2025-08-26 DIAGNOSIS — I10 ESSENTIAL HYPERTENSION: ICD-10-CM

## 2025-08-26 DIAGNOSIS — E11.65 TYPE 2 DIABETES MELLITUS WITH HYPERGLYCEMIA, WITHOUT LONG-TERM CURRENT USE OF INSULIN (H): Primary | ICD-10-CM

## 2025-08-26 DIAGNOSIS — N39.41 URGE INCONTINENCE OF URINE: ICD-10-CM

## 2025-08-26 PROCEDURE — 99417 PROLNG OP E/M EACH 15 MIN: CPT | Performed by: NURSE PRACTITIONER

## 2025-08-26 PROCEDURE — 3078F DIAST BP <80 MM HG: CPT | Performed by: NURSE PRACTITIONER

## 2025-08-26 PROCEDURE — 3074F SYST BP LT 130 MM HG: CPT | Performed by: NURSE PRACTITIONER

## 2025-08-26 PROCEDURE — 99215 OFFICE O/P EST HI 40 MIN: CPT | Performed by: NURSE PRACTITIONER

## 2025-08-26 RX ORDER — NICOTINE 21 MG/24HR
1 PATCH, TRANSDERMAL 24 HOURS TRANSDERMAL EVERY 24 HOURS
Qty: 28 PATCH | Refills: 2 | Status: SHIPPED | OUTPATIENT
Start: 2025-08-26

## 2025-08-26 SDOH — ECONOMIC STABILITY - FOOD INSECURITY: FOOD INSECURITY: Z59.41

## 2025-08-26 ASSESSMENT — ANXIETY QUESTIONNAIRES
GAD7 TOTAL SCORE: 20
GAD7 TOTAL SCORE: 20
1. FEELING NERVOUS, ANXIOUS, OR ON EDGE: MORE THAN HALF THE DAYS
2. NOT BEING ABLE TO STOP OR CONTROL WORRYING: NEARLY EVERY DAY
5. BEING SO RESTLESS THAT IT IS HARD TO SIT STILL: NEARLY EVERY DAY
GAD7 TOTAL SCORE: 20
3. WORRYING TOO MUCH ABOUT DIFFERENT THINGS: NEARLY EVERY DAY
7. FEELING AFRAID AS IF SOMETHING AWFUL MIGHT HAPPEN: NEARLY EVERY DAY
4. TROUBLE RELAXING: NEARLY EVERY DAY
7. FEELING AFRAID AS IF SOMETHING AWFUL MIGHT HAPPEN: NEARLY EVERY DAY
6. BECOMING EASILY ANNOYED OR IRRITABLE: NEARLY EVERY DAY

## 2025-08-26 ASSESSMENT — PATIENT HEALTH QUESTIONNAIRE - PHQ9: SUM OF ALL RESPONSES TO PHQ QUESTIONS 1-9: 16

## 2025-08-26 ASSESSMENT — PAIN SCALES - GENERAL: PAINLEVEL_OUTOF10: NO PAIN (0)

## 2025-08-28 ENCOUNTER — PATIENT OUTREACH (OUTPATIENT)
Dept: CARE COORDINATION | Facility: CLINIC | Age: 60
End: 2025-08-28
Payer: COMMERCIAL

## 2025-08-28 ENCOUNTER — NURSE TRIAGE (OUTPATIENT)
Dept: FAMILY MEDICINE | Facility: CLINIC | Age: 60
End: 2025-08-28
Payer: COMMERCIAL

## 2025-08-28 ENCOUNTER — MEDICAL CORRESPONDENCE (OUTPATIENT)
Dept: HEALTH INFORMATION MANAGEMENT | Facility: CLINIC | Age: 60
End: 2025-08-28
Payer: COMMERCIAL

## 2025-09-01 ENCOUNTER — PATIENT OUTREACH (OUTPATIENT)
Dept: CARE COORDINATION | Facility: CLINIC | Age: 60
End: 2025-09-01
Payer: COMMERCIAL

## 2025-09-02 ENCOUNTER — PATIENT OUTREACH (OUTPATIENT)
Dept: CARE COORDINATION | Facility: CLINIC | Age: 60
End: 2025-09-02
Payer: COMMERCIAL

## 2025-09-03 ENCOUNTER — PATIENT OUTREACH (OUTPATIENT)
Dept: CARE COORDINATION | Facility: CLINIC | Age: 60
End: 2025-09-03
Payer: COMMERCIAL

## (undated) DEVICE — PAD CHUX UNDERPAD 23X24" 7136

## (undated) DEVICE — PREP CHLORAPREP 26ML TINTED ORANGE  260815

## (undated) DEVICE — KIT ENDO FIRST STEP DISINFECTANT 200ML W/POUCH EP-4

## (undated) RX ORDER — FENTANYL CITRATE 50 UG/ML
INJECTION, SOLUTION INTRAMUSCULAR; INTRAVENOUS
Status: DISPENSED
Start: 2024-10-14

## (undated) RX ORDER — ONDANSETRON 2 MG/ML
INJECTION INTRAMUSCULAR; INTRAVENOUS
Status: DISPENSED
Start: 2024-10-14